# Patient Record
Sex: MALE | Race: WHITE | NOT HISPANIC OR LATINO | Employment: OTHER | URBAN - METROPOLITAN AREA
[De-identification: names, ages, dates, MRNs, and addresses within clinical notes are randomized per-mention and may not be internally consistent; named-entity substitution may affect disease eponyms.]

---

## 2017-02-15 ENCOUNTER — TELEPHONE (OUTPATIENT)
Dept: ENDOCRINOLOGY | Age: 77
End: 2017-02-15

## 2017-02-15 RX ORDER — LANCETS
EACH MISCELLANEOUS
Qty: 300 EACH | Refills: 1 | Status: SHIPPED | OUTPATIENT
Start: 2017-02-15 | End: 2017-08-07 | Stop reason: SDUPTHER

## 2017-02-15 NOTE — TELEPHONE ENCOUNTER
----- Message from Stella Oh sent at 2/15/2017 10:18 AM EST -----  Contact: patient  ACCU-CHEK SOFTCLIX LANCETS lancets 200 each         USE TO CHECK BLOOD SUGAR THREE TIMES DAILY    90 day supply         [facility]   [phone]

## 2017-02-22 DIAGNOSIS — R06.02 SOB (SHORTNESS OF BREATH): Primary | ICD-10-CM

## 2017-02-22 DIAGNOSIS — I25.10 CORONARY ARTERY DISEASE INVOLVING NATIVE CORONARY ARTERY OF NATIVE HEART WITHOUT ANGINA PECTORIS: ICD-10-CM

## 2017-02-22 DIAGNOSIS — I10 ESSENTIAL HYPERTENSION: ICD-10-CM

## 2017-03-05 ENCOUNTER — APPOINTMENT (OUTPATIENT)
Dept: CT IMAGING | Facility: HOSPITAL | Age: 77
End: 2017-03-05

## 2017-03-05 LAB — GLUCOSE BLDC GLUCOMTR-MCNC: 181 MG/DL (ref 70–130)

## 2017-03-05 PROCEDURE — 70450 CT HEAD/BRAIN W/O DYE: CPT

## 2017-03-05 PROCEDURE — 99283 EMERGENCY DEPT VISIT LOW MDM: CPT

## 2017-03-05 PROCEDURE — 82962 GLUCOSE BLOOD TEST: CPT

## 2017-03-05 PROCEDURE — 72125 CT NECK SPINE W/O DYE: CPT

## 2017-03-05 RX ORDER — ASPIRIN 81 MG/1
81 TABLET ORAL DAILY
COMMUNITY
End: 2018-01-31

## 2017-03-06 ENCOUNTER — HOSPITAL ENCOUNTER (EMERGENCY)
Facility: HOSPITAL | Age: 77
Discharge: HOME | End: 2017-03-06
Attending: EMERGENCY MEDICINE | Admitting: EMERGENCY MEDICINE

## 2017-03-06 VITALS
WEIGHT: 140 LBS | HEIGHT: 67 IN | RESPIRATION RATE: 12 BRPM | OXYGEN SATURATION: 98 % | SYSTOLIC BLOOD PRESSURE: 154 MMHG | DIASTOLIC BLOOD PRESSURE: 79 MMHG | BODY MASS INDEX: 21.97 KG/M2 | TEMPERATURE: 97.8 F | HEART RATE: 66 BPM
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
## 2017-03-06 DIAGNOSIS — S09.90XA CLOSED HEAD INJURY, INITIAL ENCOUNTER: Primary | ICD-10-CM

## 2017-03-06 DIAGNOSIS — E10.649 HYPOGLYCEMIC UNAWARENESS ASSOCIATED WITH TYPE 1 DIABETES MELLITUS: ICD-10-CM

## 2017-03-06 DIAGNOSIS — S16.1XXA CERVICAL STRAIN, INITIAL ENCOUNTER: ICD-10-CM

## 2017-03-06 LAB — GLUCOSE BLDC GLUCOMTR-MCNC: 228 MG/DL (ref 70–130)

## 2017-03-06 PROCEDURE — 82962 GLUCOSE BLOOD TEST: CPT

## 2017-03-06 NOTE — ED PROVIDER NOTES
76 y.o. male presents after a fall due to hypoglycemia. Pt was unable to remember the fall and was confused.     On exam:  Small hematoma to the right occipital scalp      I supervised care provided by the midlevel provider.  We have discussed this patient's history, physical exam, and treatment plan.  I have reviewed the note and personally saw and examined the patient and agree with the plan of care.    --  Documentation assistance provided by kellen Welsh.  Information recorded by the scribe was done at my direction and has been verified and validated by me.     Lynn Welsh  03/06/17 0127       Luis Denny MD  03/06/17 6345

## 2017-03-06 NOTE — DISCHARGE INSTRUCTIONS
Reduce the NPH from 10u to 8 u in the morning and evening.  Continue with your sliding scale with your fast acting insulin.  Follow up and see your doctor in next 48 hours for a recheck.  Take tylenol as needed for pain and headache.  Review the head injury instructions provided and return to the ER with any concerns.

## 2017-03-06 NOTE — ED NOTES
Pt states he does not remember fall, pt states he his neck is sore when he moves it. Denies headache     Elías Mcarthur RN  03/05/17 0537

## 2017-03-06 NOTE — ED NOTES
"\"sugar problem\" Pt wife reports he fell off the stairs in the garage and hit his head. Pt wife reports that he has been having problems with low sugar. Pt wife reports that the she gave him a fig and then checked his sugar and it was 68. Pt wife reports that pt keeps repeating same questions. Pt does not recall fall.      Ivonne Huang RN  03/05/17 2146    "

## 2017-03-06 NOTE — ED PROVIDER NOTES
EMERGENCY DEPARTMENT ENCOUNTER    CHIEF COMPLAINT  Chief Complaint: Fall  History given by: Patient, Family  History limited by:   Room Number: 06/06  PMD: Jeronimo Quevedo MD      HPI:  Pt is a 76 y.o. male who presents complaining of fall due to hypoglycemia today. Family reports that pt collapsed today and when his glucose was checked he was found to have a glucose of 65. Pt was unable to recall events of fall and was confused. Family gave pt several sugary foods and he became more alert and orientated. Pt struck his head during fall and reports that he is also having some neck pain. He recalls that he had been driving around earlier today and had eaten prior to fall. He has not had any weakness, numbness, CP, SOA, dizziness. Pt is on Plavix    Duration: PTA  Onset: sudden  Timing: constant  Location: head, neck  Radiation: none  Quality: sore  Intensity/Severity: moderate  Progression: unchanged  Associated Symptoms: confusion.  Aggravating Factors: none  Alleviating Factors: none  Previous Episodes: none  Treatment before arrival: given date by family which improved pt    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 11/14/2016   • Hypoglycemic unawareness associated with type 1 diabetes mellitus 11/14/2016     Resolved Ambulatory Problems     Diagnosis Date Noted   • No Resolved Ambulatory Problems     Past Medical History   Diagnosis Date   • BPH (benign prostatic hypertrophy)    • Hyperparathyroidism    • Hypothyroidism    • Type 2 diabetes mellitus        PAST SURGICAL  HISTORY  Past Surgical History   Procedure Laterality Date   • Cataract extraction Left    • Colonoscopy     • Hemorrhoidectomy     • Retinal laser procedure Right    • Turp / transurethral incision / drainage prostate     • Coronary angioplasty with stent placement     • Parathyroidectomy     • Penile prosthesis implant  2016     Dr. [facility] at [facility]       FAMILY HISTORY  Family History   Problem Relation Age of Onset   • Diabetes Mother    • Thyroid disease Mother    • Diabetes Father    • Kidney failure Father    • Heart disease Sister        SOCIAL HISTORY  Social History     Social History   • Marital status:      Spouse name: N/A   • Number of children: N/A   • Years of education: N/A     Occupational History   • Not on file.     Social History Main Topics   • Smoking status: Former Smoker     Types: Pipe     Quit date: 1986   • Smokeless tobacco: Not on file      Comment: SMOKED PIPE    • Alcohol use Yes      Comment: SOCIALLY    • Drug use: No   • Sexual activity: Not on file     Other Topics Concern   • Not on file     Social History Narrative       ALLERGIES  Review of patient's allergies indicates no known allergies.    REVIEW OF SYSTEMS  Review of Systems   Constitutional: Negative for activity change, appetite change and fever.   HENT: Negative for congestion and sore throat.    Eyes: Negative.    Respiratory: Negative for cough and shortness of breath.    Cardiovascular: Negative for chest pain and leg swelling.   Gastrointestinal: Negative for abdominal pain, diarrhea and vomiting.   Endocrine: Negative.    Genitourinary: Negative for decreased urine volume and dysuria.   Musculoskeletal: Positive for neck pain.   Skin: Negative for rash and wound.   Allergic/Immunologic: Negative.    Neurological: Negative for weakness, numbness and headaches.   Hematological: Negative.    Psychiatric/Behavioral: Positive for confusion.   All other systems reviewed and are negative.      PHYSICAL  EXAM  ED Triage Vitals   Temp Heart Rate Resp BP SpO2   03/05/17 2155 03/05/17 2155 03/05/17 2155 03/05/17 2155 03/05/17 2155   97.8 °F (36.6 °C) 74 18 132/59 99 %      Temp src Heart Rate Source Patient Position BP Location FiO2 (%)   03/05/17 2155 03/06/17 0024 -- -- --   Tympanic Monitor          Physical Exam   Constitutional: He is oriented to person, place, and time and well-developed, well-nourished, and in no distress.   HENT:   Head: Normocephalic and atraumatic.   Eyes: EOM are normal. Pupils are equal, round, and reactive to light.   Neck: Normal range of motion. Neck supple.   Cardiovascular: Normal rate, regular rhythm and normal heart sounds.    Pulmonary/Chest: Effort normal and breath sounds normal. No respiratory distress.   Abdominal: Soft. There is no tenderness. There is no rebound and no guarding.   Musculoskeletal: Normal range of motion. He exhibits no edema.   Neurological: He is alert and oriented to person, place, and time. He has normal sensation and normal strength.   Skin: Skin is warm and dry.   Psychiatric: Mood and affect normal.   Nursing note and vitals reviewed.      LAB RESULTS  Lab Results (last 24 hours)     Procedure Component Value Units Date/Time    POC Glucose Fingerstick [[id number]]  (Abnormal) Collected:  03/05/17 2154    Specimen:  Blood Updated:  03/05/17 2155     Glucose 181 (H) mg/dL     Narrative:       Meter: [id number] : [id number] Blue Mckinley    POC Glucose Fingerstick [[id number]]  (Abnormal) Collected:  03/06/17 0056    Specimen:  Blood Updated:  03/06/17 0058     Glucose 228 (H) mg/dL     Narrative:       Meter: [id number] : [id number] Erasmo Amaya I ordered the above labs and reviewed the results    RADIOLOGY  CT Head Without Contrast   Final Result   CRANIAL CT WITHOUT CONTRAST     HISTORY: Fell with trauma to back of head. Headache.     The CT scan was performed as an emergency procedure through the brain  without contrast. There is  prominent diffuse atrophy. The prominent  ventricular enlargement may simply relate to the diffuse atrophy, but  also raises the concern of a component of hydrocephalus and further  clinical correlation is recommended. This includes enlargement of the  4th ventricle. There is no evidence of acute intracranial hemorrhage or  mass effect.     There is a tiny amount of mucosal thickening in the sphenoid sinus and  scattered in the ethmoid air cells as well as a tiny amount of fluid in  the left maxillary sinus.      CT Cervical Spine Without Contrast   Final Result   CT SCAN OF THE CERVICAL SPINE     HISTORY: Fell. Neck pain.     The CT scan was performed as an emergency procedure and demonstrates the  followin. There is no evidence of acute fracture with particular reference to  the odontoid.  2. There are scattered degenerative changes throughout the cervical  spine, but there is no evidence of central or foraminal encroachment.     This report was finalized on 3/5/2017 11:44 PM by Dr. Ahmet Valderrama MD.           I ordered the above noted radiological studies. Interpreted by radiologist. Reviewed by me in PACS.       PROCEDURES  Procedures      PROGRESS AND CONSULTS  ED Course   12:52 AM  Informed pt and family of labs and imaging showing nothing remarkable. Discussed with pt about possibility of decreasing his long acting insulin and plan to discharge. Pt understands and agrees with plan. All concerns addressed.  12:54 AM  Dicussed pt with Dr. Denny. Dr. Denny has seen and evaluated pt and agrees with tx plan.         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    Reviewed implications of results, diagnosis, meds, responsibility to follow up, warning signs and symptoms of possible worsening, potential complications and reasons to return to ER.    Patient/Family voiced understanding of above instructions.    Discussed plan for discharge, as there is no emergent indication for admission.  Pt/family is agreeable and understands need for follow up and repeat testing.  Pt is aware that discharge does not mean that nothing is wrong but it indicates no emergency is present that requires admission and they must continue care with follow-up as given below or physician of their choice.     FOLLOW-UP  Jeronimo Quevedo MD  [address] [place] [id number] [phone]    Call in 2 days  If symptoms worsen, For Primary Physician follow-up         Medication List      Changed          insulin  UNIT/ML injection   Commonly known as:  humuLIN N,novoLIN N   10 units every morning and 10 units at bedtime   What changed:  additional instructions               Latest Documented Vital Signs:  As of 4:20 AM  BP- 154/79 HR- 66 Temp- 97.8 °F (36.6 °C) (Tympanic) O2 sat- 98%    --  Documentation assistance provided by kellen Gay for Chuy Álvarez PA-C.  Information recorded by the scribe was done at my direction and has been verified and validated by me.       Joey Gay  03/06/17 0056       TRINITY Saul III  03/06/17 7640

## 2017-03-13 RX ORDER — LEVOTHYROXINE SODIUM 0.12 MG/1
TABLET ORAL
Qty: 90 TABLET | Refills: 1 | Status: SHIPPED | OUTPATIENT
Start: 2017-03-13 | End: 2017-05-17 | Stop reason: SDUPTHER

## 2017-03-14 DIAGNOSIS — R80.9 TYPE 1 DIABETES MELLITUS WITH PERSISTENT MICROALBUMINURIA (HCC): ICD-10-CM

## 2017-03-14 DIAGNOSIS — E10.29 TYPE 1 DIABETES MELLITUS WITH PERSISTENT MICROALBUMINURIA (HCC): ICD-10-CM

## 2017-03-14 DIAGNOSIS — N52.9 ERECTILE DYSFUNCTION, UNSPECIFIED ERECTILE DYSFUNCTION TYPE: ICD-10-CM

## 2017-03-14 DIAGNOSIS — I25.10 CORONARY ARTERY DISEASE INVOLVING NATIVE CORONARY ARTERY OF NATIVE HEART WITHOUT ANGINA PECTORIS: ICD-10-CM

## 2017-03-14 DIAGNOSIS — E03.9 HYPOTHYROIDISM (ACQUIRED): ICD-10-CM

## 2017-03-14 RX ORDER — ATORVASTATIN CALCIUM 20 MG/1
20 TABLET, FILM COATED ORAL DAILY
Qty: 90 TABLET | Refills: 1 | Status: SHIPPED | OUTPATIENT
Start: 2017-03-14 | End: 2021-04-07 | Stop reason: SDUPTHER

## 2017-03-14 RX ORDER — ISOSORBIDE MONONITRATE 30 MG/1
30 TABLET, EXTENDED RELEASE ORAL DAILY
Qty: 90 TABLET | Refills: 1 | Status: SHIPPED | OUTPATIENT
Start: 2017-03-14 | End: 2017-03-17 | Stop reason: SDUPTHER

## 2017-03-17 RX ORDER — ISOSORBIDE MONONITRATE 30 MG/1
30 TABLET, EXTENDED RELEASE ORAL DAILY
Qty: 90 TABLET | Refills: 1 | Status: SHIPPED | OUTPATIENT
Start: 2017-03-17 | End: 2017-03-28 | Stop reason: SDUPTHER

## 2017-03-17 RX ORDER — CLOPIDOGREL BISULFATE 75 MG/1
75 TABLET ORAL DAILY
Qty: 90 TABLET | Refills: 1 | Status: SHIPPED | OUTPATIENT
Start: 2017-03-17 | End: 2017-12-07 | Stop reason: SDUPTHER

## 2017-03-28 RX ORDER — ISOSORBIDE MONONITRATE 30 MG/1
30 TABLET, EXTENDED RELEASE ORAL DAILY
Qty: 90 TABLET | Refills: 1 | Status: SHIPPED | OUTPATIENT
Start: 2017-03-28 | End: 2017-05-15

## 2017-03-28 RX ORDER — CLOPIDOGREL BISULFATE 75 MG/1
75 TABLET ORAL DAILY
Qty: 90 TABLET | Refills: 1 | Status: SHIPPED | OUTPATIENT
Start: 2017-03-28 | End: 2017-05-15

## 2017-04-11 DIAGNOSIS — I10 ESSENTIAL HYPERTENSION: ICD-10-CM

## 2017-04-11 DIAGNOSIS — I25.10 CORONARY ARTERY DISEASE INVOLVING NATIVE CORONARY ARTERY OF NATIVE HEART WITHOUT ANGINA PECTORIS: ICD-10-CM

## 2017-04-11 DIAGNOSIS — R06.02 SOB (SHORTNESS OF BREATH): Primary | ICD-10-CM

## 2017-05-15 ENCOUNTER — OFFICE VISIT (OUTPATIENT)
Dept: ENDOCRINOLOGY | Age: 77
End: 2017-05-15

## 2017-05-15 VITALS
BODY MASS INDEX: 21.44 KG/M2 | SYSTOLIC BLOOD PRESSURE: 114 MMHG | DIASTOLIC BLOOD PRESSURE: 58 MMHG | HEIGHT: 67 IN | WEIGHT: 136.6 LBS | HEART RATE: 74 BPM | OXYGEN SATURATION: 96 %

## 2017-05-15 DIAGNOSIS — I25.10 CORONARY ARTERY DISEASE INVOLVING NATIVE CORONARY ARTERY OF NATIVE HEART WITHOUT ANGINA PECTORIS: ICD-10-CM

## 2017-05-15 DIAGNOSIS — N52.9 ERECTILE DYSFUNCTION, UNSPECIFIED ERECTILE DYSFUNCTION TYPE: ICD-10-CM

## 2017-05-15 DIAGNOSIS — E78.5 HYPERLIPIDEMIA, UNSPECIFIED HYPERLIPIDEMIA TYPE: ICD-10-CM

## 2017-05-15 DIAGNOSIS — E10.649 HYPOGLYCEMIC UNAWARENESS ASSOCIATED WITH TYPE 1 DIABETES MELLITUS: ICD-10-CM

## 2017-05-15 DIAGNOSIS — E10.29 TYPE 1 DIABETES MELLITUS WITH PERSISTENT MICROALBUMINURIA (HCC): ICD-10-CM

## 2017-05-15 DIAGNOSIS — R80.9 TYPE 1 DIABETES MELLITUS WITH PERSISTENT MICROALBUMINURIA (HCC): ICD-10-CM

## 2017-05-15 DIAGNOSIS — E89.0 POSTOPERATIVE HYPOTHYROIDISM: ICD-10-CM

## 2017-05-15 DIAGNOSIS — E10.43 TYPE 1 DIABETES MELLITUS WITH AUTONOMIC NEUROPATHY (HCC): Primary | ICD-10-CM

## 2017-05-15 DIAGNOSIS — I10 ESSENTIAL HYPERTENSION: ICD-10-CM

## 2017-05-15 PROCEDURE — 99214 OFFICE O/P EST MOD 30 MIN: CPT | Performed by: INTERNAL MEDICINE

## 2017-05-15 RX ORDER — BLOOD SUGAR DIAGNOSTIC
STRIP MISCELLANEOUS
Qty: 500 EACH | Refills: 2 | Status: SHIPPED | OUTPATIENT
Start: 2017-05-15 | End: 2019-03-13 | Stop reason: SDUPTHER

## 2017-05-15 RX ORDER — TAMSULOSIN HYDROCHLORIDE 0.4 MG/1
2 CAPSULE ORAL DAILY
COMMUNITY
Start: 2017-03-16

## 2017-05-15 RX ORDER — NITROGLYCERIN 0.4 MG/1
0.4 TABLET SUBLINGUAL
COMMUNITY
Start: 2013-12-03 | End: 2018-09-06 | Stop reason: ALTCHOICE

## 2017-05-15 RX ORDER — BLOOD SUGAR DIAGNOSTIC
STRIP MISCELLANEOUS
Qty: 500 EACH | Refills: 2 | Status: SHIPPED | OUTPATIENT
Start: 2017-05-15 | End: 2017-05-15 | Stop reason: SDUPTHER

## 2017-05-16 LAB
ALBUMIN SERPL-MCNC: 3.9 G/DL (ref 3.5–5.2)
ALBUMIN/GLOB SERPL: 1.4 G/DL
ALP SERPL-CCNC: 183 U/L (ref 39–117)
ALT SERPL-CCNC: 22 U/L (ref 1–41)
AST SERPL-CCNC: 19 U/L (ref 1–40)
BILIRUB SERPL-MCNC: 0.4 MG/DL (ref 0.1–1.2)
BUN SERPL-MCNC: 25 MG/DL (ref 8–23)
BUN/CREAT SERPL: 23.4 (ref 7–25)
CALCIUM SERPL-MCNC: 10.4 MG/DL (ref 8.6–10.5)
CHLORIDE SERPL-SCNC: 102 MMOL/L (ref 98–107)
CHOLEST SERPL-MCNC: 122 MG/DL (ref 0–200)
CO2 SERPL-SCNC: 30.5 MMOL/L (ref 22–29)
CREAT SERPL-MCNC: 1.07 MG/DL (ref 0.76–1.27)
GLOBULIN SER CALC-MCNC: 2.7 GM/DL
GLUCOSE SERPL-MCNC: 162 MG/DL (ref 65–99)
HBA1C MFR BLD: 6.2 % (ref 4.8–5.6)
HDLC SERPL-MCNC: 59 MG/DL (ref 40–60)
LDLC SERPL CALC-MCNC: 43 MG/DL (ref 0–100)
POTASSIUM SERPL-SCNC: 4.7 MMOL/L (ref 3.5–5.2)
PROT SERPL-MCNC: 6.6 G/DL (ref 6–8.5)
SODIUM SERPL-SCNC: 142 MMOL/L (ref 136–145)
T4 FREE SERPL-MCNC: 1.99 NG/DL (ref 0.93–1.7)
TRIGL SERPL-MCNC: 101 MG/DL (ref 0–150)
TSH SERPL DL<=0.005 MIU/L-ACNC: 0.05 MIU/ML (ref 0.27–4.2)
VLDLC SERPL CALC-MCNC: 20.2 MG/DL (ref 5–40)

## 2017-05-17 ENCOUNTER — TELEPHONE (OUTPATIENT)
Dept: ENDOCRINOLOGY | Age: 77
End: 2017-05-17

## 2017-05-17 RX ORDER — LEVOTHYROXINE SODIUM 0.1 MG/1
TABLET ORAL
Qty: 90 TABLET | Refills: 1 | Status: SHIPPED | OUTPATIENT
Start: 2017-05-17 | End: 2017-07-10 | Stop reason: SDUPTHER

## 2017-06-23 DIAGNOSIS — E03.9 HYPOTHYROIDISM (ACQUIRED): ICD-10-CM

## 2017-06-23 DIAGNOSIS — E78.5 HYPERLIPIDEMIA: ICD-10-CM

## 2017-06-23 DIAGNOSIS — I25.10 CORONARY ARTERY DISEASE INVOLVING NATIVE CORONARY ARTERY OF NATIVE HEART WITHOUT ANGINA PECTORIS: ICD-10-CM

## 2017-06-23 DIAGNOSIS — N52.9 ERECTILE DYSFUNCTION, UNSPECIFIED ERECTILE DYSFUNCTION TYPE: ICD-10-CM

## 2017-06-23 DIAGNOSIS — E10.29 TYPE 1 DIABETES MELLITUS WITH PERSISTENT MICROALBUMINURIA (HCC): ICD-10-CM

## 2017-06-23 DIAGNOSIS — R80.9 TYPE 1 DIABETES MELLITUS WITH PERSISTENT MICROALBUMINURIA (HCC): ICD-10-CM

## 2017-06-23 RX ORDER — HUMAN INSULIN 100 [IU]/ML
INJECTION, SUSPENSION SUBCUTANEOUS
Qty: 10 ML | Refills: 2 | Status: SHIPPED | OUTPATIENT
Start: 2017-06-23 | End: 2017-10-30 | Stop reason: SDUPTHER

## 2017-06-23 RX ORDER — HUMAN INSULIN 100 [IU]/ML
INJECTION, SOLUTION SUBCUTANEOUS
Qty: 10 ML | Refills: 2 | Status: SHIPPED | OUTPATIENT
Start: 2017-06-23 | End: 2017-10-30 | Stop reason: SDUPTHER

## 2017-06-26 ENCOUNTER — TELEPHONE (OUTPATIENT)
Dept: ENDOCRINOLOGY | Age: 77
End: 2017-06-26

## 2017-06-26 NOTE — TELEPHONE ENCOUNTER
----- Message from Laureen Fierro sent at 6/21/2017 11:07 AM EDT -----  Contact: PATIENT'S WIFE  THE PATIENT IS GOING OUT OF TOWN ON 7/15/17 AND NEEDS A NOTE STATING THAT HE IS ON INSULIN IN ORDER TO BOARD THE PLANE. HE IS ALSO NEEDING TO KNOW WHAT HE COULD GET THAT WOULD KEEP HIS INSULIN COOL. HE DOESN'T KNOW IF WHERE THEY WILL BE STAYING WILL HAVE ENOUGH COLD STORAGE FOR THE INSULIN. PLEASE CALL THE PATIENT TO ADVISE.

## 2017-07-03 ENCOUNTER — TELEPHONE (OUTPATIENT)
Dept: CARDIOLOGY | Facility: CLINIC | Age: 77
End: 2017-07-03

## 2017-07-03 NOTE — TELEPHONE ENCOUNTER
----- Message from Stephany Taylor RN sent at 6/30/2017  4:16 PM EDT -----  Regarding: FW: surg clearance  Contact: [phone]      ----- Message -----     From: Fransisca Nieto     Sent: 6/29/2017  10:03 AM       To: [facility]  Subject: surg clearance                                   Ext [id number]    Dr bustamante- pt needs to come off his plavix for about 4 days to have a colonoscopy, needs clearance for this        This is Ok, as with stopping any medication, there is a slight cardiac risk per Dr. Kong.

## 2017-07-10 RX ORDER — LEVOTHYROXINE SODIUM 0.1 MG/1
TABLET ORAL
Qty: 90 TABLET | Refills: 1 | Status: SHIPPED | OUTPATIENT
Start: 2017-07-10 | End: 2017-09-22 | Stop reason: SDUPTHER

## 2017-08-02 ENCOUNTER — TELEPHONE (OUTPATIENT)
Dept: CARDIOLOGY | Facility: CLINIC | Age: 77
End: 2017-08-02

## 2017-08-02 NOTE — TELEPHONE ENCOUNTER
Patient is scheduled for a colonoscopy on 8/16/17 with Dr. Benoit.      They are wanting clearance for the patient to stop his Plavix and ASA five days prior to the procedure.    Dr. Benoit's office should be sending over the official request as well.

## 2017-08-07 RX ORDER — LANCETS
EACH MISCELLANEOUS
Qty: 300 EACH | Refills: 1 | Status: SHIPPED | OUTPATIENT
Start: 2017-08-07 | End: 2018-03-02 | Stop reason: SDUPTHER

## 2017-09-07 ENCOUNTER — HOSPITAL ENCOUNTER (OUTPATIENT)
Dept: CARDIOLOGY | Facility: HOSPITAL | Age: 77
Discharge: HOME | End: 2017-09-07
Attending: INTERNAL MEDICINE | Admitting: INTERNAL MEDICINE

## 2017-09-07 ENCOUNTER — OFFICE VISIT (OUTPATIENT)
Dept: CARDIOLOGY | Facility: CLINIC | Age: 77
End: 2017-09-07

## 2017-09-07 VITALS
DIASTOLIC BLOOD PRESSURE: 59 MMHG | HEIGHT: 67 IN | BODY MASS INDEX: 20.88 KG/M2 | WEIGHT: 133 LBS | SYSTOLIC BLOOD PRESSURE: 120 MMHG | HEART RATE: 60 BPM

## 2017-09-07 VITALS
DIASTOLIC BLOOD PRESSURE: 63 MMHG | HEIGHT: 67 IN | WEIGHT: 137 LBS | SYSTOLIC BLOOD PRESSURE: 128 MMHG | BODY MASS INDEX: 21.5 KG/M2 | HEART RATE: 65 BPM

## 2017-09-07 DIAGNOSIS — R06.02 SOB (SHORTNESS OF BREATH): ICD-10-CM

## 2017-09-07 DIAGNOSIS — E78.5 HYPERLIPIDEMIA, UNSPECIFIED HYPERLIPIDEMIA TYPE: ICD-10-CM

## 2017-09-07 DIAGNOSIS — I25.10 CORONARY ARTERY DISEASE INVOLVING NATIVE CORONARY ARTERY OF NATIVE HEART WITHOUT ANGINA PECTORIS: ICD-10-CM

## 2017-09-07 DIAGNOSIS — I10 ESSENTIAL HYPERTENSION: Primary | ICD-10-CM

## 2017-09-07 DIAGNOSIS — I10 ESSENTIAL HYPERTENSION: ICD-10-CM

## 2017-09-07 LAB
BH CV ECHO MEAS - ACS: 1.6 CM
BH CV ECHO MEAS - AO MAX PG (FULL): 7.4 MMHG
BH CV ECHO MEAS - AO MAX PG: 15.2 MMHG
BH CV ECHO MEAS - AO MEAN PG (FULL): 4 MMHG
BH CV ECHO MEAS - AO MEAN PG: 7 MMHG
BH CV ECHO MEAS - AO ROOT AREA (BSA CORRECTED): 1.6
BH CV ECHO MEAS - AO ROOT AREA: 5.7 CM^2
BH CV ECHO MEAS - AO ROOT DIAM: 2.7 CM
BH CV ECHO MEAS - AO V2 MAX: 195 CM/SEC
BH CV ECHO MEAS - AO V2 MEAN: 124 CM/SEC
BH CV ECHO MEAS - AO V2 VTI: 39.8 CM
BH CV ECHO MEAS - ASC AORTA: 2.9 CM
BH CV ECHO MEAS - AVA(I,A): 2 CM^2
BH CV ECHO MEAS - AVA(I,D): 2 CM^2
BH CV ECHO MEAS - AVA(V,A): 2 CM^2
BH CV ECHO MEAS - AVA(V,D): 2 CM^2
BH CV ECHO MEAS - BSA(HAYCOCK): 1.7 M^2
BH CV ECHO MEAS - BSA: 1.7 M^2
BH CV ECHO MEAS - BZI_BMI: 21.5 KILOGRAMS/M^2
BH CV ECHO MEAS - BZI_METRIC_HEIGHT: 170.2 CM
BH CV ECHO MEAS - BZI_METRIC_WEIGHT: 62.1 KG
BH CV ECHO MEAS - CONTRAST EF (2CH): 61.8 ML/M^2
BH CV ECHO MEAS - CONTRAST EF 4CH: 57.4 ML/M^2
BH CV ECHO MEAS - EDV(CUBED): 103.8 ML
BH CV ECHO MEAS - EDV(MOD-SP2): 55 ML
BH CV ECHO MEAS - EDV(MOD-SP4): 61 ML
BH CV ECHO MEAS - EDV(TEICH): 102.4 ML
BH CV ECHO MEAS - EF(CUBED): 68.4 %
BH CV ECHO MEAS - EF(MOD-SP2): 61.8 %
BH CV ECHO MEAS - EF(MOD-SP4): 57.4 %
BH CV ECHO MEAS - EF(TEICH): 60 %
BH CV ECHO MEAS - ESV(CUBED): 32.8 ML
BH CV ECHO MEAS - ESV(MOD-SP2): 21 ML
BH CV ECHO MEAS - ESV(MOD-SP4): 26 ML
BH CV ECHO MEAS - ESV(TEICH): 41 ML
BH CV ECHO MEAS - FS: 31.9 %
BH CV ECHO MEAS - IVS/LVPW: 0.75
BH CV ECHO MEAS - IVSD: 0.6 CM
BH CV ECHO MEAS - LV DIASTOLIC VOL/BSA (35-75): 35.4 ML/M^2
BH CV ECHO MEAS - LV MASS(C)D: 103.1 GRAMS
BH CV ECHO MEAS - LV MASS(C)DI: 59.9 GRAMS/M^2
BH CV ECHO MEAS - LV MAX PG: 7.8 MMHG
BH CV ECHO MEAS - LV MEAN PG: 3 MMHG
BH CV ECHO MEAS - LV SYSTOLIC VOL/BSA (12-30): 15.1 ML/M^2
BH CV ECHO MEAS - LV V1 MAX: 140 CM/SEC
BH CV ECHO MEAS - LV V1 MEAN: 72.1 CM/SEC
BH CV ECHO MEAS - LV V1 VTI: 27.4 CM
BH CV ECHO MEAS - LVIDD: 4.7 CM
BH CV ECHO MEAS - LVIDS: 3.2 CM
BH CV ECHO MEAS - LVLD AP2: 6.7 CM
BH CV ECHO MEAS - LVLD AP4: 6.6 CM
BH CV ECHO MEAS - LVLS AP2: 5.6 CM
BH CV ECHO MEAS - LVLS AP4: 6.5 CM
BH CV ECHO MEAS - LVOT AREA (M): 2.8 CM^2
BH CV ECHO MEAS - LVOT AREA: 2.8 CM^2
BH CV ECHO MEAS - LVOT DIAM: 1.9 CM
BH CV ECHO MEAS - LVPWD: 0.8 CM
BH CV ECHO MEAS - MR MAX PG: 64 MMHG
BH CV ECHO MEAS - MR MAX VEL: 400 CM/SEC
BH CV ECHO MEAS - MV A DUR: 0.11 SEC
BH CV ECHO MEAS - MV A MAX VEL: 102 CM/SEC
BH CV ECHO MEAS - MV DEC SLOPE: 444 CM/SEC^2
BH CV ECHO MEAS - MV DEC TIME: 0.19 SEC
BH CV ECHO MEAS - MV E MAX VEL: 73.3 CM/SEC
BH CV ECHO MEAS - MV E/A: 0.72
BH CV ECHO MEAS - MV MAX PG: 4.7 MMHG
BH CV ECHO MEAS - MV MEAN PG: 2 MMHG
BH CV ECHO MEAS - MV P1/2T MAX VEL: 99.1 CM/SEC
BH CV ECHO MEAS - MV P1/2T: 65.4 MSEC
BH CV ECHO MEAS - MV V2 MAX: 108 CM/SEC
BH CV ECHO MEAS - MV V2 MEAN: 56.1 CM/SEC
BH CV ECHO MEAS - MV V2 VTI: 36.2 CM
BH CV ECHO MEAS - MVA P1/2T LCG: 2.2 CM^2
BH CV ECHO MEAS - MVA(P1/2T): 3.4 CM^2
BH CV ECHO MEAS - MVA(VTI): 2.1 CM^2
BH CV ECHO MEAS - PA ACC TIME: 0.1 SEC
BH CV ECHO MEAS - PA MAX PG: 6.2 MMHG
BH CV ECHO MEAS - PA PR(ACCEL): 34 MMHG
BH CV ECHO MEAS - PA V2 MAX: 124 CM/SEC
BH CV ECHO MEAS - PI END-D VEL: 118 CM/SEC
BH CV ECHO MEAS - PULM A REVS DUR: 0.1 SEC
BH CV ECHO MEAS - PULM A REVS VEL: 42.1 CM/SEC
BH CV ECHO MEAS - PULM DIAS VEL: 61.8 CM/SEC
BH CV ECHO MEAS - PULM S/D: 1.7
BH CV ECHO MEAS - PULM SYS VEL: 108 CM/SEC
BH CV ECHO MEAS - SI(AO): 132.4 ML/M^2
BH CV ECHO MEAS - SI(CUBED): 41.3 ML/M^2
BH CV ECHO MEAS - SI(LVOT): 45.1 ML/M^2
BH CV ECHO MEAS - SI(MOD-SP2): 19.7 ML/M^2
BH CV ECHO MEAS - SI(MOD-SP4): 20.3 ML/M^2
BH CV ECHO MEAS - SI(TEICH): 35.7 ML/M^2
BH CV ECHO MEAS - SV(AO): 227.9 ML
BH CV ECHO MEAS - SV(CUBED): 71.1 ML
BH CV ECHO MEAS - SV(LVOT): 77.7 ML
BH CV ECHO MEAS - SV(MOD-SP2): 34 ML
BH CV ECHO MEAS - SV(MOD-SP4): 35 ML
BH CV ECHO MEAS - SV(TEICH): 61.4 ML
BH CV ECHO MEAS - TR MAX VEL: 258 CM/SEC
BH CV VAS BP LEFT ARM: NORMAL MMHG

## 2017-09-07 PROCEDURE — 0399T HC MYOCARDL STRAIN IMAG QUAN ASSMT PER SESS: CPT

## 2017-09-07 PROCEDURE — 93306 TTE W/DOPPLER COMPLETE: CPT

## 2017-09-07 PROCEDURE — 93306 TTE W/DOPPLER COMPLETE: CPT | Performed by: INTERNAL MEDICINE

## 2017-09-07 PROCEDURE — 99213 OFFICE O/P EST LOW 20 MIN: CPT | Performed by: INTERNAL MEDICINE

## 2017-09-07 PROCEDURE — 0399T ADULT TRANSTHORACIC ECHO COMPLETE: CPT | Performed by: INTERNAL MEDICINE

## 2017-09-07 PROCEDURE — 93000 ELECTROCARDIOGRAM COMPLETE: CPT | Performed by: INTERNAL MEDICINE

## 2017-09-07 RX ORDER — PREDNISONE 10 MG/1
20 TABLET ORAL DAILY
COMMUNITY
Start: 2017-08-24 | End: 2018-01-31

## 2017-09-07 NOTE — PROGRESS NOTES
" Subjective:       Griffin Angeles is a 76 y.o. male who here for follow up    CC  Follow-up for the coronary artery disease hypertension and hyperlipidemia  HPI  76-year-old white male with known history of coronary artery disease, hyperlipidemia as was a benign essential arterial hypertension is here for the follow-up, recently underwent a colonoscopy recently followed by heavy dose of steroids has been complaining of the shortness of breath as well as a pressure extremely high     Problem List Items Addressed This Visit        Cardiovascular and Mediastinum    CAD (coronary artery disease)    Relevant Orders    ECG 12 Lead    Hyperlipidemia    Essential hypertension - Primary        .    The following portions of the patient's history were reviewed and updated as appropriate: allergies, current medications, past family history, past medical history, past social history, past surgical history and problem list.    Past Medical History:   Diagnosis Date   • BPH (benign prostatic hypertrophy)    • CAD (coronary artery disease)    • Hyperlipidemia    • Hyperparathyroidism    • Hypothyroidism    • Postherpetic neuralgia    • Type 2 diabetes mellitus     reports that he quit smoking about 31 years ago. His smoking use included Pipe. He does not have any smokeless tobacco history on file. He reports that he drinks alcohol. He reports that he does not use illicit drugs.  Family History   Problem Relation Age of Onset   • Diabetes Mother    • Thyroid disease Mother    • Diabetes Father    • Kidney failure Father    • Heart disease Sister        Review of Systems  Constitutional: No wt loss, fever, fatigue  Gastrointestinal: No nausea, abdominal pain  Behavioral/Psych: No insomnia or anxiety   Cardiovascular Shortness of breath  Objective:       Physical Exam             Physical Exam  /59  Pulse 60  Ht 67\" (170.2 cm)  Wt 133 lb (60.3 kg)  BMI 20.83 kg/m2    General appearance: NAD, conversant   Eyes: anicteric " "sclerae, moist conjunctivae; no lid-lag; PERRLA   HENT: Atraumatic; oropharynx clear with moist mucous membranes and no mucosal ulcerations;  normal hard and soft palate   Neck: Trachea midline; FROM, supple, no thyromegaly or lymphadenopathy   Lungs: CTA, with normal respiratory effort and no intercostal retractions   CV: S1-S2 regular, no murmurs, no rub, no gallop   Abdomen: Soft, non-tender; no masses or HSM   Extremities: No peripheral edema or extremity lymphadenopathy  Skin: Normal temperature, turgor and texture; no rash, ulcers or subcutaneous nodules   Psych: Appropriate affect, alert and oriented to person, place and time           Cardiographics  @  ECG 12 Lead  Date/Time: 9/7/2017 10:48 AM  Performed by: ORLANDO MATOS  Authorized by: ORLANDO MATOS   Comparison: compared with previous ECG   Similar to previous ECG  Rhythm: sinus rhythm  Clinical impression: normal ECG            Echocardiogram:        Current Outpatient Prescriptions:   •  ACCU-CHEK SOFTCLIX LANCETS lancets, Testing bs 3 x day dx code E10.43, Disp: 300 each, Rfl: 1  •  aspirin 81 MG EC tablet, Take 81 mg by mouth Daily. 1 tablet every couple of day s, Disp: , Rfl:   •  atorvastatin (LIPITOR) 20 MG tablet, Take 1 tablet by mouth Daily., Disp: 90 tablet, Rfl: 1  •  clopidogrel (PLAVIX) 75 MG tablet, Take 1 tablet by mouth Daily., Disp: 90 tablet, Rfl: 1  •  gabapentin (NEURONTIN) 300 MG capsule, Take 300 mg by mouth 3 (Three) Times a Day., Disp: , Rfl:   •  glucagon (GLUCAGON EMERGENCY) 1 MG injection, Inject 1 mg under the skin 1 (one) time as needed for low blood sugar., Disp: , Rfl:   •  glucose blood (FREESTYLE LITE) test strip, Testing bs 3 x day  Dx Code E10.43, Disp: 300 each, Rfl: 1  •  Insulin Syringe-Needle U-100 31G X 5/16\" 0.5 ML misc, Use 5 times a day, Disp: 500 each, Rfl: 2  •  isosorbide mononitrate (IMDUR) 60 MG 24 hr tablet, Take 60 mg by mouth., Disp: , Rfl:   •  levothyroxine (SYNTHROID, LEVOTHROID) 100 " MCG tablet, Take 1 tablet in the morning on an empty stomach 1 hr before eating, Disp: 90 tablet, Rfl: 1  •  metoprolol succinate XL (TOPROL-XL) 50 MG 24 hr tablet, Take 50 mg by mouth daily.  , Disp: , Rfl:   •  Multiple Vitamin (MULTIVITAMIN) tablet, Take 1 tablet by mouth., Disp: , Rfl:   •  NOVOLIN R RELION 100 UNIT/ML injection, INJECT 10 UNITS BEFORE BREAKFAST AND 10 UNITS BEFORE SUPPER, Disp: 10 mL, Rfl: 2  •  predniSONE (DELTASONE) 10 MG tablet, , Disp: , Rfl:   •  tamsulosin (FLOMAX) 0.4 MG capsule 24 hr capsule, 1 capsule 2 (Two) Times a Day., Disp: , Rfl:   •  valsartan-hydrochlorothiazide (DIOVAN-HCT) 160-12.5 MG per tablet, Take 1 tablet by mouth Daily., Disp: , Rfl:   •  niacin 500 MG tablet, Take 500 mg by mouth As Needed. In winter time, Disp: , Rfl:   •  nitroglycerin (NITROSTAT) 0.4 MG SL tablet, Place 0.4 mg under the tongue., Disp: , Rfl:   •  NOVOLIN N RELION 100 UNIT/ML injection, INJECT 10 UNITS SUBCUTANEOUSLY IN THE MORNING AND 10 AT BEDTIME, Disp: 10 mL, Rfl: 2   Assessment:        Patient Active Problem List   Diagnosis   • CAD (coronary artery disease)   • Hyperlipidemia   • Postoperative hypothyroidism   • Abnormal cardiovascular stress test   • Erectile dysfunction   • Benign prostatic hyperplasia with urinary obstruction   • Essential hypertension   • Laryngitis   • Postherpetic neuralgia   • Type 1 diabetes mellitus with autonomic neuropathy   • 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 Essential hypertension I10 401.9   2. Coronary artery disease involving native coronary artery of native heart without angina pectoris I25.10 414.01   3. Hyperlipidemia, unspecified hyperlipidemia type E78.5 272.4     1. Essential hypertension  Blood pressures are well controlled    2. Coronary artery disease involving native coronary artery of native heart without  angina pectoris  Griffin Angeles with coronary artery disease has no angina pectoris    Risk reduction for the coronary artery disease, controlling the blood pressure, blood sugar management, cholesterol management, exercise, stress management, and proper compliance with medications and follow-up has been discussed    - ECG 12 Lead    3. Hyperlipidemia, unspecified hyperlipidemia type  Risk of the hyperlipidemia, importance of the treatment has been explained    Pros and cons of the statins has been explained    Regular blood workup as well as side effects including the liver failure, myelopathy death has been explained           CV STABLE    SEE US 1 YR  COUNSELING:    Griffin AngelesMeaghan was given to patient for the following topics: diagnostic results, risk factor reductions, impressions, risks and benefits of treatment options and importance of treatment compliance .       SMOKING COUNSELING:    Counseling given: Not Answered      EMR Dragon/Transcription disclaimer:   Much of this encounter note is an electronic transcription/translation of spoken language to printed text. The electronic translation of spoken language may permit erroneous, or at times, nonsensical words or phrases to be inadvertently transcribed; Although I have reviewed the note for such errors, some may still exist.

## 2017-09-21 ENCOUNTER — OFFICE VISIT (OUTPATIENT)
Dept: ENDOCRINOLOGY | Age: 77
End: 2017-09-21

## 2017-09-21 VITALS
DIASTOLIC BLOOD PRESSURE: 62 MMHG | OXYGEN SATURATION: 98 % | BODY MASS INDEX: 20.62 KG/M2 | SYSTOLIC BLOOD PRESSURE: 124 MMHG | HEART RATE: 59 BPM | WEIGHT: 131.4 LBS | HEIGHT: 67 IN

## 2017-09-21 DIAGNOSIS — I10 ESSENTIAL HYPERTENSION: ICD-10-CM

## 2017-09-21 DIAGNOSIS — R80.9 TYPE 1 DIABETES MELLITUS WITH PERSISTENT MICROALBUMINURIA (HCC): ICD-10-CM

## 2017-09-21 DIAGNOSIS — E78.5 HYPERLIPIDEMIA, UNSPECIFIED HYPERLIPIDEMIA TYPE: ICD-10-CM

## 2017-09-21 DIAGNOSIS — E10.43 TYPE 1 DIABETES MELLITUS WITH AUTONOMIC NEUROPATHY (HCC): Primary | ICD-10-CM

## 2017-09-21 DIAGNOSIS — E10.649 HYPOGLYCEMIC UNAWARENESS ASSOCIATED WITH TYPE 1 DIABETES MELLITUS: ICD-10-CM

## 2017-09-21 DIAGNOSIS — K52.9 CHRONIC NONSPECIFIC COLITIS: ICD-10-CM

## 2017-09-21 DIAGNOSIS — E10.29 TYPE 1 DIABETES MELLITUS WITH PERSISTENT MICROALBUMINURIA (HCC): ICD-10-CM

## 2017-09-21 DIAGNOSIS — I25.10 CORONARY ARTERY DISEASE INVOLVING NATIVE CORONARY ARTERY OF NATIVE HEART WITHOUT ANGINA PECTORIS: ICD-10-CM

## 2017-09-21 PROCEDURE — 99214 OFFICE O/P EST MOD 30 MIN: CPT | Performed by: INTERNAL MEDICINE

## 2017-09-21 RX ORDER — LEVOTHYROXINE SODIUM 0.12 MG/1
TABLET ORAL
COMMUNITY
Start: 2017-09-17 | End: 2017-09-21 | Stop reason: DRUGHIGH

## 2017-09-21 NOTE — PROGRESS NOTES
Subjective   Griffin Angeles is a 76 y.o. male.     HPI Comments: F/u for dm 1,hyperlipidemia, ed / testing bs 3 x day last dm eye exam 6/29/17 with dr Acevedo / last dm foot exam 5/15/17 with dr Silver     Diabetes   Hypoglycemia symptoms include confusion and nervousness/anxiousness (depressed ). Associated symptoms include weakness ( pt keeps falling ).   Hyperlipidemia     Erectile Dysfunction   Non-physiologic factors contributing to erectile dysfunction are anxiety (depressed ).   Coronary Artery Disease   Symptoms include leg swelling (cramping during the night ). Risk factors include hyperlipidemia.      Patient is a 75-year-old male who came in for diabetes control. He has known diabetes mellitus for 30 years. He has been on Novolin N 8 units every morning and 8 q hs and Novolin R 8 units every morning and 8 units before supper. He checks his blood sugar 2-3 a day. Blood sugars were higher after he was started on prednisone for chronic colon inflammation.  Fasting blood sugar runs between . Suppertime blood sugar runs between 106-374.   His last meal was 8 AM      His last eye examination was in 6/17 with Dr. Acevedo . He has no diabetic retinopathy but has macular degeneration on left eye.. He had left cataract surgery in August 2017.  He denies numbness, tingling or burning on his hands. He has microalbuminuria on urine sample taken in 11/16. He is on Diovan HCT.      He had previous thyroidectomy for unknown thyroid disease. He has been on levothyroxine 125 mcg per day. He denies any significant weight change.      He has known coronary disease and had previous angioplasty with stent and history of hypertension. He has occasional chest pain but has not used NTG. He had a normal stress test in done in 2015 by Dr. Kong. He is on Toprol and Imdur.  He denies chest pain or SOB.      He has hyperlipidemia and has been on Lipitor 20 mg once a day. He denies any myalgia.      He has postherpetic  "neuralgia and has been taking gabapentin 300 mg 1 times per day as needed.      He has erectile dysfunction and has been seeing Dr. Love. He had penile implant in 8/16.  He was started on Rapaflo prn for urinary retention.      He has intermittent diarrhea for years. He will not be done by Dr. Benoit in August 2017.  Pathology showed mild chronic inflammation of the colon.  The changes are nonspecific but might represent resolving colitis.   He was started on a short course of prednisone.  He denies melena or hematochezia.  He had negative celiac panel in June 2016.    He will be seeing a neurologist due to recent falls at home.  He was seen in an emergency room for transient weakness.  He had a carotid ultrasound but reports is not available    The following portions of the patient's history were reviewed and updated as appropriate: allergies, current medications, past family history, past medical history, past social history, past surgical history and problem list.    Review of Systems   HENT: Negative.    Eyes: Negative.    Respiratory: Negative.    Cardiovascular: Positive for leg swelling (cramping during the night ).   Gastrointestinal: Positive for diarrhea.   Endocrine: Positive for cold intolerance.   Genitourinary: Negative.    Musculoskeletal: Negative.    Skin: Negative.    Allergic/Immunologic: Negative.    Neurological: Positive for weakness ( pt keeps falling ) and numbness (fingers ).   Hematological: Negative.    Psychiatric/Behavioral: Positive for confusion and sleep disturbance. The patient is nervous/anxious (depressed ).        Objective      Vitals:    09/21/17 1120   BP: 124/62   BP Location: Left arm   Patient Position: Sitting   Cuff Size: Small Adult   Pulse: 59   SpO2: 98%   Weight: 131 lb 6.4 oz (59.6 kg)   Height: 67\" (170.2 cm)     Physical Exam   Constitutional: He is oriented to person, place, and time. He appears well-developed. No distress.   HENT:   Head: Normocephalic.   Nose: " Nose normal.   Mouth/Throat: No oropharyngeal exudate.   Eyes: Conjunctivae and EOM are normal. Right eye exhibits no discharge. Left eye exhibits no discharge. No scleral icterus.   Neck: Neck supple. No JVD present. No tracheal deviation present. No thyromegaly present.   Cardiovascular: Normal rate, regular rhythm, normal heart sounds and intact distal pulses.  Exam reveals no gallop and no friction rub.    No murmur heard.  Pulmonary/Chest: Effort normal and breath sounds normal. No respiratory distress. He has no wheezes. He has no rales.   Abdominal: Soft. Bowel sounds are normal. He exhibits no distension and no mass. There is no tenderness.   Musculoskeletal: He exhibits no edema, tenderness or deformity.   Lymphadenopathy:     He has no cervical adenopathy.   Neurological: He is alert and oriented to person, place, and time. He displays normal reflexes.   Intact light touch in both upper and lower extremities   Skin: Skin is warm and dry. No rash noted. No erythema.   Psychiatric: He has a normal mood and affect.     Hospital Outpatient Visit on 09/07/2017   Component Date Value Ref Range Status   • BSA 09/07/2017 1.7  m^2 Preliminary   • IVSd 09/07/2017 0.6  cm Preliminary   • LVIDd 09/07/2017 4.7  cm Preliminary   • LVIDs 09/07/2017 3.2  cm Preliminary   • LVPWd 09/07/2017 0.8  cm Preliminary   • IVS/LVPW 09/07/2017 0.75   Preliminary   • FS 09/07/2017 31.9  % Preliminary   • EDV(Teich) 09/07/2017 102.4  ml Preliminary   • ESV(Teich) 09/07/2017 41.0  ml Preliminary   • EF(Teich) 09/07/2017 60.0  % Preliminary   • EDV(cubed) 09/07/2017 103.8  ml Preliminary   • ESV(cubed) 09/07/2017 32.8  ml Preliminary   • EF(cubed) 09/07/2017 68.4  % Preliminary   • LV mass(C)d 09/07/2017 103.1  grams Preliminary   • LV mass(C)dI 09/07/2017 59.9  grams/m^2 Preliminary   • SV(Teich) 09/07/2017 61.4  ml Preliminary   • SI(Teich) 09/07/2017 35.7  ml/m^2 Preliminary   • SV(cubed) 09/07/2017 71.1  ml Preliminary   • SI(cubed)  09/07/2017 41.3  ml/m^2 Preliminary   • Ao root diam 09/07/2017 2.7  cm Preliminary   • Ao root area 09/07/2017 5.7  cm^2 Preliminary   • ACS 09/07/2017 1.6  cm Preliminary   • asc Aorta Diam 09/07/2017 2.9  cm Preliminary   • LVOT diam 09/07/2017 1.9  cm Preliminary   • LVOT area 09/07/2017 2.8  cm^2 Preliminary   • LVOT area(traced) 09/07/2017 2.8  cm^2 Preliminary   • LVLd ap4 09/07/2017 6.6  cm Preliminary   • EDV(MOD-sp4) 09/07/2017 61.0  ml Preliminary   • LVLs ap4 09/07/2017 6.5  cm Preliminary   • ESV(MOD-sp4) 09/07/2017 26.0  ml Preliminary   • EF(MOD-sp4) 09/07/2017 57.4  % Preliminary   • LVLd ap2 09/07/2017 6.7  cm Preliminary   • EDV(MOD-sp2) 09/07/2017 55.0  ml Preliminary   • LVLs ap2 09/07/2017 5.6  cm Preliminary   • ESV(MOD-sp2) 09/07/2017 21.0  ml Preliminary   • EF(MOD-sp2) 09/07/2017 61.8  % Preliminary   • SV(MOD-sp4) 09/07/2017 35.0  ml Preliminary   • SI(MOD-sp4) 09/07/2017 20.3  ml/m^2 Preliminary   • SV(MOD-sp2) 09/07/2017 34.0  ml Preliminary   • SI(MOD-sp2) 09/07/2017 19.7  ml/m^2 Preliminary   • Ao root area (BSA corrected) 09/07/2017 1.6   Preliminary   • Ao root area (BSA corrected) 09/07/2017 61.8  ml/m^2 Preliminary   • CONTRAST EF 4CH 09/07/2017 57.4  ml/m^2 Preliminary   • LV Diastolic corrected for BSA 09/07/2017 35.4  ml/m^2 Preliminary   • LV Systolic corrected for BSA 09/07/2017 15.1  ml/m^2 Preliminary   • MV A dur 09/07/2017 0.11  sec Preliminary   • MV E max mela 09/07/2017 73.3  cm/sec Preliminary   • MV A max mela 09/07/2017 102.0  cm/sec Preliminary   • MV E/A 09/07/2017 0.72   Preliminary   • MV V2 max 09/07/2017 108.0  cm/sec Preliminary   • MV max PG 09/07/2017 4.7  mmHg Preliminary   • MV V2 mean 09/07/2017 56.1  cm/sec Preliminary   • MV mean PG 09/07/2017 2.0  mmHg Preliminary   • MV V2 VTI 09/07/2017 36.2  cm Preliminary   • MVA(VTI) 09/07/2017 2.1  cm^2 Preliminary   • MV P1/2t max mela 09/07/2017 99.1  cm/sec Preliminary   • MV P1/2t 09/07/2017 65.4  msec  Preliminary   • MVA(P1/2t) 09/07/2017 3.4  cm^2 Preliminary   • MV dec slope 09/07/2017 444.0  cm/sec^2 Preliminary   • MV dec time 09/07/2017 0.19  sec Preliminary   • Ao pk hugo 09/07/2017 195.0  cm/sec Preliminary   • Ao max PG 09/07/2017 15.2  mmHg Preliminary   • Ao max PG (full) 09/07/2017 7.4  mmHg Preliminary   • Ao V2 mean 09/07/2017 124.0  cm/sec Preliminary   • Ao mean PG 09/07/2017 7.0  mmHg Preliminary   • Ao mean PG (full) 09/07/2017 4.0  mmHg Preliminary   • Ao V2 VTI 09/07/2017 39.8  cm Preliminary   • TIMOTEO(I,A) 09/07/2017 2.0  cm^2 Preliminary   • TIMOTEO(I,D) 09/07/2017 2.0  cm^2 Preliminary   • TIMOTEO(V,A) 09/07/2017 2.0  cm^2 Preliminary   • TIMOTEO(V,D) 09/07/2017 2.0  cm^2 Preliminary   • LV V1 max PG 09/07/2017 7.8  mmHg Preliminary   • LV V1 mean PG 09/07/2017 3.0  mmHg Preliminary   • LV V1 max 09/07/2017 140.0  cm/sec Preliminary   • LV V1 mean 09/07/2017 72.1  cm/sec Preliminary   • LV V1 VTI 09/07/2017 27.4  cm Preliminary   • MR max hugo 09/07/2017 400.0  cm/sec Preliminary   • MR max PG 09/07/2017 64.0  mmHg Preliminary   • SV(Ao) 09/07/2017 227.9  ml Preliminary   • SI(Ao) 09/07/2017 132.4  ml/m^2 Preliminary   • SV(LVOT) 09/07/2017 77.7  ml Preliminary   • SI(LVOT) 09/07/2017 45.1  ml/m^2 Preliminary   • PA V2 max 09/07/2017 124.0  cm/sec Preliminary   • PA max PG 09/07/2017 6.2  mmHg Preliminary   • PA acc time 09/07/2017 0.1  sec Preliminary   • PI end-d hugo 09/07/2017 118.0  cm/sec Preliminary   • TR max hugo 09/07/2017 258.0  cm/sec Preliminary   • PA pr(Accel) 09/07/2017 34.0  mmHg Preliminary   • Pulm Sys Hugo 09/07/2017 108.0  cm/sec Preliminary   • Pulm Parker Hugo 09/07/2017 61.8  cm/sec Preliminary   • Pulm S/D 09/07/2017 1.7   Preliminary   • Pulm A Revs Dur 09/07/2017 0.1  sec Preliminary   • Pulm A Revs Hugo 09/07/2017 42.1  cm/sec Preliminary   • MVA P1/2T LCG 09/07/2017 2.2  cm^2 Preliminary   • BH CV ECHO NIRANJAN - BZI_BMI 09/07/2017 21.5  kilograms/m^2 Preliminary   • [facility] 09/07/2017 1.7  m^2 Preliminary   •  CV ECHO NIRANJAN - BZI_METRIC_WEIGHT 09/07/2017 62.1  kg Preliminary   •  CV ECHO NIRANJAN - BZI_METRIC_HEIGHT 09/07/2017 170.2  cm Preliminary   •  CV VAS BP LEFT ARM 09/07/2017 128/63  mmHg Final     Assessment/Plan   Griffin was seen today for diabetes, hyperlipidemia, erectile dysfunction and coronary artery disease.    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  Continue Novolin N 8 units at bedtime and Novolin R 8 units before supper.  Check hemoglobin A1c.  Take gabapentin 300 mg twice a day regularly.  Continue Diovan HCT.  Continue Toprol and Imdur per Dr. Kong.  Continue Lipitor 20 mg once a day.  Flu vac next month.    Send copy of my note and labs to Dr. Quevedo and Dr. Kong.    RTC 4 mos

## 2017-09-22 LAB
ALBUMIN SERPL-MCNC: 3.9 G/DL (ref 3.5–5.2)
ALBUMIN/GLOB SERPL: 1.8 G/DL
ALDOLASE SERPL-CCNC: 7.9 U/L (ref 3.3–10.3)
ALP SERPL-CCNC: 124 U/L (ref 39–117)
ALT SERPL-CCNC: 26 U/L (ref 1–41)
AST SERPL-CCNC: 11 U/L (ref 1–40)
BASOPHILS # BLD AUTO: 0.02 10*3/MM3 (ref 0–0.2)
BASOPHILS NFR BLD AUTO: 0.1 % (ref 0–1.5)
BILIRUB SERPL-MCNC: 0.7 MG/DL (ref 0.1–1.2)
BUN SERPL-MCNC: 30 MG/DL (ref 8–23)
BUN/CREAT SERPL: 24.4 (ref 7–25)
CALCIUM SERPL-MCNC: 10.3 MG/DL (ref 8.6–10.5)
CHLORIDE SERPL-SCNC: 99 MMOL/L (ref 98–107)
CHOLEST SERPL-MCNC: 179 MG/DL (ref 0–200)
CK SERPL-CCNC: 66 U/L (ref 20–200)
CO2 SERPL-SCNC: 32 MMOL/L (ref 22–29)
CREAT SERPL-MCNC: 1.23 MG/DL (ref 0.76–1.27)
EOSINOPHIL # BLD AUTO: 0.12 10*3/MM3 (ref 0–0.7)
EOSINOPHIL NFR BLD AUTO: 0.8 % (ref 0.3–6.2)
ERYTHROCYTE [DISTWIDTH] IN BLOOD BY AUTOMATED COUNT: 13.9 % (ref 11.5–14.5)
GLOBULIN SER CALC-MCNC: 2.2 GM/DL
GLUCOSE SERPL-MCNC: 67 MG/DL (ref 65–99)
HBA1C MFR BLD: 8.4 % (ref 4.8–5.6)
HCT VFR BLD AUTO: 45.9 % (ref 40.4–52.2)
HDLC SERPL-MCNC: 93 MG/DL (ref 40–60)
HGB BLD-MCNC: 15 G/DL (ref 13.7–17.6)
IMM GRANULOCYTES # BLD: 0.08 10*3/MM3 (ref 0–0.03)
IMM GRANULOCYTES NFR BLD: 0.5 % (ref 0–0.5)
LDLC SERPL CALC-MCNC: 65 MG/DL (ref 0–100)
LYMPHOCYTES # BLD AUTO: 2.67 10*3/MM3 (ref 0.9–4.8)
LYMPHOCYTES NFR BLD AUTO: 17.5 % (ref 19.6–45.3)
MCH RBC QN AUTO: 31.3 PG (ref 27–32.7)
MCHC RBC AUTO-ENTMCNC: 32.7 G/DL (ref 32.6–36.4)
MCV RBC AUTO: 95.8 FL (ref 79.8–96.2)
MONOCYTES # BLD AUTO: 1.11 10*3/MM3 (ref 0.2–1.2)
MONOCYTES NFR BLD AUTO: 7.3 % (ref 5–12)
NEUTROPHILS # BLD AUTO: 11.28 10*3/MM3 (ref 1.9–8.1)
NEUTROPHILS NFR BLD AUTO: 73.8 % (ref 42.7–76)
PLATELET # BLD AUTO: 162 10*3/MM3 (ref 140–500)
POTASSIUM SERPL-SCNC: 4 MMOL/L (ref 3.5–5.2)
PROT SERPL-MCNC: 6.1 G/DL (ref 6–8.5)
RBC # BLD AUTO: 4.79 10*6/MM3 (ref 4.6–6)
SODIUM SERPL-SCNC: 141 MMOL/L (ref 136–145)
T4 FREE SERPL-MCNC: 1.87 NG/DL (ref 0.93–1.7)
TRIGL SERPL-MCNC: 106 MG/DL (ref 0–150)
TSH SERPL DL<=0.005 MIU/L-ACNC: 1.29 MIU/ML (ref 0.27–4.2)
VIT B12 SERPL-MCNC: 1033 PG/ML (ref 211–946)
VLDLC SERPL CALC-MCNC: 21.2 MG/DL (ref 5–40)
WBC # BLD AUTO: 15.28 10*3/MM3 (ref 4.5–10.7)

## 2017-09-22 RX ORDER — LEVOTHYROXINE SODIUM 0.1 MG/1
TABLET ORAL
Qty: 90 TABLET | Refills: 1 | Status: SHIPPED | OUTPATIENT
Start: 2017-09-22 | End: 2018-04-04 | Stop reason: SDUPTHER

## 2017-10-30 DIAGNOSIS — E10.29 TYPE 1 DIABETES MELLITUS WITH PERSISTENT MICROALBUMINURIA (HCC): ICD-10-CM

## 2017-10-30 DIAGNOSIS — E78.5 HYPERLIPIDEMIA: ICD-10-CM

## 2017-10-30 DIAGNOSIS — E03.9 HYPOTHYROIDISM (ACQUIRED): ICD-10-CM

## 2017-10-30 DIAGNOSIS — I25.10 CORONARY ARTERY DISEASE INVOLVING NATIVE CORONARY ARTERY OF NATIVE HEART WITHOUT ANGINA PECTORIS: ICD-10-CM

## 2017-10-30 DIAGNOSIS — N52.9 ERECTILE DYSFUNCTION, UNSPECIFIED ERECTILE DYSFUNCTION TYPE: ICD-10-CM

## 2017-10-30 DIAGNOSIS — R80.9 TYPE 1 DIABETES MELLITUS WITH PERSISTENT MICROALBUMINURIA (HCC): ICD-10-CM

## 2017-11-28 DIAGNOSIS — E78.5 HYPERLIPIDEMIA: ICD-10-CM

## 2017-11-28 DIAGNOSIS — E10.29 TYPE 1 DIABETES MELLITUS WITH PERSISTENT MICROALBUMINURIA (HCC): ICD-10-CM

## 2017-11-28 DIAGNOSIS — N52.9 ERECTILE DYSFUNCTION, UNSPECIFIED ERECTILE DYSFUNCTION TYPE: ICD-10-CM

## 2017-11-28 DIAGNOSIS — R80.9 TYPE 1 DIABETES MELLITUS WITH PERSISTENT MICROALBUMINURIA (HCC): ICD-10-CM

## 2017-11-28 DIAGNOSIS — I25.10 CORONARY ARTERY DISEASE INVOLVING NATIVE CORONARY ARTERY OF NATIVE HEART WITHOUT ANGINA PECTORIS: ICD-10-CM

## 2017-11-28 DIAGNOSIS — E03.9 HYPOTHYROIDISM (ACQUIRED): ICD-10-CM

## 2017-12-07 RX ORDER — CLOPIDOGREL BISULFATE 75 MG/1
TABLET ORAL
Qty: 90 TABLET | Refills: 3 | Status: SHIPPED | OUTPATIENT
Start: 2017-12-07 | End: 2018-12-19 | Stop reason: SDUPTHER

## 2017-12-07 RX ORDER — ISOSORBIDE MONONITRATE 30 MG/1
TABLET, EXTENDED RELEASE ORAL
Qty: 90 TABLET | Refills: 3 | Status: SHIPPED | OUTPATIENT
Start: 2017-12-07 | End: 2019-11-08 | Stop reason: SDUPTHER

## 2018-01-31 ENCOUNTER — OFFICE VISIT (OUTPATIENT)
Dept: ENDOCRINOLOGY | Age: 78
End: 2018-01-31

## 2018-01-31 VITALS
WEIGHT: 145.6 LBS | DIASTOLIC BLOOD PRESSURE: 80 MMHG | OXYGEN SATURATION: 97 % | HEART RATE: 67 BPM | BODY MASS INDEX: 22.85 KG/M2 | HEIGHT: 67 IN | SYSTOLIC BLOOD PRESSURE: 142 MMHG

## 2018-01-31 DIAGNOSIS — E10.8 TYPE 1 DIABETES MELLITUS WITH COMPLICATIONS (HCC): ICD-10-CM

## 2018-01-31 DIAGNOSIS — I25.10 CORONARY ARTERY DISEASE INVOLVING NATIVE CORONARY ARTERY OF NATIVE HEART WITHOUT ANGINA PECTORIS: Primary | ICD-10-CM

## 2018-01-31 DIAGNOSIS — E78.5 HYPERLIPIDEMIA, UNSPECIFIED HYPERLIPIDEMIA TYPE: ICD-10-CM

## 2018-01-31 DIAGNOSIS — I63.81 MULTIPLE LACUNAR INFARCTS (HCC): ICD-10-CM

## 2018-01-31 DIAGNOSIS — B02.29 POSTHERPETIC NEURALGIA: ICD-10-CM

## 2018-01-31 DIAGNOSIS — I10 ESSENTIAL HYPERTENSION: ICD-10-CM

## 2018-01-31 DIAGNOSIS — E89.0 POSTOPERATIVE HYPOTHYROIDISM: ICD-10-CM

## 2018-01-31 PROCEDURE — 99214 OFFICE O/P EST MOD 30 MIN: CPT | Performed by: INTERNAL MEDICINE

## 2018-01-31 NOTE — PROGRESS NOTES
Subjective   Griffin Angeles is a 77 y.o. male.     HPI Comments: F/u for dm1,hyperlipidemia, ED/ testing bs 3-4 x day / last dm eye exam 6/29/17 with dr Acevedo/ last dm foot exam today with dr Waldrop     Graves' Disease   Associated symptoms include abdominal pain.   Hyperlipidemia     Erectile Dysfunction   Non-physiologic factors contributing to erectile dysfunction are anxiety.      Patient is a 77-year-old male who came in for diabetes control. He has known diabetes mellitus for 30 years. He has been on Novolin N 4-8 units every morning and 4-8 q hs and Novolin R 6-8 units every morning and 4-8 units before supper. He checks his blood sugar 2-3 a day. Blood sugars were higher after he was started on prednisone for chronic colon inflammation.  Fasting blood sugar runs between . Suppertime blood sugar runs between .  He has few hypoglycemic episodes but did not require glucagon injection.  His last meal was 8:30 AM      His last eye examination was in 6/17 with Dr. Acevedo . He has no diabetic retinopathy but has macular degeneration on left eye. He had left cataract surgery in August 2017.  He denies numbness, tingling or burning on his hands. He has microalbuminuria on urine sample taken in 11/16. He is on Diovan HCT.      He had previous thyroidectomy for unknown thyroid disease. He has been on levothyroxine 100 mcg per day. He denies any significant weight change.      He has known coronary disease and had previous angioplasty with stent and history of hypertension. He has occasional chest pain but has not used NTG. He had a normal stress test in done in 2015 by Dr. Kong. He is on Toprol and Imdur.  He denies chest pain or SOB.      He has hyperlipidemia and has been on Lipitor 20 mg once a day. He denies any myalgia.      He has postherpetic neuralgia and has been taking gabapentin 300 mg BID.    He was seen for gait abnormality last September 2017 by Dr. Mcdonald.  MRI showed bilateral  "small lacunar infarction of the basal ganglia and chronic appearing hydrocephalus.  He has been seen by neurologist at the [facility] and patient is now part of the study.  He is not on medications.  There is no associated urinary incontinence or headaches.  He has difficulty with short-term memory.  He has not been taking aspirin.  They do not remember whether he had a carotid ultrasound.      He has erectile dysfunction and has been seeing Dr. Love. He had penile implant in 8/16.  He was started on Rapaflo prn for urinary retention.    The following portions of the patient's history were reviewed and updated as appropriate: allergies, current medications, past family history, past medical history, past social history, past surgical history and problem list.    Review of Systems   Constitutional: Negative.    HENT: Negative.    Eyes: Negative.    Respiratory: Negative.    Cardiovascular: Negative.    Gastrointestinal: Positive for abdominal pain.   Endocrine: Negative.    Genitourinary: Negative.    Musculoskeletal: Positive for back pain.   Skin: Negative.    Allergic/Immunologic: Negative.    Neurological: Negative.    Hematological: Bruises/bleeds easily.   Psychiatric/Behavioral: Positive for confusion. The patient is nervous/anxious.        Objective      Vitals:    01/31/18 1100   BP: 142/80   BP Location: Left arm   Patient Position: Sitting   Cuff Size: Small Adult   Pulse: 67   SpO2: 97%   Weight: 66 kg (145 lb 9.6 oz)   Height: 170.2 cm (67.01\")     Physical Exam   Constitutional: He appears well-developed and well-nourished. No distress.   HENT:   Head: Normocephalic.   Nose: Nose normal.   Mouth/Throat: No oropharyngeal exudate.   Eyes: Conjunctivae and EOM are normal. Right eye exhibits no discharge. Left eye exhibits no discharge. No scleral icterus.   Neck: Neck supple. No JVD present. No tracheal deviation present. No thyromegaly present.   Cardiovascular: Normal rate, regular rhythm, " normal heart sounds and intact distal pulses.  Exam reveals no gallop and no friction rub.    No murmur heard.  Pulmonary/Chest: Effort normal and breath sounds normal. No respiratory distress. He has no wheezes. He has no rales. He exhibits no tenderness.   Abdominal: Soft. Bowel sounds are normal. He exhibits no distension and no mass. There is no tenderness.   Musculoskeletal: Normal range of motion. He exhibits no edema, tenderness or deformity.   No plantar ulcers   Lymphadenopathy:     He has no cervical adenopathy.   Neurological: He displays normal reflexes.   Intact light touch and proprioception on both lower ext.   Skin: Skin is warm and dry. No erythema.   Psychiatric: He has a normal mood and affect. His behavior is normal.     Office Visit on 09/21/2017   Component Date Value Ref Range Status   • Glucose 09/21/2017 67  65 - 99 mg/dL Final   • BUN 09/21/2017 30* 8 - 23 mg/dL Final   • Creatinine 09/21/2017 1.23  0.76 - 1.27 mg/dL Final   • eGFR Non  Am 09/21/2017 57* >60 mL/min/1.73 Final    Comment: The MDRD GFR formula is only valid for adults with stable  renal function between ages 18 and 70.     • eGFR  Am 09/21/2017 70  >60 mL/min/1.73 Final   • BUN/Creatinine Ratio 09/21/2017 24.4  7.0 - 25.0 Final   • Sodium 09/21/2017 141  136 - 145 mmol/L Final   • Potassium 09/21/2017 4.0  3.5 - 5.2 mmol/L Final   • Chloride 09/21/2017 99  98 - 107 mmol/L Final   • Total CO2 09/21/2017 32.0* 22.0 - 29.0 mmol/L Final   • Calcium 09/21/2017 10.3  8.6 - 10.5 mg/dL Final   • Total Protein 09/21/2017 6.1  6.0 - 8.5 g/dL Final   • Albumin 09/21/2017 3.90  3.50 - 5.20 g/dL Final   • Globulin 09/21/2017 2.2  gm/dL Final   • A/G Ratio 09/21/2017 1.8  g/dL Final   • Total Bilirubin 09/21/2017 0.7  0.1 - 1.2 mg/dL Final   • Alkaline Phosphatase 09/21/2017 124* 39 - 117 U/L Final   • AST (SGOT) 09/21/2017 11  1 - 40 U/L Final   • ALT (SGPT) 09/21/2017 26  1 - 41 U/L Final   • WBC 09/21/2017 15.28* 4.50 -  10.70 10*3/mm3 Final   • RBC 09/21/2017 4.79  4.60 - 6.00 10*6/mm3 Final   • Hemoglobin 09/21/2017 15.0  13.7 - 17.6 g/dL Final   • Hematocrit 09/21/2017 45.9  40.4 - 52.2 % Final   • MCV 09/21/2017 95.8  79.8 - 96.2 fL Final   • MCH 09/21/2017 31.3  27.0 - 32.7 pg Final   • MCHC 09/21/2017 32.7  32.6 - 36.4 g/dL Final   • RDW 09/21/2017 13.9  11.5 - 14.5 % Final   • Platelets 09/21/2017 162  140 - 500 10*3/mm3 Final   • Neutrophil Rel % 09/21/2017 73.8  42.7 - 76.0 % Final   • Lymphocyte Rel % 09/21/2017 17.5* 19.6 - 45.3 % Final   • Monocyte Rel % 09/21/2017 7.3  5.0 - 12.0 % Final   • Eosinophil Rel % 09/21/2017 0.8  0.3 - 6.2 % Final   • Basophil Rel % 09/21/2017 0.1  0.0 - 1.5 % Final   • Neutrophils Absolute 09/21/2017 11.28* 1.90 - 8.10 10*3/mm3 Final   • Lymphocytes Absolute 09/21/2017 2.67  0.90 - 4.80 10*3/mm3 Final   • Monocytes Absolute 09/21/2017 1.11  0.20 - 1.20 10*3/mm3 Final   • Eosinophils Absolute 09/21/2017 0.12  0.00 - 0.70 10*3/mm3 Final   • Basophils Absolute 09/21/2017 0.02  0.00 - 0.20 10*3/mm3 Final   • Immature Granulocyte Rel % 09/21/2017 0.5  0.0 - 0.5 % Final   • Immature Grans Absolute 09/21/2017 0.08* 0.00 - 0.03 10*3/mm3 Final   • Hemoglobin A1C 09/21/2017 8.40* 4.80 - 5.60 % Final    Comment: Hemoglobin A1C Ranges:  Increased Risk for Diabetes  5.7% to 6.4%  Diabetes                     >= 6.5%  Diabetic Goal                < 7.0%     • Total Cholesterol 09/21/2017 179  0 - 200 mg/dL Final   • Triglycerides 09/21/2017 106  0 - 150 mg/dL Final   • HDL Cholesterol 09/21/2017 93* 40 - 60 mg/dL Final   • VLDL Cholesterol 09/21/2017 21.2  5 - 40 mg/dL Final   • LDL Cholesterol  09/21/2017 65  0 - 100 mg/dL Final   • TSH 09/21/2017 1.290  0.270 - 4.200 mIU/mL Final   • Free T4 09/21/2017 1.87* 0.93 - 1.70 ng/dL Final   • Creatine Kinase 09/21/2017 66  20 - 200 U/L Final   • Aldolase 09/21/2017 7.9  3.3 - 10.3 U/L Final   • Vitamin B-12 09/21/2017 1033* 211 - 946 pg/mL Final      Assessment/Plan   Griffin was seen today for graves' disease, hyperlipidemia and erectile dysfunction.    Diagnoses and all orders for this visit:    Coronary artery disease involving native coronary artery of native heart without angina pectoris  -     Lipid Panel    Essential hypertension  -     Comprehensive Metabolic Panel    Hyperlipidemia, unspecified hyperlipidemia type  -     Comprehensive Metabolic Panel  -     Lipid Panel  -     TSH  -     T4, Free    Multiple lacunar infarcts  -     Lipid Panel    Postherpetic neuralgia    Type 1 diabetes mellitus with complications  -     Comprehensive Metabolic Panel  -     Hemoglobin A1c  -     TSH  -     T4, Free  -     Microalbumin / Creatinine Urine Ratio - Urine, Clean Catch    Postoperative hypothyroidism      Continue Novolin N and Novolin R.  Check hemoglobin A1c and urine microalbumin.  Continue Toprol, Imdur, and Diovan HCT.  Continue Lipitor 20 mg once a day.  Continue gabapentin 300 mg twice a day.  Advised to check with Dr. Quevedo whether he had carotid ultrasound at [place].  Restart aspirin 81 mg once a day.    Continue levothyroxine    Send copy of my notes and labs to Dr. Quevedo.    RTC 4 mos.

## 2018-02-01 LAB
ALBUMIN SERPL-MCNC: 4.1 G/DL (ref 3.5–4.8)
ALBUMIN/CREAT UR: 639.6 MG/G CREAT (ref 0–30)
ALBUMIN/GLOB SERPL: 1.5 {RATIO} (ref 1.2–2.2)
ALP SERPL-CCNC: 151 IU/L (ref 39–117)
ALT SERPL-CCNC: 15 IU/L (ref 0–44)
AST SERPL-CCNC: 18 IU/L (ref 0–40)
BILIRUB SERPL-MCNC: 0.5 MG/DL (ref 0–1.2)
BUN SERPL-MCNC: 22 MG/DL (ref 8–27)
BUN/CREAT SERPL: 20 (ref 10–24)
CALCIUM SERPL-MCNC: 9.7 MG/DL (ref 8.6–10.2)
CHLORIDE SERPL-SCNC: 103 MMOL/L (ref 96–106)
CHOLEST SERPL-MCNC: 137 MG/DL (ref 100–199)
CO2 SERPL-SCNC: 27 MMOL/L (ref 18–29)
CREAT SERPL-MCNC: 1.1 MG/DL (ref 0.76–1.27)
CREAT UR-MCNC: 76.8 MG/DL
GFR SERPLBLD CREATININE-BSD FMLA CKD-EPI: 65 ML/MIN/1.73
GFR SERPLBLD CREATININE-BSD FMLA CKD-EPI: 75 ML/MIN/1.73
GLOBULIN SER CALC-MCNC: 2.7 G/DL (ref 1.5–4.5)
GLUCOSE SERPL-MCNC: 96 MG/DL (ref 65–99)
HBA1C MFR BLD: 7.2 % (ref 4.8–5.6)
HDLC SERPL-MCNC: 63 MG/DL
INTERPRETATION: NORMAL
LDLC SERPL CALC-MCNC: 49 MG/DL (ref 0–99)
Lab: NORMAL
MICROALBUMIN UR-MCNC: 491.2 UG/ML
POTASSIUM SERPL-SCNC: 4.4 MMOL/L (ref 3.5–5.2)
PROT SERPL-MCNC: 6.8 G/DL (ref 6–8.5)
SODIUM SERPL-SCNC: 145 MMOL/L (ref 134–144)
T4 FREE SERPL-MCNC: 1.6 NG/DL (ref 0.82–1.77)
TRIGL SERPL-MCNC: 127 MG/DL (ref 0–149)
TSH SERPL DL<=0.005 MIU/L-ACNC: 0.5 UIU/ML (ref 0.45–4.5)
VLDLC SERPL CALC-MCNC: 25 MG/DL (ref 5–40)

## 2018-03-02 RX ORDER — LANCETS
EACH MISCELLANEOUS
Qty: 300 EACH | Refills: 1 | Status: SHIPPED | OUTPATIENT
Start: 2018-03-02 | End: 2018-09-17 | Stop reason: SDUPTHER

## 2018-03-05 DIAGNOSIS — E10.43 TYPE 1 DIABETES MELLITUS WITH AUTONOMIC NEUROPATHY (HCC): ICD-10-CM

## 2018-04-04 RX ORDER — LEVOTHYROXINE SODIUM 0.1 MG/1
TABLET ORAL
Qty: 90 TABLET | Refills: 1 | Status: SHIPPED | OUTPATIENT
Start: 2018-04-04 | End: 2018-12-20 | Stop reason: SDUPTHER

## 2018-06-04 RX ORDER — SYRINGE AND NEEDLE,INSULIN,1ML 31GX15/64"
SYRINGE, EMPTY DISPOSABLE MISCELLANEOUS
Qty: 300 EACH | Refills: 0 | Status: SHIPPED | OUTPATIENT
Start: 2018-06-04 | End: 2018-11-28 | Stop reason: SDUPTHER

## 2018-06-15 ENCOUNTER — OFFICE VISIT (OUTPATIENT)
Dept: ENDOCRINOLOGY | Age: 78
End: 2018-06-15

## 2018-06-15 VITALS
HEART RATE: 67 BPM | BODY MASS INDEX: 21.85 KG/M2 | OXYGEN SATURATION: 98 % | HEIGHT: 67 IN | DIASTOLIC BLOOD PRESSURE: 60 MMHG | WEIGHT: 139.2 LBS | SYSTOLIC BLOOD PRESSURE: 106 MMHG

## 2018-06-15 DIAGNOSIS — E10.29 TYPE 1 DIABETES MELLITUS WITH PROTEINURIA (HCC): ICD-10-CM

## 2018-06-15 DIAGNOSIS — E10.43 TYPE 1 DIABETES MELLITUS WITH AUTONOMIC NEUROPATHY (HCC): ICD-10-CM

## 2018-06-15 DIAGNOSIS — R80.9 TYPE 1 DIABETES MELLITUS WITH PROTEINURIA (HCC): ICD-10-CM

## 2018-06-15 DIAGNOSIS — E10.649 HYPOGLYCEMIC UNAWARENESS ASSOCIATED WITH TYPE 1 DIABETES MELLITUS: ICD-10-CM

## 2018-06-15 DIAGNOSIS — E78.5 HYPERLIPIDEMIA, UNSPECIFIED HYPERLIPIDEMIA TYPE: ICD-10-CM

## 2018-06-15 DIAGNOSIS — E89.0 POSTOPERATIVE HYPOTHYROIDISM: ICD-10-CM

## 2018-06-15 DIAGNOSIS — I10 ESSENTIAL HYPERTENSION: ICD-10-CM

## 2018-06-15 DIAGNOSIS — E10.8 TYPE 1 DIABETES MELLITUS WITH COMPLICATIONS (HCC): Primary | ICD-10-CM

## 2018-06-15 DIAGNOSIS — I25.10 CORONARY ARTERY DISEASE INVOLVING NATIVE CORONARY ARTERY OF NATIVE HEART WITHOUT ANGINA PECTORIS: ICD-10-CM

## 2018-06-15 PROCEDURE — 99214 OFFICE O/P EST MOD 30 MIN: CPT | Performed by: INTERNAL MEDICINE

## 2018-06-15 NOTE — PROGRESS NOTES
Subjective   Griffin Angeles is a 77 y.o. male.     F/u for dm 1, hyperlipidemia, ED/ testing bs 3-4 x day / last dm eye exam 6/29/17 with dr Acevedo / last dm foot exam 1/31/18 with dr Waldrop       Diabetes   Associated symptoms include fatigue.   Hyperlipidemia     Erectile Dysfunction        Patient is a 77-year-old male who came in for diabetes control. He has known diabetes mellitus for 30 years. He has been on Novolin N 4 units every morning and 4-6 q hs and Novolin R 6-8 units every morning and 4-8 units before supper.  He eats a snack between lunch and supper. He checks his blood sugar 2-3 a day.  He has been taking extra Novolin N at supper time.  Fasting blood sugar runs between . Suppertime blood sugar runs between .  He has few hypoglycemic episodes but did not require glucagon injection.  His last meal was 12 PM.      His last eye examination was in 6/17 with Dr. Acevedo and has an appt next week. He has no diabetic retinopathy but has macular degeneration on left eye. He had left cataract surgery in August 2017.  He denies numbness, tingling or burning on his hands. He has albuminuria on urine sample taken in 1/18. He is on Diovan HCT.      He had previous thyroidectomy for unknown thyroid disease. He has been on levothyroxine 100 mcg per day. He denies any significant weight change.      He has known coronary disease and had previous angioplasty with stent and history of hypertension. He has occasional chest pain but has not used NTG. He had a normal stress test in done in 2015 by Dr. Kong. He is on Toprol and Imdur.  He denies chest pain or SOB.      He has hyperlipidemia and has been on Lipitor 20 mg once a day. He denies any myalgia.      He has postherpetic neuralgia and has been taking gabapentin 300 mg BID.     He was seen for gait abnormality last September 2017 by Dr. Mcdonald.  MRI showed bilateral small lacunar infarction of the basal ganglia and chronic appearing  "hydrocephalus.  He has been seen by neurologist at the [facility] and patient is now part of the study.  He is not on medications.  There is no associated urinary incontinence or headaches.  He has difficulty with short-term memory.  He has not been taking aspirin.  They do not remember whether he had a carotid ultrasound.  He is seeing neurologist Dr. Darren Huang and is part of a study at the [facility].  He was started on an unknown medication      He has erectile dysfunction and has been seeing Dr. Love. He had penile implant in 8/16.  He was started on Rapaflo prn for urinary retention.    The following portions of the patient's history were reviewed and updated as appropriate: allergies, current medications, past family history, past medical history, past social history, past surgical history and problem list.    Review of Systems   Constitutional: Positive for fatigue.   HENT: Negative.    Eyes: Negative.    Respiratory: Negative.    Cardiovascular: Negative.    Gastrointestinal: Positive for abdominal distention (bloating ) and diarrhea.   Endocrine: Negative.    Genitourinary: Negative.    Musculoskeletal: Negative.    Skin: Negative.    Allergic/Immunologic: Negative.    Neurological: Positive for numbness (fingers ).   Hematological: Bruises/bleeds easily.       Objective      Vitals:    06/15/18 1537   BP: 106/60   BP Location: Left arm   Patient Position: Sitting   Cuff Size: Small Adult   Pulse: 67   SpO2: 98%   Weight: 63.1 kg (139 lb 3.2 oz)   Height: 170.2 cm (67.01\")     Physical Exam   Constitutional: He is oriented to person, place, and time. He appears well-developed and well-nourished.   HENT:   Head: Normocephalic.   Nose: Nose normal.   Mouth/Throat: No oropharyngeal exudate.   Eyes: Conjunctivae and EOM are normal. Right eye exhibits no discharge. Left eye exhibits no discharge. No scleral icterus.   Neck: Neck supple. No JVD present. No tracheal deviation present. No " thyromegaly present.   Cardiovascular: Normal rate, regular rhythm, normal heart sounds and intact distal pulses. Exam reveals no gallop and no friction rub.   No murmur heard.  Pulmonary/Chest: Effort normal and breath sounds normal. No stridor. No respiratory distress. He has no wheezes. He has no rales. He exhibits no tenderness.   Abdominal: Soft. Bowel sounds are normal. He exhibits no distension. There is no tenderness. There is no guarding.   Musculoskeletal: He exhibits no edema, tenderness or deformity.   Lymphadenopathy:     He has no cervical adenopathy.   Neurological: He is oriented to person, place, and time. He displays normal reflexes.   Intact light touch on lower extremities   Skin: Skin is warm and dry. No rash noted. No erythema.   Psychiatric: He has a normal mood and affect. His behavior is normal.     Office Visit on 01/31/2018   Component Date Value Ref Range Status   • Glucose 01/31/2018 96  65 - 99 mg/dL Final   • BUN 01/31/2018 22  8 - 27 mg/dL Final   • Creatinine 01/31/2018 1.10  0.76 - 1.27 mg/dL Final   • eGFR Non  Am 01/31/2018 65  >59 mL/min/1.73 Final   • eGFR African Am 01/31/2018 75  >59 mL/min/1.73 Final   • BUN/Creatinine Ratio 01/31/2018 20  10 - 24 Final   • Sodium 01/31/2018 145* 134 - 144 mmol/L Final   • Potassium 01/31/2018 4.4  3.5 - 5.2 mmol/L Final   • Chloride 01/31/2018 103  96 - 106 mmol/L Final   • Total CO2 01/31/2018 27  18 - 29 mmol/L Final   • Calcium 01/31/2018 9.7  8.6 - 10.2 mg/dL Final   • Total Protein 01/31/2018 6.8  6.0 - 8.5 g/dL Final   • Albumin 01/31/2018 4.1  3.5 - 4.8 g/dL Final   • Globulin 01/31/2018 2.7  1.5 - 4.5 g/dL Final   • A/G Ratio 01/31/2018 1.5  1.2 - 2.2 Final   • Total Bilirubin 01/31/2018 0.5  0.0 - 1.2 mg/dL Final   • Alkaline Phosphatase 01/31/2018 151* 39 - 117 IU/L Final   • AST (SGOT) 01/31/2018 18  0 - 40 IU/L Final   • ALT (SGPT) 01/31/2018 15  0 - 44 IU/L Final   • Total Cholesterol 01/31/2018 137  100 - 199 mg/dL Final    • Triglycerides 01/31/2018 127  0 - 149 mg/dL Final   • HDL Cholesterol 01/31/2018 63  >39 mg/dL Final   • VLDL Cholesterol 01/31/2018 25  5 - 40 mg/dL Final   • LDL Cholesterol  01/31/2018 49  0 - 99 mg/dL Final   • Hemoglobin A1C 01/31/2018 7.2* 4.8 - 5.6 % Final    Comment:          Pre-diabetes: 5.7 - 6.4           Diabetes: >6.4           Glycemic control for adults with diabetes: <7.0     • TSH 01/31/2018 0.498  0.450 - 4.500 uIU/mL Final   • Free T4 01/31/2018 1.60  0.82 - 1.77 ng/dL Final   • Creatinine, Urine 01/31/2018 76.8  Not Estab. mg/dL Final   • Microalbumin, Urine 01/31/2018 491.2  Not Estab. ug/mL Final    Comment: Results confirmed on  dilution.     • Microalbumin/Creatinine Ratio 01/31/2018 639.6* 0.0 - 30.0 mg/g creat Final   • Interpretation 01/31/2018 Note   Final    Supplemental report is available.   • PDF Image 01/31/2018 Not applicable   Final     Assessment/Plan   Griffin was seen today for diabetes, hyperlipidemia and erectile dysfunction.    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  Have discussed with patient and wife how these insulin works  Discuss about Dexcom 6 and Medtronic 670 G.  Patient information given.  Continue levothyroxine 100 µg per day.  Brittney Lipitor 20 mg once a day.  Continue Toprol and Imdur per Dr. Kong.  Continue gabapentin.    Send copy of my notes and labs to Dr. Quevedo, and Dr. Kong    RTC 4 mos

## 2018-07-13 DIAGNOSIS — I10 ESSENTIAL HYPERTENSION: Primary | ICD-10-CM

## 2018-07-13 DIAGNOSIS — E78.5 HYPERLIPIDEMIA, UNSPECIFIED HYPERLIPIDEMIA TYPE: ICD-10-CM

## 2018-07-13 DIAGNOSIS — E10.43 TYPE 1 DIABETES MELLITUS WITH AUTONOMIC NEUROPATHY (HCC): ICD-10-CM

## 2018-07-13 DIAGNOSIS — E89.0 POSTOPERATIVE HYPOTHYROIDISM: ICD-10-CM

## 2018-07-14 LAB
ALBUMIN SERPL-MCNC: 4.1 G/DL (ref 3.5–5.2)
ALBUMIN/GLOB SERPL: 1.8 G/DL
ALP SERPL-CCNC: 238 U/L (ref 39–117)
ALT SERPL-CCNC: 15 U/L (ref 1–41)
AST SERPL-CCNC: 13 U/L (ref 1–40)
BILIRUB SERPL-MCNC: 0.6 MG/DL (ref 0.1–1.2)
BUN SERPL-MCNC: 37 MG/DL (ref 8–23)
BUN/CREAT SERPL: 27.2 (ref 7–25)
CALCIUM SERPL-MCNC: 9.3 MG/DL (ref 8.6–10.5)
CHLORIDE SERPL-SCNC: 103 MMOL/L (ref 98–107)
CHOLEST SERPL-MCNC: 121 MG/DL (ref 0–200)
CO2 SERPL-SCNC: 27.8 MMOL/L (ref 22–29)
CREAT SERPL-MCNC: 1.36 MG/DL (ref 0.76–1.27)
GLOBULIN SER CALC-MCNC: 2.3 GM/DL
GLUCOSE SERPL-MCNC: 238 MG/DL (ref 65–99)
HBA1C MFR BLD: 7.7 % (ref 4.8–5.6)
HDLC SERPL-MCNC: 57 MG/DL (ref 40–60)
INTERPRETATION: NORMAL
LDLC SERPL CALC-MCNC: 46 MG/DL (ref 0–100)
Lab: NORMAL
POTASSIUM SERPL-SCNC: 5.2 MMOL/L (ref 3.5–5.2)
PROT SERPL-MCNC: 6.4 G/DL (ref 6–8.5)
SODIUM SERPL-SCNC: 141 MMOL/L (ref 136–145)
T4 FREE SERPL-MCNC: 1.92 NG/DL (ref 0.93–1.7)
TRIGL SERPL-MCNC: 92 MG/DL (ref 0–150)
TSH SERPL DL<=0.005 MIU/L-ACNC: 0.3 MIU/ML (ref 0.27–4.2)
VLDLC SERPL CALC-MCNC: 18.4 MG/DL (ref 5–40)

## 2018-09-06 ENCOUNTER — OFFICE VISIT (OUTPATIENT)
Dept: CARDIOLOGY | Facility: CLINIC | Age: 78
End: 2018-09-06

## 2018-09-06 VITALS
BODY MASS INDEX: 20.88 KG/M2 | WEIGHT: 133 LBS | DIASTOLIC BLOOD PRESSURE: 68 MMHG | HEART RATE: 68 BPM | HEIGHT: 67 IN | SYSTOLIC BLOOD PRESSURE: 116 MMHG

## 2018-09-06 DIAGNOSIS — I10 ESSENTIAL HYPERTENSION: ICD-10-CM

## 2018-09-06 DIAGNOSIS — I25.10 CORONARY ARTERY DISEASE INVOLVING NATIVE CORONARY ARTERY OF NATIVE HEART WITHOUT ANGINA PECTORIS: ICD-10-CM

## 2018-09-06 DIAGNOSIS — R06.02 SOB (SHORTNESS OF BREATH): Primary | ICD-10-CM

## 2018-09-06 DIAGNOSIS — E78.5 HYPERLIPIDEMIA, UNSPECIFIED HYPERLIPIDEMIA TYPE: ICD-10-CM

## 2018-09-06 PROCEDURE — 99213 OFFICE O/P EST LOW 20 MIN: CPT | Performed by: INTERNAL MEDICINE

## 2018-09-06 PROCEDURE — 93000 ELECTROCARDIOGRAM COMPLETE: CPT | Performed by: INTERNAL MEDICINE

## 2018-09-06 RX ORDER — LOSARTAN POTASSIUM AND HYDROCHLOROTHIAZIDE 12.5; 5 MG/1; MG/1
TABLET ORAL
COMMUNITY
Start: 2018-07-30 | End: 2018-11-07 | Stop reason: SDUPTHER

## 2018-09-06 NOTE — PROGRESS NOTES
Subjective:       Griffin Angeles is a 77 y.o. male who here for follow up    CC  The follow-up for the coronary artery disease hypertension hyperlipidemia and shortness of breath  HPI  77 his old male with known history of the coronary artery disease previous angioplasty and a stent.  Hyperlipidemia benign essential arterial hypertension is here for the follow-up complains of shortness of breath on exertion    Griffin Angeles has been complaining of the shortness of breath mild-to-moderate in intensity with mild-to-moderate usually relieved with rest associated with anxiety and fatigue       Problem List Items Addressed This Visit        Cardiovascular and Mediastinum    CAD (coronary artery disease)    Relevant Orders    Stress Test With Myocardial Perfusion One Day    Adult Transthoracic Echo Complete W/ Cont if Necessary Per Protocol (Completed)    Hyperlipidemia    Essential hypertension    Relevant Medications    losartan-hydrochlorothiazide (HYZAAR) 50-12.5 MG per tablet      Other Visit Diagnoses     SOB (shortness of breath)    -  Primary    Relevant Orders    Stress Test With Myocardial Perfusion One Day    Adult Transthoracic Echo Complete W/ Cont if Necessary Per Protocol (Completed)        .    The following portions of the patient's history were reviewed and updated as appropriate: allergies, current medications, past family history, past medical history, past social history, past surgical history and problem list.    Past Medical History:   Diagnosis Date   • BPH (benign prostatic hypertrophy)    • CAD (coronary artery disease)    • Hyperlipidemia    • Hyperparathyroidism (CMS/HCC)    • Hypothyroidism    • Lacunar infarction (CMS/HCC)     Bilateral basal ganglia   • Postherpetic neuralgia    • Type 2 diabetes mellitus (CMS/HCC)     reports that he quit smoking about 32 years ago. His smoking use included Pipe. He does not have any smokeless tobacco history on file. He reports that he drinks alcohol. He  "reports that he does not use drugs.  Family History   Problem Relation Age of Onset   • Diabetes Mother    • Thyroid disease Mother    • Diabetes Father    • Kidney failure Father    • Heart disease Sister        Review of Systems  Constitutional: No wt loss, fever, fatigue  Gastrointestinal: No nausea, abdominal pain  Behavioral/Psych: No insomnia or anxiety   Cardiovascular shortness of breath  Objective:                 Physical Exam  /68 (BP Location: Left arm, Patient Position: Sitting)   Pulse 68   Ht 170.2 cm (67\")   Wt 60.3 kg (133 lb)   BMI 20.83 kg/m²     General appearance: NAD, conversant   Eyes: anicteric sclerae, moist conjunctivae; no lid-lag; PERRLA   HENT: Atraumatic; oropharynx clear with moist mucous membranes and no mucosal ulcerations;  normal hard and soft palate   Neck: Trachea midline; FROM, supple, no thyromegaly or lymphadenopathy   Lungs: CTA, with normal respiratory effort and no intercostal retractions   CV: S1-S2 regular, no murmurs, no rub, no gallop   Abdomen: Soft, non-tender; no masses or HSM   Extremities: No peripheral edema or extremity lymphadenopathy  Skin: Normal temperature, turgor and texture; no rash, ulcers or subcutaneous nodules   Psych: Appropriate affect, alert and oriented to person, place and time           Cardiographics  @  ECG 12 Lead  Date/Time: 9/6/2018 10:53 AM  Performed by: ORLANDO MATOS  Authorized by: ORLANDO MATOS   Comparison: compared with previous ECG   Similar to previous ECG  Rhythm: sinus rhythm  ST Flattening: all  Clinical impression: non-specific ECG            Echocardiogram:        Current Outpatient Prescriptions:   •  ACCU-CHEK SOFTCLIX LANCETS lancets, Testing bs 3 x day Dx code E10.43, Disp: 300 each, Rfl: 1  •  atorvastatin (LIPITOR) 20 MG tablet, Take 1 tablet by mouth Daily., Disp: 90 tablet, Rfl: 1  •  Cholecalciferol (VITAMIN D3) 1000 units capsule, Take 1 capsule by mouth Daily., Disp: , Rfl:   •  clopidogrel " "(PLAVIX) 75 MG tablet, TAKE 1 TABLET EVERY DAY, Disp: 90 tablet, Rfl: 3  •  glucagon (GLUCAGON EMERGENCY) 1 MG injection, Inject 1 mg under the skin 1 (one) time as needed for low blood sugar., Disp: , Rfl:   •  glucose blood (FREESTYLE LITE) test strip, Dx code E10.8 testing bs 4 x day, Disp: 400 each, Rfl: 1  •  insulin NPH (NOVOLIN N RELION) 100 UNIT/ML injection, Inject 10 units in the morning and 8 units at night, Disp: 20 mL, Rfl: 1  •  insulin regular (NOVOLIN R RELION) 100 UNIT/ML injection, Inject  6-8 units in the morning and 4-8 units in the evening, Disp: 20 mL, Rfl: 1  •  Insulin Syringe-Needle U-100 31G X 5/16\" 0.5 ML misc, Use 5 times a day, Disp: 500 each, Rfl: 2  •  isosorbide mononitrate (IMDUR) 30 MG 24 hr tablet, TAKE 1 TABLET EVERY DAY, Disp: 90 tablet, Rfl: 3  •  levothyroxine (SYNTHROID, LEVOTHROID) 100 MCG tablet, TAKE 1 TABLET EVERY MORNING ON AN EMPTY STOMACH 1 HOUR  BEFORE EATING (STOP LEVOTHYROXINE 125MCG), Disp: 90 tablet, Rfl: 1  •  losartan-hydrochlorothiazide (HYZAAR) 50-12.5 MG per tablet, , Disp: , Rfl:   •  metoprolol succinate XL (TOPROL-XL) 50 MG 24 hr tablet, Take 50 mg by mouth Daily. 1/2 tablet daily, Disp: , Rfl:   •  niacin 500 MG tablet, Take 500 mg by mouth As Needed. In winter time, Disp: , Rfl:   •  RELION INSULIN SYRINGE 31G X 15/64\" 0.5 ML misc, USE 1 SYRINGE THREE TIMES DAILY, Disp: 300 each, Rfl: 0  •  tamsulosin (FLOMAX) 0.4 MG capsule 24 hr capsule, 1 capsule Daily. 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)   • Type 1 diabetes mellitus with complications (CMS/[facility])               Plan:            ICD-10-CM ICD-9-CM   1. SOB (shortness of breath) R06.02 786.05   2. Coronary artery disease involving native coronary artery of native heart without angina pectoris I25.10 414.01   3. Hyperlipidemia, unspecified hyperlipidemia type E78.5 272.4   4. Essential hypertension I10 401.9     1. SOB (shortness of breath)  Considering the patient's symptoms as well as clinical situation and  EKG findings, along with cardiac risk factors, ischemic workup is necessary to rule out ischemic cardiomyopathy, stress induced arrhythmias, and functional capacity for diagnosis as well as prognostic consideration    Considering patient's medical condition as well as the risk factors, patient will require echocardiogram for further evaluation for the LV function, four-chamber evaluation, including the pressures, valvular function and  pericardial disease and pericardial effusion    - Stress Test With Myocardial Perfusion One Day  - Adult Transthoracic Echo Complete W/ Cont if Necessary Per Protocol    2. Coronary artery disease involving native coronary artery of native heart without angina pectoris  Griffin Angeles with coronary artery disease has no angina pectoris    Risk reduction for the coronary artery disease, controlling the blood pressure, blood sugar management, cholesterol management, exercise, stress management, and proper compliance with medications and follow-up has been discussed    - Stress Test With Myocardial Perfusion One Day  - Adult Transthoracic Echo Complete W/ Cont if Necessary Per Protocol    3. Hyperlipidemia, unspecified hyperlipidemia type  Risk of the hyperlipidemia, importance of the treatment has been explained    Pros and cons of the antilipemic drugs has been explained    Regular blood  workup as well as side effects including the liver failure, myelopathy death has been explained      4. Essential hypertension  Importance of controlling hypertension and blood pressure  checkup on the regular basis has been explained  Hypertension as a silent killer has been discussed  Risk reduction of the weight and regular exercises to control the hypertension has been explained         Hydrocephalus of brain      Lexiscan, echo    See us 2-4 wks  COUNSELING:    Griffin Eisenberg was given to patient for the following topics: diagnostic results, risk factor reductions, impressions, risks and benefits of treatment options and importance of treatment compliance .       SMOKING COUNSELING:    Counseling given: Yes      EMR Dragon/Transcription disclaimer:   Much of this encounter note is an electronic transcription/translation of spoken language to printed text. The electronic translation of spoken language may permit erroneous, or at times, nonsensical words or phrases to be inadvertently transcribed; Although I have reviewed the note for such errors, some may still exist.

## 2018-09-13 ENCOUNTER — HOSPITAL ENCOUNTER (OUTPATIENT)
Dept: CARDIOLOGY | Facility: HOSPITAL | Age: 78
Discharge: HOME | End: 2018-09-13
Attending: INTERNAL MEDICINE | Admitting: INTERNAL MEDICINE

## 2018-09-13 VITALS — DIASTOLIC BLOOD PRESSURE: 64 MMHG | SYSTOLIC BLOOD PRESSURE: 116 MMHG

## 2018-09-13 PROCEDURE — 93306 TTE W/DOPPLER COMPLETE: CPT

## 2018-09-13 PROCEDURE — 93306 TTE W/DOPPLER COMPLETE: CPT | Performed by: INTERNAL MEDICINE

## 2018-09-15 LAB
BH CV ECHO MEAS - ACS: 1.8 CM
BH CV ECHO MEAS - AO MAX PG (FULL): 7.6 MMHG
BH CV ECHO MEAS - AO MAX PG: 11.4 MMHG
BH CV ECHO MEAS - AO MEAN PG (FULL): 3 MMHG
BH CV ECHO MEAS - AO MEAN PG: 5 MMHG
BH CV ECHO MEAS - AO ROOT AREA (BSA CORRECTED): 1.6
BH CV ECHO MEAS - AO ROOT AREA: 5.7 CM^2
BH CV ECHO MEAS - AO ROOT DIAM: 2.7 CM
BH CV ECHO MEAS - AO V2 MAX: 169 CM/SEC
BH CV ECHO MEAS - AO V2 MEAN: 102 CM/SEC
BH CV ECHO MEAS - AO V2 VTI: 42.8 CM
BH CV ECHO MEAS - AVA(I,A): 1.8 CM^2
BH CV ECHO MEAS - AVA(I,D): 1.8 CM^2
BH CV ECHO MEAS - AVA(V,A): 1.8 CM^2
BH CV ECHO MEAS - AVA(V,D): 1.8 CM^2
BH CV ECHO MEAS - BSA(HAYCOCK): 1.7 M^2
BH CV ECHO MEAS - BSA: 1.7 M^2
BH CV ECHO MEAS - BZI_BMI: 20.8 KILOGRAMS/M^2
BH CV ECHO MEAS - BZI_METRIC_HEIGHT: 170.2 CM
BH CV ECHO MEAS - BZI_METRIC_WEIGHT: 60.3 KG
BH CV ECHO MEAS - EDV(CUBED): 50.7 ML
BH CV ECHO MEAS - EDV(MOD-SP2): 58 ML
BH CV ECHO MEAS - EDV(MOD-SP4): 72 ML
BH CV ECHO MEAS - EDV(TEICH): 58.1 ML
BH CV ECHO MEAS - EF(CUBED): 72.7 %
BH CV ECHO MEAS - EF(MOD-BP): 60 %
BH CV ECHO MEAS - EF(MOD-SP2): 48.3 %
BH CV ECHO MEAS - EF(MOD-SP4): 55.6 %
BH CV ECHO MEAS - EF(TEICH): 65.3 %
BH CV ECHO MEAS - ESV(CUBED): 13.8 ML
BH CV ECHO MEAS - ESV(MOD-SP2): 30 ML
BH CV ECHO MEAS - ESV(MOD-SP4): 32 ML
BH CV ECHO MEAS - ESV(TEICH): 20.2 ML
BH CV ECHO MEAS - FS: 35.1 %
BH CV ECHO MEAS - IVS/LVPW: 1.1
BH CV ECHO MEAS - IVSD: 1 CM
BH CV ECHO MEAS - LAT PEAK E' VEL: 9 CM/SEC
BH CV ECHO MEAS - LV DIASTOLIC VOL/BSA (35-75): 42.3 ML/M^2
BH CV ECHO MEAS - LV MASS(C)D: 108.5 GRAMS
BH CV ECHO MEAS - LV MASS(C)DI: 63.8 GRAMS/M^2
BH CV ECHO MEAS - LV MAX PG: 3.8 MMHG
BH CV ECHO MEAS - LV MEAN PG: 2 MMHG
BH CV ECHO MEAS - LV SYSTOLIC VOL/BSA (12-30): 18.8 ML/M^2
BH CV ECHO MEAS - LV V1 MAX: 97.9 CM/SEC
BH CV ECHO MEAS - LV V1 MEAN: 56.1 CM/SEC
BH CV ECHO MEAS - LV V1 VTI: 25.2 CM
BH CV ECHO MEAS - LVIDD: 3.7 CM
BH CV ECHO MEAS - LVIDS: 2.4 CM
BH CV ECHO MEAS - LVLD AP2: 7.3 CM
BH CV ECHO MEAS - LVLD AP4: 7.3 CM
BH CV ECHO MEAS - LVLS AP2: 6.5 CM
BH CV ECHO MEAS - LVLS AP4: 5.8 CM
BH CV ECHO MEAS - LVOT AREA (M): 3.1 CM^2
BH CV ECHO MEAS - LVOT AREA: 3.1 CM^2
BH CV ECHO MEAS - LVOT DIAM: 2 CM
BH CV ECHO MEAS - LVPWD: 0.95 CM
BH CV ECHO MEAS - MED PEAK E' VEL: 7 CM/SEC
BH CV ECHO MEAS - MV A DUR: 0.14 SEC
BH CV ECHO MEAS - MV A MAX VEL: 102 CM/SEC
BH CV ECHO MEAS - MV DEC SLOPE: 528 CM/SEC^2
BH CV ECHO MEAS - MV DEC TIME: 0.22 SEC
BH CV ECHO MEAS - MV E MAX VEL: 94.3 CM/SEC
BH CV ECHO MEAS - MV E/A: 0.92
BH CV ECHO MEAS - MV MAX PG: 5.6 MMHG
BH CV ECHO MEAS - MV MEAN PG: 1 MMHG
BH CV ECHO MEAS - MV P1/2T MAX VEL: 109 CM/SEC
BH CV ECHO MEAS - MV P1/2T: 60.5 MSEC
BH CV ECHO MEAS - MV V2 MAX: 118 CM/SEC
BH CV ECHO MEAS - MV V2 MEAN: 50.4 CM/SEC
BH CV ECHO MEAS - MV V2 VTI: 40 CM
BH CV ECHO MEAS - MVA P1/2T LCG: 2 CM^2
BH CV ECHO MEAS - MVA(P1/2T): 3.6 CM^2
BH CV ECHO MEAS - MVA(VTI): 2 CM^2
BH CV ECHO MEAS - PA ACC TIME: 0.12 SEC
BH CV ECHO MEAS - PA MAX PG (FULL): 0.42 MMHG
BH CV ECHO MEAS - PA MAX PG: 2 MMHG
BH CV ECHO MEAS - PA PR(ACCEL): 23.2 MMHG
BH CV ECHO MEAS - PA V2 MAX: 70 CM/SEC
BH CV ECHO MEAS - PULM A REVS DUR: 0.12 SEC
BH CV ECHO MEAS - PULM A REVS VEL: 24.1 CM/SEC
BH CV ECHO MEAS - PULM DIAS VEL: 42 CM/SEC
BH CV ECHO MEAS - PULM S/D: 1.8
BH CV ECHO MEAS - PULM SYS VEL: 74 CM/SEC
BH CV ECHO MEAS - PVA(V,A): 3.7 CM^2
BH CV ECHO MEAS - PVA(V,D): 3.7 CM^2
BH CV ECHO MEAS - QP/QS: 0.86
BH CV ECHO MEAS - RAP SYSTOLE: 8 MMHG
BH CV ECHO MEAS - RV MAX PG: 1.5 MMHG
BH CV ECHO MEAS - RV MEAN PG: 1 MMHG
BH CV ECHO MEAS - RV V1 MAX: 62.1 CM/SEC
BH CV ECHO MEAS - RV V1 MEAN: 41.7 CM/SEC
BH CV ECHO MEAS - RV V1 VTI: 16.3 CM
BH CV ECHO MEAS - RVOT AREA: 4.2 CM^2
BH CV ECHO MEAS - RVOT DIAM: 2.3 CM
BH CV ECHO MEAS - SI(AO): 144.1 ML/M^2
BH CV ECHO MEAS - SI(CUBED): 21.7 ML/M^2
BH CV ECHO MEAS - SI(LVOT): 46.6 ML/M^2
BH CV ECHO MEAS - SI(MOD-SP2): 16.5 ML/M^2
BH CV ECHO MEAS - SI(MOD-SP4): 23.5 ML/M^2
BH CV ECHO MEAS - SI(TEICH): 22.3 ML/M^2
BH CV ECHO MEAS - SV(AO): 245.1 ML
BH CV ECHO MEAS - SV(CUBED): 36.8 ML
BH CV ECHO MEAS - SV(LVOT): 79.2 ML
BH CV ECHO MEAS - SV(MOD-SP2): 28 ML
BH CV ECHO MEAS - SV(MOD-SP4): 40 ML
BH CV ECHO MEAS - SV(RVOT): 67.7 ML
BH CV ECHO MEAS - SV(TEICH): 38 ML
BH CV ECHO MEAS - TAPSE (>1.6): 2 CM2
BH CV ECHO MEASUREMENTS AVERAGE E/E' RATIO: 11.79
BH CV VAS BP RIGHT ARM: NORMAL MMHG
BH CV XLRA - RV BASE: 2.2 CM
BH CV XLRA - TDI S': 13 CM/SEC
LEFT ATRIUM VOLUME INDEX: 18 ML/M2
MAXIMAL PREDICTED HEART RATE: 144 BPM
STRESS TARGET HR: 122 BPM

## 2018-09-17 RX ORDER — LANCETS
EACH MISCELLANEOUS
Qty: 300 EACH | Refills: 1 | Status: SHIPPED | OUTPATIENT
Start: 2018-09-17 | End: 2018-09-18 | Stop reason: SDUPTHER

## 2018-09-18 ENCOUNTER — HOSPITAL ENCOUNTER (OUTPATIENT)
Dept: CARDIOLOGY | Facility: HOSPITAL | Age: 78
Discharge: HOME | End: 2018-09-18
Attending: INTERNAL MEDICINE

## 2018-09-18 VITALS
HEIGHT: 67 IN | HEART RATE: 66 BPM | BODY MASS INDEX: 20.88 KG/M2 | SYSTOLIC BLOOD PRESSURE: 100 MMHG | RESPIRATION RATE: 20 BRPM | OXYGEN SATURATION: 99 % | WEIGHT: 133 LBS | DIASTOLIC BLOOD PRESSURE: 64 MMHG

## 2018-09-18 PROCEDURE — 93018 CV STRESS TEST I&R ONLY: CPT | Performed by: INTERNAL MEDICINE

## 2018-09-18 PROCEDURE — 0 TECHNETIUM SESTAMIBI: Performed by: INTERNAL MEDICINE

## 2018-09-18 PROCEDURE — 93016 CV STRESS TEST SUPVJ ONLY: CPT | Performed by: INTERNAL MEDICINE

## 2018-09-18 PROCEDURE — 25010000002 REGADENOSON 0.4 MG/5ML SOLUTION: Performed by: INTERNAL MEDICINE

## 2018-09-18 PROCEDURE — 78452 HT MUSCLE IMAGE SPECT MULT: CPT | Performed by: INTERNAL MEDICINE

## 2018-09-18 PROCEDURE — 78452 HT MUSCLE IMAGE SPECT MULT: CPT

## 2018-09-18 PROCEDURE — A9500 TC99M SESTAMIBI: HCPCS | Performed by: INTERNAL MEDICINE

## 2018-09-18 PROCEDURE — 93017 CV STRESS TEST TRACING ONLY: CPT

## 2018-09-18 RX ORDER — LANCETS
EACH MISCELLANEOUS
Qty: 300 EACH | Refills: 1 | Status: SHIPPED | OUTPATIENT
Start: 2018-09-18 | End: 2018-09-20 | Stop reason: SDUPTHER

## 2018-09-18 RX ADMIN — TECHNETIUM TC 99M SESTAMIBI 1 DOSE: 1 INJECTION INTRAVENOUS at 11:02

## 2018-09-18 RX ADMIN — TECHNETIUM TC 99M SESTAMIBI 1 DOSE: 1 INJECTION INTRAVENOUS at 08:22

## 2018-09-18 RX ADMIN — REGADENOSON 0.4 MG: 0.08 INJECTION, SOLUTION INTRAVENOUS at 11:02

## 2018-09-19 LAB
BH CV STRESS BP STAGE 2: NORMAL
BH CV STRESS BP STAGE 3: NORMAL
BH CV STRESS BP STAGE 4: NORMAL
BH CV STRESS DURATION MIN STAGE 1: 1
BH CV STRESS DURATION MIN STAGE 2: 2
BH CV STRESS DURATION MIN STAGE 3: 2
BH CV STRESS DURATION MIN STAGE 4: 2
BH CV STRESS DURATION SEC STAGE 1: 0
BH CV STRESS DURATION SEC STAGE 2: 30
BH CV STRESS DURATION SEC STAGE 3: 30
BH CV STRESS DURATION SEC STAGE 4: 10
BH CV STRESS GRADE STAGE 1: 0
BH CV STRESS GRADE STAGE 2: 5
BH CV STRESS GRADE STAGE 3: 10
BH CV STRESS GRADE STAGE 4: 12
BH CV STRESS HR STAGE 1: 84
BH CV STRESS HR STAGE 2: 95
BH CV STRESS HR STAGE 3: 103
BH CV STRESS HR STAGE 4: 110
BH CV STRESS METS STAGE 1: 1.3
BH CV STRESS METS STAGE 2: 3.4
BH CV STRESS METS STAGE 3: 4.6
BH CV STRESS METS STAGE 4: 6.5
BH CV STRESS PROTOCOL 1: NORMAL
BH CV STRESS PROTOCOL 2 BP STAGE 1: NORMAL
BH CV STRESS PROTOCOL 2 COMMENTS STAGE 1: NORMAL
BH CV STRESS PROTOCOL 2 DOSE REGADENOSON STAGE 1: 0.4
BH CV STRESS PROTOCOL 2 DURATION MIN STAGE 1: 1
BH CV STRESS PROTOCOL 2 DURATION SEC STAGE 1: 23
BH CV STRESS PROTOCOL 2 HR STAGE 1: 117
BH CV STRESS PROTOCOL 2 STAGE 1: 1
BH CV STRESS PROTOCOL 2: NORMAL
BH CV STRESS RECOVERY BP: NORMAL MMHG
BH CV STRESS RECOVERY HR: 91 BPM
BH CV STRESS RECOVERY O2: 98 %
BH CV STRESS SPEED STAGE 1: 1.1
BH CV STRESS SPEED STAGE 2: 1.2
BH CV STRESS SPEED STAGE 3: 1.7
BH CV STRESS SPEED STAGE 4: 2.3
BH CV STRESS STAGE 1: 0
BH CV STRESS STAGE 2: NORMAL
BH CV STRESS STAGE 3: 1
BH CV STRESS STAGE 4: 4
LV EF NUC BP: 60 %
MAXIMAL PREDICTED HEART RATE: 144 BPM
PERCENT MAX PREDICTED HR: 81.25 %
STRESS BASELINE BP: NORMAL MMHG
STRESS BASELINE HR: 67 BPM
STRESS O2 SAT REST: 99 %
STRESS PERCENT HR: 96 %
STRESS POST ESTIMATED WORKLOAD: 6.5 METS
STRESS POST EXERCISE DUR MIN: 9 MIN
STRESS POST EXERCISE DUR SEC: 53 SEC
STRESS POST PEAK BP: NORMAL MMHG
STRESS POST PEAK HR: 117 BPM
STRESS TARGET HR: 122 BPM

## 2018-09-20 RX ORDER — LANCETS
EACH MISCELLANEOUS
Qty: 300 EACH | Refills: 1 | Status: SHIPPED | OUTPATIENT
Start: 2018-09-20 | End: 2018-09-21 | Stop reason: SDUPTHER

## 2018-09-21 RX ORDER — LANCETS
EACH MISCELLANEOUS
Qty: 300 EACH | Refills: 1 | Status: SHIPPED | OUTPATIENT
Start: 2018-09-21

## 2018-09-25 ENCOUNTER — OFFICE VISIT (OUTPATIENT)
Dept: CARDIOLOGY | Facility: CLINIC | Age: 78
End: 2018-09-25

## 2018-09-25 VITALS
SYSTOLIC BLOOD PRESSURE: 118 MMHG | WEIGHT: 135 LBS | HEART RATE: 75 BPM | HEIGHT: 67 IN | DIASTOLIC BLOOD PRESSURE: 68 MMHG | BODY MASS INDEX: 21.19 KG/M2

## 2018-09-25 DIAGNOSIS — I10 ESSENTIAL HYPERTENSION: ICD-10-CM

## 2018-09-25 DIAGNOSIS — I25.10 CORONARY ARTERY DISEASE INVOLVING NATIVE CORONARY ARTERY OF NATIVE HEART WITHOUT ANGINA PECTORIS: ICD-10-CM

## 2018-09-25 DIAGNOSIS — E78.5 HYPERLIPIDEMIA, UNSPECIFIED HYPERLIPIDEMIA TYPE: Primary | ICD-10-CM

## 2018-09-25 PROCEDURE — 99212 OFFICE O/P EST SF 10 MIN: CPT | Performed by: INTERNAL MEDICINE

## 2018-11-07 ENCOUNTER — OFFICE VISIT (OUTPATIENT)
Dept: ENDOCRINOLOGY | Age: 78
End: 2018-11-07

## 2018-11-07 VITALS
WEIGHT: 136.4 LBS | HEART RATE: 64 BPM | SYSTOLIC BLOOD PRESSURE: 124 MMHG | BODY MASS INDEX: 21.41 KG/M2 | DIASTOLIC BLOOD PRESSURE: 78 MMHG | HEIGHT: 67 IN | OXYGEN SATURATION: 98 %

## 2018-11-07 DIAGNOSIS — E78.5 HYPERLIPIDEMIA, UNSPECIFIED HYPERLIPIDEMIA TYPE: ICD-10-CM

## 2018-11-07 DIAGNOSIS — E10.29 TYPE 1 DIABETES MELLITUS WITH PROTEINURIA (HCC): ICD-10-CM

## 2018-11-07 DIAGNOSIS — E10.649 HYPOGLYCEMIC UNAWARENESS ASSOCIATED WITH TYPE 1 DIABETES MELLITUS: ICD-10-CM

## 2018-11-07 DIAGNOSIS — E10.43 TYPE 1 DIABETES MELLITUS WITH AUTONOMIC NEUROPATHY (HCC): ICD-10-CM

## 2018-11-07 DIAGNOSIS — I25.10 CORONARY ARTERY DISEASE INVOLVING NATIVE CORONARY ARTERY OF NATIVE HEART WITHOUT ANGINA PECTORIS: ICD-10-CM

## 2018-11-07 DIAGNOSIS — E89.0 POSTOPERATIVE HYPOTHYROIDISM: ICD-10-CM

## 2018-11-07 DIAGNOSIS — E10.8 TYPE 1 DIABETES MELLITUS WITH COMPLICATIONS (HCC): Primary | ICD-10-CM

## 2018-11-07 DIAGNOSIS — R80.9 TYPE 1 DIABETES MELLITUS WITH PROTEINURIA (HCC): ICD-10-CM

## 2018-11-07 PROCEDURE — 99214 OFFICE O/P EST MOD 30 MIN: CPT | Performed by: INTERNAL MEDICINE

## 2018-11-07 PROCEDURE — G0008 ADMIN INFLUENZA VIRUS VAC: HCPCS | Performed by: INTERNAL MEDICINE

## 2018-11-07 PROCEDURE — 90662 IIV NO PRSV INCREASED AG IM: CPT | Performed by: INTERNAL MEDICINE

## 2018-11-07 RX ORDER — MULTIPLE VITAMINS W/ MINERALS TAB 9MG-400MCG
1 TAB ORAL DAILY
COMMUNITY
End: 2020-07-02

## 2018-11-07 RX ORDER — LOSARTAN POTASSIUM AND HYDROCHLOROTHIAZIDE 12.5; 5 MG/1; MG/1
TABLET ORAL
COMMUNITY
Start: 2018-10-24 | End: 2019-12-10 | Stop reason: SDUPTHER

## 2018-11-07 NOTE — PROGRESS NOTES
Subjective   Griffin Angeles is a 77 y.o. male.     F/u for dm 1,hyperlipidemia , ED / testing bs 4 x day / last dm eye exam 6/29/17 with dr Acevedo / last dm foot exam 1/31/18 with dr Silver       Diabetes   He presents for his follow-up diabetic visit. He has type 1 diabetes mellitus. Hypoglycemia symptoms include confusion.   Hyperlipidemia     Erectile Dysfunction        Patient is a 77-year-old male who came in for diabetes control. He has known diabetes mellitus for 30 years. He has been on Novolin N 5 units every morning and 6 q hs and Novolin R 5 units every morning and 5 units before supper.  He eats a snack between lunch and supper. He checks his blood sugar 2-3 a day.  He has been taking extra Novolin N at supper time.  He checks his blood sugar 4 times per day.  Fasting blood sugar runs between . Suppertime blood sugar runs between .  He has few hypoglycemic episodes but did not require glucagon injection.  His last meal was 9 AM      His last eye examination was in 6/18 with Dr. Benoit. He has no diabetic retinopathy but has macular degeneration on left eye. He had left cataract surgery in August 2017.  He denies numbness, tingling or burning on his hands. He has albuminuria on urine sample taken in 1/18. He is on losartan HCT       He had previous thyroidectomy for unknown thyroid disease. He has been on levothyroxine 100 mcg per day. He has lost 3 pounds since June 2018      He has known coronary disease and had previous angioplasty with stent and history of hypertension. He has occasional chest pain but has not used NTG. He had a normal stress test in done in 9/18 by Dr. Kong. He is on Toprol and Imdur.  He denies chest pain or SOB.      He has hyperlipidemia and has been on Lipitor 20 mg once a day. He denies any myalgia.     He was seen for gait abnormality last September 2017 by Dr. Mcdonald.  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  He has been seen by neurologist at the [facility] and patient is part of the study.  He is not on medications.  There is no associated urinary incontinence or headaches.  He has difficulty with short-term memory.  He has not been taking aspirin.  They do not remember whether he had a carotid ultrasound.  He is seeing neurologist Dr. Darren Huang.  He is not on any medication.      He has erectile dysfunction and has been seeing Dr. Love. He had penile implant in 8/16.  He was started on Flomax prn for urinary retention.    He was recently treated for a sinus infection by Dr. Quevedo     The following portions of the patient's history were reviewed and updated as appropriate: allergies, current medications, past family history, past medical history, past social history, past surgical history and problem list.    Review of Systems   Constitutional: Negative.    HENT: Negative.    Eyes: Negative.    Respiratory: Negative.    Cardiovascular: Negative.    Gastrointestinal: Negative.    Endocrine: Negative.    Genitourinary: Negative.    Musculoskeletal: Positive for back pain.   Skin: Negative.    Allergic/Immunologic: Negative.    Neurological: Negative.    Hematological: Bruises/bleeds easily.   Psychiatric/Behavioral: Positive for agitation and confusion.       Objective      Vitals:    11/07/18 1301   BP: 124/78   BP Location: Right arm   Patient Position: Sitting   Cuff Size: Small Adult   Pulse: 64   SpO2: 98%   Weight: 61.9 kg (136 lb 6.4 oz)   Height: 170.2 cm (67.01\")     Physical Exam   Constitutional: He appears well-developed and well-nourished. No distress.   HENT:   Head: Normocephalic.   Nose: Nose normal.   Mouth/Throat: No oropharyngeal exudate.   Eyes: Conjunctivae and EOM are normal. Right eye exhibits no discharge. Left eye exhibits no discharge. No scleral icterus.   Neck: Normal range of motion. No JVD present. No tracheal deviation present. No thyromegaly " present.   Cardiovascular: Normal rate, regular rhythm, normal heart sounds and intact distal pulses.  Exam reveals no gallop and no friction rub.    No murmur heard.  Pulmonary/Chest: Effort normal and breath sounds normal. No respiratory distress. He has no wheezes. He has no rales. He exhibits no tenderness.   Abdominal: Soft. Bowel sounds are normal. He exhibits no distension and no mass. There is no tenderness. There is no guarding.   Musculoskeletal: Normal range of motion. He exhibits no edema, tenderness or deformity.   Intact light touch in distal lower ext   Lymphadenopathy:     He has no cervical adenopathy.   Neurological: He displays normal reflexes. No cranial nerve deficit. Coordination normal.   Skin: Skin is warm and dry. No rash noted. No erythema.   Psychiatric: He has a normal mood and affect. His behavior is normal.     Office Visit on 09/06/2018   Component Date Value Ref Range Status   • Target HR (85%) 09/18/2018 122  bpm Final   • Max. Pred. HR (100%) 09/18/2018 144  bpm Final   • BH CV STRESS PROTOCOL 1 09/18/2018 Modified Jerel   Final   • Stage 1 09/18/2018 0   Final   • HR Stage 1 09/18/2018 84   Final   • Duration Min Stage 1 09/18/2018 1   Final   • Duration Sec Stage 1 09/18/2018 0   Final   • Grade Stage 1 09/18/2018 0   Final   • Speed Stage 1 09/18/2018 1.1   Final   • BH CV STRESS METS STAGE 1 09/18/2018 1.3   Final   • Stage 2 09/18/2018 1/2   Final   • HR Stage 2 09/18/2018 95   Final   • BP Stage 2 09/18/2018 104/64   Final   • Duration Min Stage 2 09/18/2018 2   Final   • Duration Sec Stage 2 09/18/2018 30   Final   • Grade Stage 2 09/18/2018 5   Final   • Speed Stage 2 09/18/2018 1.2   Final   • BH CV STRESS METS STAGE 2 09/18/2018 3.4   Final   • Stage 3 09/18/2018 1   Final   • HR Stage 3 09/18/2018 103   Final   • BP Stage 3 09/18/2018 144/80   Final   • Duration Min Stage 3 09/18/2018 2   Final   • Duration Sec Stage 3 09/18/2018 30   Final   • Grade Stage 3 09/18/2018 10    Final   • Speed Stage 3 09/18/2018 1.7   Final   • BH CV STRESS METS STAGE 3 09/18/2018 4.6   Final   • Stage 4 09/18/2018 4   Final   • HR Stage 4 09/18/2018 110   Final   • BP Stage 4 09/18/2018 144/80   Final   • Duration Min Stage 4 09/18/2018 2   Final   • Duration Sec Stage 4 09/18/2018 10   Final   • Grade Stage 4 09/18/2018 12   Final   • Speed Stage 4 09/18/2018 2.3   Final   • BH CV STRESS METS STAGE 4 09/18/2018 6.5   Final   • Baseline HR 09/18/2018 67  bpm Final   • Baseline BP 09/18/2018 100/64  mmHg Final   • O2 sat rest 09/18/2018 99  % Final   • Peak HR 09/18/2018 117  bpm Final   • Percent Max Pred HR 09/18/2018 81.25  % Final   • Percent Target HR 09/18/2018 96  % Final   • Peak BP 09/18/2018 140/70  mmHg Final   • Recovery HR 09/18/2018 91  bpm Final   • Recovery BP 09/18/2018 144/66  mmHg Final   • Recovery O2 09/18/2018 98  % Final   • Exercise duration (min) 09/18/2018 9  min Final   • Exercise duration (sec) 09/18/2018 53  sec Final   • Estimated workload 09/18/2018 6.5  METS Final   • BH CV STRESS PROTOCOL 2 09/18/2018 Pharmacologic   Final   • BH CV STRESS PROTOCOL 2 STAGE 1 09/18/2018 1   Final   • BH CV STRESS PROTOCOL 2 HR STAGE 1 09/18/2018 117   Final   • BH CV STRESS PROTOCOL 2 BP STAGE 1 09/18/2018 140/70   Final   • BH CV STRESS PROTOCOL 2 DURATION M* 09/18/2018 1   Final   • BH CV STRESS PROTOCOL 2 DURATION S* 09/18/2018 23   Final   • BH CV STRESS PROTOCOL 2 DOSE REGAD* 09/18/2018 0.4   Final   • BH CV STRESS PROTOCOL 2 COMMENTS S* 09/18/2018 10 sec bolus injection   Final   • Nuc Stress EF 09/18/2018 60  % Final   • BSA 09/13/2018 1.7  m^2 Final   • IVSd 09/13/2018 1.0  cm Final   • LVIDd 09/13/2018 3.7  cm Final   • LVIDs 09/13/2018 2.4  cm Final   • LVPWd 09/13/2018 0.95  cm Final   • IVS/LVPW 09/13/2018 1.1   Final   • FS 09/13/2018 35.1  % Final   • EDV(Teich) 09/13/2018 58.1  ml Final   • ESV(Teich) 09/13/2018 20.2  ml Final   • EF(Teich) 09/13/2018 65.3  % Final   •  EDV(cubed) 09/13/2018 50.7  ml Final   • ESV(cubed) 09/13/2018 13.8  ml Final   • EF(cubed) 09/13/2018 72.7  % Final   • LV mass(C)d 09/13/2018 108.5  grams Final   • LV mass(C)dI 09/13/2018 63.8  grams/m^2 Final   • SV(Teich) 09/13/2018 38.0  ml Final   • SI(Teich) 09/13/2018 22.3  ml/m^2 Final   • SV(cubed) 09/13/2018 36.8  ml Final   • SI(cubed) 09/13/2018 21.7  ml/m^2 Final   • Ao root diam 09/13/2018 2.7  cm Final   • Ao root area 09/13/2018 5.7  cm^2 Final   • ACS 09/13/2018 1.8  cm Final   • LVOT diam 09/13/2018 2.0  cm Final   • LVOT area 09/13/2018 3.1  cm^2 Final   • LVOT area(traced) 09/13/2018 3.1  cm^2 Final   • RVOT diam 09/13/2018 2.3  cm Final   • RVOT area 09/13/2018 4.2  cm^2 Final   • LVLd ap4 09/13/2018 7.3  cm Final   • EDV(MOD-sp4) 09/13/2018 72.0  ml Final   • LVLs ap4 09/13/2018 5.8  cm Final   • ESV(MOD-sp4) 09/13/2018 32.0  ml Final   • EF(MOD-sp4) 09/13/2018 55.6  % Final   • LVLd ap2 09/13/2018 7.3  cm Final   • EDV(MOD-sp2) 09/13/2018 58.0  ml Final   • LVLs ap2 09/13/2018 6.5  cm Final   • ESV(MOD-sp2) 09/13/2018 30.0  ml Final   • EF(MOD-sp2) 09/13/2018 48.3  % Final   • SV(MOD-sp4) 09/13/2018 40.0  ml Final   • SI(MOD-sp4) 09/13/2018 23.5  ml/m^2 Final   • SV(MOD-sp2) 09/13/2018 28.0  ml Final   • SI(MOD-sp2) 09/13/2018 16.5  ml/m^2 Final   • Ao root area (BSA corrected) 09/13/2018 1.6   Final   • LV Parker Vol (BSA corrected) 09/13/2018 42.3  ml/m^2 Final   • LV Sys Vol (BSA corrected) 09/13/2018 18.8  ml/m^2 Final   • MV A dur 09/13/2018 0.14  sec Final   • MV E max mela 09/13/2018 94.3  cm/sec Final   • MV A max mela 09/13/2018 102.0  cm/sec Final   • MV E/A 09/13/2018 0.92   Final   • MV V2 max 09/13/2018 118.0  cm/sec Final   • MV max PG 09/13/2018 5.6  mmHg Final   • MV V2 mean 09/13/2018 50.4  cm/sec Final   • MV mean PG 09/13/2018 1.0  mmHg Final   • MV V2 VTI 09/13/2018 40.0  cm Final   • MVA(VTI) 09/13/2018 2.0  cm^2 Final   • MV P1/2t max mela 09/13/2018 109.0  cm/sec Final   •  MV P1/2t 09/13/2018 60.5  msec Final   • MVA(P1/2t) 09/13/2018 3.6  cm^2 Final   • MV dec slope 09/13/2018 528.0  cm/sec^2 Final   • MV dec time 09/13/2018 0.22  sec Final   • Ao pk hugo 09/13/2018 169.0  cm/sec Final   • Ao max PG 09/13/2018 11.4  mmHg Final   • Ao max PG (full) 09/13/2018 7.6  mmHg Final   • Ao V2 mean 09/13/2018 102.0  cm/sec Final   • Ao mean PG 09/13/2018 5.0  mmHg Final   • Ao mean PG (full) 09/13/2018 3.0  mmHg Final   • Ao V2 VTI 09/13/2018 42.8  cm Final   • TIMOTEO(I,A) 09/13/2018 1.8  cm^2 Final   • TIMOTEO(I,D) 09/13/2018 1.8  cm^2 Final   • TIMOTEO(V,A) 09/13/2018 1.8  cm^2 Final   • TIMOTEO(V,D) 09/13/2018 1.8  cm^2 Final   • LV V1 max PG 09/13/2018 3.8  mmHg Final   • LV V1 mean PG 09/13/2018 2.0  mmHg Final   • LV V1 max 09/13/2018 97.9  cm/sec Final   • LV V1 mean 09/13/2018 56.1  cm/sec Final   • LV V1 VTI 09/13/2018 25.2  cm Final   • SV(Ao) 09/13/2018 245.1  ml Final   • SI(Ao) 09/13/2018 144.1  ml/m^2 Final   • SV(LVOT) 09/13/2018 79.2  ml Final   • SV(RVOT) 09/13/2018 67.7  ml Final   • SI(LVOT) 09/13/2018 46.6  ml/m^2 Final   • PA V2 max 09/13/2018 70.0  cm/sec Final   • PA max PG 09/13/2018 2.0  mmHg Final   • PA max PG (full) 09/13/2018 0.42  mmHg Final   • BH CV ECHO NIRANJAN - PVA(V,A) 09/13/2018 3.7  cm^2 Final   • BH CV ECHO NIRANJAN - PVA(V,D) 09/13/2018 3.7  cm^2 Final   • PA acc time 09/13/2018 0.12  sec Final   • RV V1 max PG 09/13/2018 1.5  mmHg Final   • RV V1 mean PG 09/13/2018 1.0  mmHg Final   • RV V1 max 09/13/2018 62.1  cm/sec Final   • RV V1 mean 09/13/2018 41.7  cm/sec Final   • RV V1 VTI 09/13/2018 16.3  cm Final   • RAP systole 09/13/2018 8.0  mmHg Final   • PA pr(Accel) 09/13/2018 23.2  mmHg Final   • Pulm Sys Hugo 09/13/2018 74.0  cm/sec Final   • Pulm Parker Hugo 09/13/2018 42.0  cm/sec Final   • Pulm S/D 09/13/2018 1.8   Final   • Qp/Qs 09/13/2018 0.86   Final   • Pulm A Revs Dur 09/13/2018 0.12  sec Final   • Pulm A Revs Hugo 09/13/2018 24.1  cm/sec Final   • MVA P1/2T LCG  09/13/2018 2.0  cm^2 Final   • BH CV ECHO NIRANJAN - BZI_BMI 09/13/2018 20.8  kilograms/m^2 Final   • BH CV ECHO NIRANJAN - BSA(HAYCOCK) 09/13/2018 1.7  m^2 Final   • BH CV ECHO NIRANJAN - BZI_METRIC_WEIGHT 09/13/2018 60.3  kg Final   • BH CV ECHO NIRANJAN - BZI_METRIC_HEIGHT 09/13/2018 170.2  cm Final   • Target HR (85%) 09/13/2018 122  bpm Final   • Max. Pred. HR (100%) 09/13/2018 144  bpm Final   • BH CV VAS BP RIGHT ARM 09/13/2018 115/64  mmHg Final   • TDI S' 09/13/2018 13.00  cm/sec Final   • RV Base 09/13/2018 2.20  cm Final   • LA Volume Index 09/13/2018 18.0  mL/m2 Final   • Avg E/e' ratio 09/13/2018 11.79   Final   • Lat Peak E' Hugo 09/13/2018 9.0  cm/sec Final   • Med Peak E' Hugo 09/13/2018 7.00  cm/sec Final   • TAPSE (>1.6) 09/13/2018 2.00  cm2 Final   • EF(MOD-bp) 09/13/2018 60  % Final     Assessment/Plan   Griffin was seen today for diabetes, hyperlipidemia and erectile dysfunction.    Diagnoses and all orders for this visit:    Type 1 diabetes mellitus with complications (CMS/[facility])  -     glucagon (GLUCAGON EMERGENCY) 1 MG injection; Inject 1 mg under the skin into the appropriate area as directed 1 (One) Time As Needed for Low Blood Sugar.  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  Prescription for Freestyle nitza was given to the patient.  Continue Novolin N and Novolin R.  Discuss about analog insulins.  Continue losartan HCT, isosorbide mononitrate, and Toprol-XL  Continue Lipitor 20 mg per day.  Continue levothyroxine 100 µg per day.  Flu vaccine today.    Send copy of my notes and labs to Dr. Quevedo, Dr. Kong, Dr. Benoit, Dr. Acevedo, and Dr. Darren Huang.    RTC 4 mos

## 2018-11-08 LAB
ALBUMIN SERPL-MCNC: 3.6 G/DL (ref 3.5–5.2)
ALBUMIN/GLOB SERPL: 1.3 G/DL
ALP SERPL-CCNC: 222 U/L (ref 39–117)
ALT SERPL-CCNC: 12 U/L (ref 1–41)
AST SERPL-CCNC: 13 U/L (ref 1–40)
BILIRUB SERPL-MCNC: 0.5 MG/DL (ref 0.1–1.2)
BUN SERPL-MCNC: 20 MG/DL (ref 8–23)
BUN/CREAT SERPL: 19.4 (ref 7–25)
CALCIUM SERPL-MCNC: 10 MG/DL (ref 8.6–10.5)
CHLORIDE SERPL-SCNC: 104 MMOL/L (ref 98–107)
CHOLEST SERPL-MCNC: 124 MG/DL (ref 0–200)
CO2 SERPL-SCNC: 29.2 MMOL/L (ref 22–29)
CREAT SERPL-MCNC: 1.03 MG/DL (ref 0.76–1.27)
GLOBULIN SER CALC-MCNC: 2.8 GM/DL
GLUCOSE SERPL-MCNC: 144 MG/DL (ref 65–99)
HBA1C MFR BLD: 7.3 % (ref 4.8–5.6)
HDLC SERPL-MCNC: 55 MG/DL (ref 40–60)
INTERPRETATION: NORMAL
LDLC SERPL CALC-MCNC: 54 MG/DL (ref 0–100)
Lab: NORMAL
POTASSIUM SERPL-SCNC: 4.8 MMOL/L (ref 3.5–5.2)
PROT SERPL-MCNC: 6.4 G/DL (ref 6–8.5)
SODIUM SERPL-SCNC: 144 MMOL/L (ref 136–145)
T4 FREE SERPL-MCNC: 1.75 NG/DL (ref 0.93–1.7)
TRIGL SERPL-MCNC: 74 MG/DL (ref 0–150)
TSH SERPL DL<=0.005 MIU/L-ACNC: 0.38 MIU/ML (ref 0.27–4.2)
VLDLC SERPL CALC-MCNC: 14.8 MG/DL (ref 5–40)

## 2018-11-16 DIAGNOSIS — E10.8 TYPE 1 DIABETES MELLITUS WITH COMPLICATIONS (HCC): ICD-10-CM

## 2018-11-28 DIAGNOSIS — E10.29 TYPE 1 DIABETES MELLITUS WITH PERSISTENT MICROALBUMINURIA (HCC): ICD-10-CM

## 2018-11-28 DIAGNOSIS — R80.9 TYPE 1 DIABETES MELLITUS WITH PERSISTENT MICROALBUMINURIA (HCC): ICD-10-CM

## 2018-11-28 DIAGNOSIS — I25.10 CORONARY ARTERY DISEASE INVOLVING NATIVE CORONARY ARTERY OF NATIVE HEART WITHOUT ANGINA PECTORIS: ICD-10-CM

## 2018-11-28 DIAGNOSIS — E78.5 HYPERLIPIDEMIA: ICD-10-CM

## 2018-11-28 DIAGNOSIS — E03.9 HYPOTHYROIDISM (ACQUIRED): ICD-10-CM

## 2018-11-28 DIAGNOSIS — N52.9 ERECTILE DYSFUNCTION, UNSPECIFIED ERECTILE DYSFUNCTION TYPE: ICD-10-CM

## 2018-12-14 ENCOUNTER — TELEPHONE (OUTPATIENT)
Dept: ENDOCRINOLOGY | Age: 78
End: 2018-12-14

## 2018-12-14 NOTE — TELEPHONE ENCOUNTER
----- Message from Nilsa Butler sent at 12/13/2018  3:32 PM EST -----  Contact: Mino - meaghan Herzog in sales department with [facility] medical ph. [phone], fax [phone] said patients notes say patient is testing 3 to 4 times a day regards to the script that Dr. Silver wrote for the [place].   Medicare requires that the patient test 4 times  a day and to be stated as such in the note.  She is asking if Dr. Silver wants to do  an  addendum saying patient test 4 xday and signs and dates and send back the whole set of clinical notes.

## 2018-12-19 RX ORDER — CLOPIDOGREL BISULFATE 75 MG/1
75 TABLET ORAL DAILY
Qty: 90 TABLET | Refills: 2 | Status: SHIPPED | OUTPATIENT
Start: 2018-12-19 | End: 2018-12-27 | Stop reason: SDUPTHER

## 2018-12-20 RX ORDER — LEVOTHYROXINE SODIUM 0.1 MG/1
100 TABLET ORAL EVERY MORNING
Qty: 90 TABLET | Refills: 1 | Status: SHIPPED | OUTPATIENT
Start: 2018-12-20 | End: 2018-12-26 | Stop reason: SDUPTHER

## 2018-12-26 RX ORDER — LEVOTHYROXINE SODIUM 0.1 MG/1
100 TABLET ORAL EVERY MORNING
Qty: 90 TABLET | Refills: 1 | Status: SHIPPED | OUTPATIENT
Start: 2018-12-26 | End: 2019-08-09

## 2019-01-02 RX ORDER — CLOPIDOGREL BISULFATE 75 MG/1
75 TABLET ORAL DAILY
Qty: 90 TABLET | Refills: 2 | Status: SHIPPED | OUTPATIENT
Start: 2019-01-02 | End: 2020-02-12 | Stop reason: SDUPTHER

## 2019-01-16 ENCOUNTER — OFFICE VISIT (OUTPATIENT)
Dept: ENDOCRINOLOGY | Age: 79
End: 2019-01-16

## 2019-01-16 VITALS
HEART RATE: 68 BPM | HEIGHT: 67 IN | DIASTOLIC BLOOD PRESSURE: 58 MMHG | BODY MASS INDEX: 21.19 KG/M2 | OXYGEN SATURATION: 97 % | SYSTOLIC BLOOD PRESSURE: 112 MMHG | WEIGHT: 135 LBS

## 2019-01-16 DIAGNOSIS — E03.9 HYPOTHYROIDISM (ACQUIRED): ICD-10-CM

## 2019-01-16 DIAGNOSIS — I10 ESSENTIAL HYPERTENSION: ICD-10-CM

## 2019-01-16 DIAGNOSIS — R80.9 TYPE 1 DIABETES MELLITUS WITH PERSISTENT MICROALBUMINURIA (HCC): ICD-10-CM

## 2019-01-16 DIAGNOSIS — E10.43 TYPE 1 DIABETES MELLITUS WITH AUTONOMIC NEUROPATHY (HCC): Primary | ICD-10-CM

## 2019-01-16 DIAGNOSIS — E89.0 POSTOPERATIVE HYPOTHYROIDISM: ICD-10-CM

## 2019-01-16 DIAGNOSIS — E10.8 TYPE 1 DIABETES MELLITUS WITH COMPLICATIONS (HCC): ICD-10-CM

## 2019-01-16 DIAGNOSIS — E78.5 HYPERLIPIDEMIA, UNSPECIFIED HYPERLIPIDEMIA TYPE: ICD-10-CM

## 2019-01-16 DIAGNOSIS — E10.649 HYPOGLYCEMIC UNAWARENESS ASSOCIATED WITH TYPE 1 DIABETES MELLITUS: ICD-10-CM

## 2019-01-16 DIAGNOSIS — I25.10 CORONARY ARTERY DISEASE INVOLVING NATIVE CORONARY ARTERY OF NATIVE HEART WITHOUT ANGINA PECTORIS: ICD-10-CM

## 2019-01-16 DIAGNOSIS — E10.29 TYPE 1 DIABETES MELLITUS WITH PROTEINURIA (HCC): ICD-10-CM

## 2019-01-16 DIAGNOSIS — R80.9 TYPE 1 DIABETES MELLITUS WITH PROTEINURIA (HCC): ICD-10-CM

## 2019-01-16 DIAGNOSIS — E10.29 TYPE 1 DIABETES MELLITUS WITH PERSISTENT MICROALBUMINURIA (HCC): ICD-10-CM

## 2019-01-16 DIAGNOSIS — N52.9 ERECTILE DYSFUNCTION, UNSPECIFIED ERECTILE DYSFUNCTION TYPE: ICD-10-CM

## 2019-01-16 PROCEDURE — 99214 OFFICE O/P EST MOD 30 MIN: CPT | Performed by: INTERNAL MEDICINE

## 2019-01-16 NOTE — PROGRESS NOTES
Subjective   Griffin Angeles is a 78 y.o. male.     Pt work in today due to bs dropping low seen in the EMS came on Sat       Diabetes     Hyperlipidemia        Patient is a 77-year-old male who came in for diabetes control. He has known diabetes mellitus for 30 years. He has been on Novolin N 5 units every morning and 5 at supper and Novolin R 5 units every morning and 5 units before supper. He has intermittent hypoglycemia in early AM.  He has been taking extra Novolin N at supper time.  He checks his blood sugar 4 times per day.  He eats at 11 AM, 2:30 PM and 6 PM.  He has few hypoglycemic episodes but did not require glucagon injection.  His last meal was 12 PM      His last eye examination was in 12/18 with Dr. Benoit. He has no diabetic retinopathy but has macular degeneration on left eye. He had left cataract surgery in August 2017.  He denies numbness, tingling or burning on his hands or feet. He has albuminuria on urine sample taken in 1/18. He is on losartan HCT       He had previous thyroidectomy for unknown thyroid disease. He has been on levothyroxine 100 mcg per day. He has lost 3 pounds since June 2018      He has known coronary disease and had previous angioplasty with stent and history of hypertension. He has occasional chest pain but has not used NTG. He had a normal stress test in done in 9/18 by Dr. Kong. He is on Toprol and Imdur.  He denies SOB.      He has hyperlipidemia and has been on Lipitor 20 mg once a day. He denies any myalgia.     He was seen for gait abnormality last September 2017 by Dr. Mcdonald.  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  He has been seen by neurologist at the [facility] and patient is part of the study.  Namenda made him too sleepy and it was discontinued.  He is not on medications.  There is no associated urinary incontinence or headaches.  He has difficulty with short-term memory.   " He has not been taking aspirin.  They do not remember whether he had a carotid ultrasound.  He is seeing neurologist Dr. Darren Huang.        He has erectile dysfunction and has been seeing Dr. Love. He had penile implant in 8/16.  He was started on Flomax prn for urinary retention.       The following portions of the patient's history were reviewed and updated as appropriate: allergies, current medications, past family history, past medical history, past social history, past surgical history and problem list.    Review of Systems   Constitutional: Negative.    HENT: Negative.    Eyes: Negative.    Respiratory: Negative.    Cardiovascular: Negative.    Gastrointestinal: Negative.    Endocrine: Negative.    Genitourinary: Negative.    Musculoskeletal: Negative.    Skin: Negative.    Allergic/Immunologic: Negative.    Neurological: Negative.    Hematological: Negative.    Psychiatric/Behavioral: Negative.        Objective      Vitals:    01/16/19 1541   BP: 112/58   BP Location: Right arm   Patient Position: Sitting   Cuff Size: Small Adult   Pulse: 68   SpO2: 97%   Weight: 61.2 kg (135 lb)   Height: 170.2 cm (67.01\")     Physical Exam   Constitutional: He is oriented to person, place, and time. He appears well-developed and well-nourished. No distress.   HENT:   Head: Normocephalic.   Mouth/Throat: No oropharyngeal exudate.   Eyes: Conjunctivae and EOM are normal. Right eye exhibits no discharge. Left eye exhibits no discharge. No scleral icterus.   Neck: Neck supple. No JVD present. No tracheal deviation present. No thyromegaly present.   Cardiovascular: Normal rate, regular rhythm, normal heart sounds and intact distal pulses. Exam reveals no gallop and no friction rub.   No murmur heard.  Pulmonary/Chest: Effort normal and breath sounds normal. No stridor. No respiratory distress. He has no wheezes. He has no rales. He exhibits no tenderness.   Abdominal: Soft. Bowel sounds are normal. He exhibits no distension " and no mass. There is no tenderness. There is no guarding.   Musculoskeletal: Normal range of motion. He exhibits no edema, tenderness or deformity.   Lymphadenopathy:     He has no cervical adenopathy.   Neurological: He is alert and oriented to person, place, and time. He displays normal reflexes. Coordination normal.   Skin: Skin is warm and dry. No rash noted. No erythema.   Psychiatric: He has a normal mood and affect. His behavior is normal.     Office Visit on 11/07/2018   Component Date Value Ref Range Status   • Glucose 11/07/2018 144* 65 - 99 mg/dL Final   • BUN 11/07/2018 20  8 - 23 mg/dL Final   • Creatinine 11/07/2018 1.03  0.76 - 1.27 mg/dL Final   • eGFR Non  Am 11/07/2018 70  >60 mL/min/1.73 Final    Comment: The MDRD GFR formula is only valid for adults with stable  renal function between ages 18 and 70.     • eGFR  Am 11/07/2018 85  >60 mL/min/1.73 Final   • BUN/Creatinine Ratio 11/07/2018 19.4  7.0 - 25.0 Final   • Sodium 11/07/2018 144  136 - 145 mmol/L Final   • Potassium 11/07/2018 4.8  3.5 - 5.2 mmol/L Final   • Chloride 11/07/2018 104  98 - 107 mmol/L Final   • Total CO2 11/07/2018 29.2* 22.0 - 29.0 mmol/L Final   • Calcium 11/07/2018 10.0  8.6 - 10.5 mg/dL Final   • Total Protein 11/07/2018 6.4  6.0 - 8.5 g/dL Final   • Albumin 11/07/2018 3.60  3.50 - 5.20 g/dL Final   • Globulin 11/07/2018 2.8  gm/dL Final   • A/G Ratio 11/07/2018 1.3  g/dL Final   • Total Bilirubin 11/07/2018 0.5  0.1 - 1.2 mg/dL Final   • Alkaline Phosphatase 11/07/2018 222* 39 - 117 U/L Final   • AST (SGOT) 11/07/2018 13  1 - 40 U/L Final   • ALT (SGPT) 11/07/2018 12  1 - 41 U/L Final   • Total Cholesterol 11/07/2018 124  0 - 200 mg/dL Final   • Triglycerides 11/07/2018 74  0 - 150 mg/dL Final   • HDL Cholesterol 11/07/2018 55  40 - 60 mg/dL Final   • VLDL Cholesterol 11/07/2018 14.8  5 - 40 mg/dL Final   • LDL Cholesterol  11/07/2018 54  0 - 100 mg/dL Final   • Hemoglobin A1C 11/07/2018 7.30* 4.80 - 5.60 %  Final    Comment: Hemoglobin A1C Ranges:  Increased Risk for Diabetes  5.7% to 6.4%  Diabetes                     >= 6.5%  Diabetic Goal                < 7.0%     • TSH 11/07/2018 0.377  0.270 - 4.200 mIU/mL Final   • Free T4 11/07/2018 1.75* 0.93 - 1.70 ng/dL Final   • Interpretation 11/07/2018 Note   Final    Supplemental report is available.   • PDF Image 11/07/2018 Not applicable   Final     Assessment/Plan   Griffin was seen today for diabetes and hyperlipidemia.    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 (CMS/HCC)    Type 1 diabetes mellitus with proteinuria (CMS/HCC)  -     Microalbumin / Creatinine Urine Ratio - Urine, Clean Catch    Type 1 diabetes mellitus with complications (CMS/HCC)    Postoperative hypothyroidism    Coronary artery disease involving native coronary artery of native heart without angina pectoris  -     insulin NPH (NOVOLIN N RELION) 100 UNIT/ML injection; 8 units before breakfast and 5 units at bedtime    Hyperlipidemia, unspecified hyperlipidemia type    Essential hypertension    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 (CMS/HCC)  -     insulin NPH (NOVOLIN N RELION) 100 UNIT/ML injection; 8 units before breakfast and 5 units at bedtime    Hypothyroidism (acquired)  -     insulin NPH (NOVOLIN N RELION) 100 UNIT/ML injection; 8 units before breakfast and 5 units at bedtime    Erectile dysfunction, unspecified erectile dysfunction type  -     insulin NPH (NOVOLIN N RELION) 100 UNIT/ML injection; 8 units before breakfast and 5 units at bedtime    Other orders  -     insulin regular (NOVOLIN R RELION) 100 UNIT/ML injection; 6 units before breakfast and 5 units before supper      Novolin N 8 units at breakfast and 5 units at bedtime.  Novolin R 6 units before breakfast and 5 units before supper  Do not take any Novolin N at supper time.  Send a  printout of FreeStyle nitza in one week.  Continue levothyroxine 100 µg per day.  Continue losartan HCT, Toprol, and indoor.    Send copy of my note to Dr. Quevedo, Dr. Benoit, Dr. Blue Acevedo and Dr. Darren Huang    RTC 4 mos

## 2019-01-17 LAB
ALBUMIN/CREAT UR: 62.7 MG/G CREAT (ref 0–30)
CREAT UR-MCNC: 101.3 MG/DL
MICROALBUMIN UR-MCNC: 63.5 UG/ML

## 2019-02-06 DIAGNOSIS — E10.29 TYPE 1 DIABETES MELLITUS WITH PERSISTENT MICROALBUMINURIA (HCC): ICD-10-CM

## 2019-02-06 DIAGNOSIS — E03.9 HYPOTHYROIDISM (ACQUIRED): ICD-10-CM

## 2019-02-06 DIAGNOSIS — N52.9 ERECTILE DYSFUNCTION, UNSPECIFIED ERECTILE DYSFUNCTION TYPE: ICD-10-CM

## 2019-02-06 DIAGNOSIS — I25.10 CORONARY ARTERY DISEASE INVOLVING NATIVE CORONARY ARTERY OF NATIVE HEART WITHOUT ANGINA PECTORIS: ICD-10-CM

## 2019-02-06 DIAGNOSIS — E78.5 HYPERLIPIDEMIA: ICD-10-CM

## 2019-02-06 DIAGNOSIS — R80.9 TYPE 1 DIABETES MELLITUS WITH PERSISTENT MICROALBUMINURIA (HCC): ICD-10-CM

## 2019-02-18 DIAGNOSIS — E10.43 TYPE 1 DIABETES MELLITUS WITH AUTONOMIC NEUROPATHY (HCC): ICD-10-CM

## 2019-02-18 DIAGNOSIS — I10 ESSENTIAL HYPERTENSION: Primary | ICD-10-CM

## 2019-02-18 DIAGNOSIS — E89.0 POSTOPERATIVE HYPOTHYROIDISM: ICD-10-CM

## 2019-02-18 DIAGNOSIS — E78.49 OTHER HYPERLIPIDEMIA: ICD-10-CM

## 2019-03-01 ENCOUNTER — RESULTS ENCOUNTER (OUTPATIENT)
Dept: ENDOCRINOLOGY | Age: 79
End: 2019-03-01

## 2019-03-01 DIAGNOSIS — E78.49 OTHER HYPERLIPIDEMIA: ICD-10-CM

## 2019-03-01 DIAGNOSIS — E10.43 TYPE 1 DIABETES MELLITUS WITH AUTONOMIC NEUROPATHY (HCC): ICD-10-CM

## 2019-03-01 DIAGNOSIS — I10 ESSENTIAL HYPERTENSION: ICD-10-CM

## 2019-03-01 DIAGNOSIS — E89.0 POSTOPERATIVE HYPOTHYROIDISM: ICD-10-CM

## 2019-03-01 LAB
ALBUMIN SERPL-MCNC: 4 G/DL (ref 3.5–5.2)
ALBUMIN/GLOB SERPL: 1.6 G/DL
ALP SERPL-CCNC: 222 U/L (ref 39–117)
ALT SERPL-CCNC: 15 U/L (ref 1–41)
AST SERPL-CCNC: 10 U/L (ref 1–40)
BILIRUB SERPL-MCNC: 0.6 MG/DL (ref 0.1–1.2)
BUN SERPL-MCNC: 22 MG/DL (ref 8–23)
BUN/CREAT SERPL: 19 (ref 7–25)
CALCIUM SERPL-MCNC: 10.1 MG/DL (ref 8.6–10.5)
CHLORIDE SERPL-SCNC: 103 MMOL/L (ref 98–107)
CHOLEST SERPL-MCNC: 126 MG/DL (ref 0–200)
CO2 SERPL-SCNC: 28.8 MMOL/L (ref 22–29)
CREAT SERPL-MCNC: 1.16 MG/DL (ref 0.76–1.27)
GLOBULIN SER CALC-MCNC: 2.5 GM/DL
GLUCOSE SERPL-MCNC: 180 MG/DL (ref 65–99)
HBA1C MFR BLD: 7.3 % (ref 4.8–5.6)
HDLC SERPL-MCNC: 66 MG/DL (ref 40–60)
INTERPRETATION: NORMAL
LDLC SERPL CALC-MCNC: 45 MG/DL (ref 0–100)
Lab: NORMAL
POTASSIUM SERPL-SCNC: 4.4 MMOL/L (ref 3.5–5.2)
PROT SERPL-MCNC: 6.5 G/DL (ref 6–8.5)
SODIUM SERPL-SCNC: 144 MMOL/L (ref 136–145)
T4 FREE SERPL-MCNC: 1.91 NG/DL (ref 0.93–1.7)
TRIGL SERPL-MCNC: 75 MG/DL (ref 0–150)
TSH SERPL DL<=0.005 MIU/L-ACNC: 0.47 MIU/ML (ref 0.27–4.2)
VLDLC SERPL CALC-MCNC: 15 MG/DL (ref 5–40)

## 2019-03-13 NOTE — PROGRESS NOTES
Subjective   Griffin Angeles is a 78 y.o. male.     F/u for dm 1, hyperlipidemia, ED/testing/ using freestyle nitza   last dm eye exam 12/18/ last dm foot exam today with dr Silver        Patient is a 77-year-old male who came in for diabetes control. He has known diabetes mellitus for 30 years. He has been on Novolin N 8 units every morning and 5 at bedtime and Novolin R 5 units every morning and 5 units before supper.  Breakfast is between 8-10 AM.  Lunchtime is between 1-4 PM.  Suppertime is around 6 PM.  He has intermittent hypoglycemia in early AM.    He checks his blood sugar 4 times per day.  He has started on Freestyle nitza records reviewed.  He has few hypoglycemic episodes but did not require glucagon injection.  Hemoglobin A1c 7.30%. His last meal was 9 AM      His last eye examination was in 12/18 with Dr. Benoit. He has no diabetic retinopathy but has macular degeneration on left eye. He had left cataract surgery in August 2017.  He denies numbness, tingling or burning on his hands or feet. He has albuminuria on urine sample taken in 1/19. He is on losartan HCT       He had previous thyroidectomy for unknown thyroid disease. He has been on levothyroxine 100 mcg per day. He has lost 3 pounds since June 2018.  TSH done in 3/19 was normal in March 2019 at 0.469.      He has known coronary disease and had previous angioplasty with stent and history of hypertension. He has occasional chest pain but has not used NTG. He had a normal stress test in done in 9/18 by Dr. Kong. He is on Toprol and Imdur.  He denies SOB.      He has hyperlipidemia and has been on Lipitor 20 mg once a day. He denies any myalgia.  Lipid profile done in March 2019 as follows: Cholesterol 126. HDL 66.  LDL 45.  Triglycerides 75.     He was seen for gait abnormality last September 2017 by Dr. Mcdonald.  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  He has been seen by  "neurologist at the [facility] and patient is part of the study.  Namenda made him too sleepy and it was discontinued.  He is not on medications.  There is no associated urinary incontinence or headaches.  He has difficulty with short-term memory.   He has not been taking aspirin.  They do not remember whether he had a carotid ultrasound.  He is seeing neurologist Dr. Darren Huang.        He has erectile dysfunction and has been seeing Dr. Love. He had penile implant in 8/16.  He was started on Flomax prn for urinary retention.     The following portions of the patient's history were reviewed and updated as appropriate: allergies, current medications, past family history, past medical history, past social history, past surgical history and problem list.    Review of Systems   Constitutional: Positive for chills.   HENT: Negative.    Eyes: Negative.    Respiratory: Negative.    Cardiovascular: Negative.    Gastrointestinal: Negative.    Endocrine: Negative.    Genitourinary: Negative.    Musculoskeletal: Negative.    Skin: Negative.    Allergic/Immunologic: Negative.    Hematological: Bruises/bleeds easily.   Psychiatric/Behavioral: Negative.        Objective      Vitals:    03/14/19 1141   BP: 128/64   BP Location: Right arm   Patient Position: Sitting   Cuff Size: Small Adult   Pulse: 57   SpO2: 97%   Weight: 60.5 kg (133 lb 6.4 oz)   Height: 170.2 cm (67.01\")     Physical Exam   Constitutional: He is oriented to person, place, and time. He appears well-developed and well-nourished.   HENT:   Head: Normocephalic.   Nose: Nose normal.   Mouth/Throat: No oropharyngeal exudate.   Eyes: Conjunctivae and EOM are normal. Right eye exhibits no discharge. Left eye exhibits no discharge. No scleral icterus.   Neck: No JVD present. No tracheal deviation present. No thyromegaly present.   Cardiovascular: Normal rate, regular rhythm, normal heart sounds and intact distal pulses. Exam reveals no friction rub.   No " murmur heard.  Pulmonary/Chest: Effort normal and breath sounds normal. No stridor. No respiratory distress. He has no wheezes. He has no rales.   Abdominal: Soft. Bowel sounds are normal. He exhibits no distension and no mass. There is no tenderness. There is no guarding.   Musculoskeletal: Normal range of motion. He exhibits no edema, tenderness or deformity.   Lymphadenopathy:     He has no cervical adenopathy.   Neurological: He is alert and oriented to person, place, and time.   Skin: Skin is warm and dry.   Psychiatric: He has a normal mood and affect. His behavior is normal.     Results Encounter on 03/01/2019   Component Date Value Ref Range Status   • Glucose 03/01/2019 180* 65 - 99 mg/dL Final   • BUN 03/01/2019 22  8 - 23 mg/dL Final   • Creatinine 03/01/2019 1.16  0.76 - 1.27 mg/dL Final   • eGFR Non African Am 03/01/2019 61  >60 mL/min/1.73 Final    Comment: The MDRD GFR formula is only valid for adults with stable  renal function between ages 18 and 70.     • eGFR  Am 03/01/2019 74  >60 mL/min/1.73 Final   • BUN/Creatinine Ratio 03/01/2019 19.0  7.0 - 25.0 Final   • Sodium 03/01/2019 144  136 - 145 mmol/L Final   • Potassium 03/01/2019 4.4  3.5 - 5.2 mmol/L Final   • Chloride 03/01/2019 103  98 - 107 mmol/L Final   • Total CO2 03/01/2019 28.8  22.0 - 29.0 mmol/L Final   • Calcium 03/01/2019 10.1  8.6 - 10.5 mg/dL Final   • Total Protein 03/01/2019 6.5  6.0 - 8.5 g/dL Final   • Albumin 03/01/2019 4.00  3.50 - 5.20 g/dL Final   • Globulin 03/01/2019 2.5  gm/dL Final   • A/G Ratio 03/01/2019 1.6  g/dL Final   • Total Bilirubin 03/01/2019 0.6  0.1 - 1.2 mg/dL Final   • Alkaline Phosphatase 03/01/2019 222* 39 - 117 U/L Final   • AST (SGOT) 03/01/2019 10  1 - 40 U/L Final   • ALT (SGPT) 03/01/2019 15  1 - 41 U/L Final   • Total Cholesterol 03/01/2019 126  0 - 200 mg/dL Final   • Triglycerides 03/01/2019 75  0 - 150 mg/dL Final   • HDL Cholesterol 03/01/2019 66* 40 - 60 mg/dL Final   • VLDL Cholesterol  03/01/2019 15  5 - 40 mg/dL Final   • LDL Cholesterol  03/01/2019 45  0 - 100 mg/dL Final   • Hemoglobin A1C 03/01/2019 7.30* 4.80 - 5.60 % Final    Comment: Hemoglobin A1C Ranges:  Increased Risk for Diabetes  5.7% to 6.4%  Diabetes                     >= 6.5%  Diabetic Goal                < 7.0%     • Free T4 03/01/2019 1.91* 0.93 - 1.70 ng/dL Final   • TSH 03/01/2019 0.469  0.270 - 4.200 mIU/mL Final   • Interpretation 03/01/2019 Note   Final    Supplemental report is available.   • PDF Image 03/01/2019 Not applicable   Final     Assessment/Plan   Griffin was seen today for diabetes, hyperlipidemia and erectile dysfunction.    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 (CMS/HCC)    Type 1 diabetes mellitus with autonomic neuropathy (CMS/[facility])    Postoperative hypothyroidism    Coronary artery disease involving native coronary artery of native heart without angina pectoris    Other hyperlipidemia    Essential hypertension    Multiple lacunar infarcts    Other orders  -     insulin aspart (NOVOLOG FLEXPEN) 100 UNIT/ML solution pen-injector sc pen; 3-5 units with each meal  -     insulin degludec (TRESIBA FLEXTOUCH) 100 UNIT/ML solution pen-injector injection; Inject 8 Units under the skin into the appropriate area as directed Every Morning.  -     Insulin Pen Needle (PEN NEEDLES) 32G X 4 MM misc; 1 each 4 (Four) Times a Day.      Patient has variable mealtimes which to make using Novolin N and Novolin R difficult.  Continue levothyroxine 100 mcg/day.  Continue Lipitor 20 mg/day.  Will defer treatment of hypertension and coronary artery disease to Dr. Dean.    Send copy of my note to Dr. Quevedo and Dr. Dean    RTC 4 mos.

## 2019-03-14 ENCOUNTER — OFFICE VISIT (OUTPATIENT)
Dept: ENDOCRINOLOGY | Age: 79
End: 2019-03-14

## 2019-03-14 VITALS
HEART RATE: 57 BPM | DIASTOLIC BLOOD PRESSURE: 64 MMHG | WEIGHT: 133.4 LBS | BODY MASS INDEX: 20.94 KG/M2 | SYSTOLIC BLOOD PRESSURE: 128 MMHG | HEIGHT: 67 IN | OXYGEN SATURATION: 97 %

## 2019-03-14 DIAGNOSIS — I25.10 CORONARY ARTERY DISEASE INVOLVING NATIVE CORONARY ARTERY OF NATIVE HEART WITHOUT ANGINA PECTORIS: ICD-10-CM

## 2019-03-14 DIAGNOSIS — E78.49 OTHER HYPERLIPIDEMIA: ICD-10-CM

## 2019-03-14 DIAGNOSIS — E10.649 HYPOGLYCEMIC UNAWARENESS ASSOCIATED WITH TYPE 1 DIABETES MELLITUS: ICD-10-CM

## 2019-03-14 DIAGNOSIS — I10 ESSENTIAL HYPERTENSION: ICD-10-CM

## 2019-03-14 DIAGNOSIS — E89.0 POSTOPERATIVE HYPOTHYROIDISM: ICD-10-CM

## 2019-03-14 DIAGNOSIS — R80.9 TYPE 1 DIABETES MELLITUS WITH PROTEINURIA (HCC): Primary | ICD-10-CM

## 2019-03-14 DIAGNOSIS — I63.81 MULTIPLE LACUNAR INFARCTS (HCC): ICD-10-CM

## 2019-03-14 DIAGNOSIS — E10.29 TYPE 1 DIABETES MELLITUS WITH PROTEINURIA (HCC): Primary | ICD-10-CM

## 2019-03-14 DIAGNOSIS — E10.43 TYPE 1 DIABETES MELLITUS WITH AUTONOMIC NEUROPATHY (HCC): ICD-10-CM

## 2019-03-14 DIAGNOSIS — E10.8 TYPE 1 DIABETES MELLITUS WITH COMPLICATIONS (HCC): ICD-10-CM

## 2019-03-14 PROCEDURE — 99214 OFFICE O/P EST MOD 30 MIN: CPT | Performed by: INTERNAL MEDICINE

## 2019-03-14 RX ORDER — ESCITALOPRAM OXALATE 5 MG/1
10 TABLET ORAL DAILY
COMMUNITY
Start: 2019-02-14 | End: 2022-04-15 | Stop reason: HOSPADM

## 2019-03-14 RX ORDER — PEN NEEDLE, DIABETIC 30 GX3/16"
1 NEEDLE, DISPOSABLE MISCELLANEOUS 4 TIMES DAILY
Qty: 400 EACH | Refills: 5 | Status: SHIPPED | OUTPATIENT
Start: 2019-03-14 | End: 2020-04-17

## 2019-03-14 RX ORDER — DONEPEZIL HYDROCHLORIDE 5 MG/1
5 TABLET, FILM COATED ORAL DAILY
COMMUNITY
Start: 2019-02-14 | End: 2020-02-14

## 2019-03-25 ENCOUNTER — TELEPHONE (OUTPATIENT)
Dept: CARDIOLOGY | Facility: CLINIC | Age: 79
End: 2019-03-25

## 2019-03-25 NOTE — TELEPHONE ENCOUNTER
Verito with [facility] [phone] ex. [phone].    Pt needs to hold plavix for 5 days prior to colonoscopy may 2.

## 2019-03-27 NOTE — TELEPHONE ENCOUNTER
Per Dr Kong ok to hold Plavix 5 days prior to Colonoscopy     Pt informed   Verito with [facility] informed

## 2019-04-16 DIAGNOSIS — H35.09 RETINAL ARTERY PLAQUE: Primary | ICD-10-CM

## 2019-04-16 DIAGNOSIS — I70.8 RETINAL ARTERY PLAQUE: Primary | ICD-10-CM

## 2019-04-17 DIAGNOSIS — I63.81 MULTIPLE LACUNAR INFARCTS (HCC): ICD-10-CM

## 2019-04-17 DIAGNOSIS — I25.10 CORONARY ARTERY DISEASE INVOLVING NATIVE CORONARY ARTERY OF NATIVE HEART WITHOUT ANGINA PECTORIS: ICD-10-CM

## 2019-04-17 DIAGNOSIS — H34.212 CHOLESTEROL RETINAL EMBOLUS OF LEFT EYE: Primary | ICD-10-CM

## 2019-05-06 ENCOUNTER — HOSPITAL ENCOUNTER (OUTPATIENT)
Dept: CARDIOLOGY | Facility: HOSPITAL | Age: 79
Discharge: HOME | End: 2019-05-06
Admitting: INTERNAL MEDICINE

## 2019-05-06 ENCOUNTER — HOSPITAL ENCOUNTER (OUTPATIENT)
Dept: CARDIOLOGY | Facility: HOSPITAL | Age: 79
Discharge: HOME | End: 2019-05-06

## 2019-05-06 VITALS — BODY MASS INDEX: 20.88 KG/M2 | WEIGHT: 133 LBS | HEIGHT: 67 IN

## 2019-05-06 DIAGNOSIS — H34.212 CHOLESTEROL RETINAL EMBOLUS OF LEFT EYE: ICD-10-CM

## 2019-05-06 DIAGNOSIS — I63.81 MULTIPLE LACUNAR INFARCTS (HCC): ICD-10-CM

## 2019-05-06 DIAGNOSIS — I25.10 CORONARY ARTERY DISEASE INVOLVING NATIVE CORONARY ARTERY OF NATIVE HEART WITHOUT ANGINA PECTORIS: ICD-10-CM

## 2019-05-06 LAB
BH CV ECHO MEAS - ACS: 1.5 CM
BH CV ECHO MEAS - AO MAX PG (FULL): 8.5 MMHG
BH CV ECHO MEAS - AO MAX PG: 13.1 MMHG
BH CV ECHO MEAS - AO MEAN PG (FULL): 4 MMHG
BH CV ECHO MEAS - AO MEAN PG: 6 MMHG
BH CV ECHO MEAS - AO ROOT AREA (BSA CORRECTED): 1.5
BH CV ECHO MEAS - AO ROOT AREA: 4.9 CM^2
BH CV ECHO MEAS - AO ROOT DIAM: 2.5 CM
BH CV ECHO MEAS - AO V2 MAX: 181 CM/SEC
BH CV ECHO MEAS - AO V2 MEAN: 117 CM/SEC
BH CV ECHO MEAS - AO V2 VTI: 45.7 CM
BH CV ECHO MEAS - AVA(I,A): 1.5 CM^2
BH CV ECHO MEAS - AVA(I,D): 1.5 CM^2
BH CV ECHO MEAS - AVA(V,A): 1.7 CM^2
BH CV ECHO MEAS - AVA(V,D): 1.7 CM^2
BH CV ECHO MEAS - BSA(HAYCOCK): 1.7 M^2
BH CV ECHO MEAS - BSA: 1.7 M^2
BH CV ECHO MEAS - BZI_BMI: 20.8 KILOGRAMS/M^2
BH CV ECHO MEAS - BZI_METRIC_HEIGHT: 170.2 CM
BH CV ECHO MEAS - BZI_METRIC_WEIGHT: 60.3 KG
BH CV ECHO MEAS - EDV(CUBED): 68.9 ML
BH CV ECHO MEAS - EDV(MOD-SP2): 38 ML
BH CV ECHO MEAS - EDV(MOD-SP4): 58 ML
BH CV ECHO MEAS - EDV(TEICH): 74.2 ML
BH CV ECHO MEAS - EF(CUBED): 64.6 %
BH CV ECHO MEAS - EF(MOD-BP): 57 %
BH CV ECHO MEAS - EF(MOD-SP2): 60.5 %
BH CV ECHO MEAS - EF(MOD-SP4): 51.7 %
BH CV ECHO MEAS - EF(TEICH): 56.6 %
BH CV ECHO MEAS - ESV(CUBED): 24.4 ML
BH CV ECHO MEAS - ESV(MOD-SP2): 15 ML
BH CV ECHO MEAS - ESV(MOD-SP4): 28 ML
BH CV ECHO MEAS - ESV(TEICH): 32.2 ML
BH CV ECHO MEAS - FS: 29.3 %
BH CV ECHO MEAS - IVS/LVPW: 0.8
BH CV ECHO MEAS - IVSD: 0.8 CM
BH CV ECHO MEAS - LAT PEAK E' VEL: 8 CM/SEC
BH CV ECHO MEAS - LV DIASTOLIC VOL/BSA (35-75): 34.1 ML/M^2
BH CV ECHO MEAS - LV MASS(C)D: 114.1 GRAMS
BH CV ECHO MEAS - LV MASS(C)DI: 67.1 GRAMS/M^2
BH CV ECHO MEAS - LV MAX PG: 4.6 MMHG
BH CV ECHO MEAS - LV MEAN PG: 2 MMHG
BH CV ECHO MEAS - LV SYSTOLIC VOL/BSA (12-30): 16.5 ML/M^2
BH CV ECHO MEAS - LV V1 MAX: 107 CM/SEC
BH CV ECHO MEAS - LV V1 MEAN: 60.6 CM/SEC
BH CV ECHO MEAS - LV V1 VTI: 23.6 CM
BH CV ECHO MEAS - LVIDD: 4.1 CM
BH CV ECHO MEAS - LVIDS: 2.9 CM
BH CV ECHO MEAS - LVLD AP2: 6.1 CM
BH CV ECHO MEAS - LVLD AP4: 6.7 CM
BH CV ECHO MEAS - LVLS AP2: 5.4 CM
BH CV ECHO MEAS - LVLS AP4: 5.8 CM
BH CV ECHO MEAS - LVOT AREA (M): 2.8 CM^2
BH CV ECHO MEAS - LVOT AREA: 2.8 CM^2
BH CV ECHO MEAS - LVOT DIAM: 1.9 CM
BH CV ECHO MEAS - LVPWD: 1 CM
BH CV ECHO MEAS - MED PEAK E' VEL: 7 CM/SEC
BH CV ECHO MEAS - MV A DUR: 0.11 SEC
BH CV ECHO MEAS - MV A MAX VEL: 96 CM/SEC
BH CV ECHO MEAS - MV DEC SLOPE: 529 CM/SEC^2
BH CV ECHO MEAS - MV DEC TIME: 0.2 SEC
BH CV ECHO MEAS - MV E MAX VEL: 80.5 CM/SEC
BH CV ECHO MEAS - MV E/A: 0.84
BH CV ECHO MEAS - MV MAX PG: 6.3 MMHG
BH CV ECHO MEAS - MV MEAN PG: 2 MMHG
BH CV ECHO MEAS - MV P1/2T MAX VEL: 126 CM/SEC
BH CV ECHO MEAS - MV P1/2T: 69.8 MSEC
BH CV ECHO MEAS - MV V2 MAX: 125 CM/SEC
BH CV ECHO MEAS - MV V2 MEAN: 64.8 CM/SEC
BH CV ECHO MEAS - MV V2 VTI: 42.4 CM
BH CV ECHO MEAS - MVA P1/2T LCG: 1.7 CM^2
BH CV ECHO MEAS - MVA(P1/2T): 3.2 CM^2
BH CV ECHO MEAS - MVA(VTI): 1.6 CM^2
BH CV ECHO MEAS - PA ACC TIME: 0.07 SEC
BH CV ECHO MEAS - PA MAX PG (FULL): 2.8 MMHG
BH CV ECHO MEAS - PA MAX PG: 3.8 MMHG
BH CV ECHO MEAS - PA PR(ACCEL): 45.7 MMHG
BH CV ECHO MEAS - PA V2 MAX: 97.7 CM/SEC
BH CV ECHO MEAS - PI END-D VEL: 67.9 CM/SEC
BH CV ECHO MEAS - PULM A REVS DUR: 0.12 SEC
BH CV ECHO MEAS - PULM A REVS VEL: 28.1 CM/SEC
BH CV ECHO MEAS - PULM DIAS VEL: 46.6 CM/SEC
BH CV ECHO MEAS - PULM S/D: 1.4
BH CV ECHO MEAS - PULM SYS VEL: 66.9 CM/SEC
BH CV ECHO MEAS - PVA(V,A): 1.6 CM^2
BH CV ECHO MEAS - PVA(V,D): 1.6 CM^2
BH CV ECHO MEAS - QP/QS: 0.58
BH CV ECHO MEAS - RV MAX PG: 0.98 MMHG
BH CV ECHO MEAS - RV MEAN PG: 0 MMHG
BH CV ECHO MEAS - RV V1 MAX: 49.5 CM/SEC
BH CV ECHO MEAS - RV V1 MEAN: 31 CM/SEC
BH CV ECHO MEAS - RV V1 VTI: 12.4 CM
BH CV ECHO MEAS - RVOT AREA: 3.1 CM^2
BH CV ECHO MEAS - RVOT DIAM: 2 CM
BH CV ECHO MEAS - SI(AO): 131.9 ML/M^2
BH CV ECHO MEAS - SI(CUBED): 26.2 ML/M^2
BH CV ECHO MEAS - SI(LVOT): 39.4 ML/M^2
BH CV ECHO MEAS - SI(MOD-SP2): 13.5 ML/M^2
BH CV ECHO MEAS - SI(MOD-SP4): 17.6 ML/M^2
BH CV ECHO MEAS - SI(TEICH): 24.7 ML/M^2
BH CV ECHO MEAS - SV(AO): 224.3 ML
BH CV ECHO MEAS - SV(CUBED): 44.5 ML
BH CV ECHO MEAS - SV(LVOT): 66.9 ML
BH CV ECHO MEAS - SV(MOD-SP2): 23 ML
BH CV ECHO MEAS - SV(MOD-SP4): 30 ML
BH CV ECHO MEAS - SV(RVOT): 39 ML
BH CV ECHO MEAS - SV(TEICH): 42 ML
BH CV ECHO MEAS - TAPSE (>1.6): 2.4 CM2
BH CV ECHO MEASUREMENTS AVERAGE E/E' RATIO: 10.73
BH CV VAS BP RIGHT ARM: NORMAL MMHG
BH CV XLRA - RV BASE: 2.9 CM
BH CV XLRA - TDI S': 11 CM/SEC
LEFT ATRIUM VOLUME INDEX: 24 ML/M2
LV EF 2D ECHO EST: 57 %
MAXIMAL PREDICTED HEART RATE: 143 BPM
STRESS TARGET HR: 122 BPM

## 2019-05-06 PROCEDURE — 93306 TTE W/DOPPLER COMPLETE: CPT

## 2019-05-06 PROCEDURE — 93306 TTE W/DOPPLER COMPLETE: CPT | Performed by: INTERNAL MEDICINE

## 2019-05-09 ENCOUNTER — HOSPITAL ENCOUNTER (OUTPATIENT)
Dept: CARDIOLOGY | Facility: HOSPITAL | Age: 79
Discharge: HOME | End: 2019-05-09
Admitting: INTERNAL MEDICINE

## 2019-05-09 LAB
BH CV XLRA MEAS LEFT CCA RATIO VEL: 67.4 CM/SEC
BH CV XLRA MEAS LEFT DIST CCA EDV: 13.5 CM/SEC
BH CV XLRA MEAS LEFT DIST CCA PSV: 67.4 CM/SEC
BH CV XLRA MEAS LEFT DIST ICA EDV: -19.1 CM/SEC
BH CV XLRA MEAS LEFT DIST ICA PSV: -54.2 CM/SEC
BH CV XLRA MEAS LEFT ICA RATIO VEL: -70.9 CM/SEC
BH CV XLRA MEAS LEFT ICA/CCA RATIO: -1.1
BH CV XLRA MEAS LEFT MID ICA EDV: -15 CM/SEC
BH CV XLRA MEAS LEFT MID ICA PSV: -68.3 CM/SEC
BH CV XLRA MEAS LEFT PROX CCA EDV: 9.4 CM/SEC
BH CV XLRA MEAS LEFT PROX CCA PSV: 72.7 CM/SEC
BH CV XLRA MEAS LEFT PROX ECA EDV: -7.1 CM/SEC
BH CV XLRA MEAS LEFT PROX ECA PSV: -98.2 CM/SEC
BH CV XLRA MEAS LEFT PROX ICA EDV: -15.8 CM/SEC
BH CV XLRA MEAS LEFT PROX ICA PSV: -70.9 CM/SEC
BH CV XLRA MEAS LEFT PROX SCLA PSV: 104 CM/SEC
BH CV XLRA MEAS LEFT VERTEBRAL A EDV: 9.1 CM/SEC
BH CV XLRA MEAS LEFT VERTEBRAL A PSV: 36.5 CM/SEC
BH CV XLRA MEAS RIGHT CCA RATIO VEL: 59.7 CM/SEC
BH CV XLRA MEAS RIGHT DIST CCA EDV: 10.2 CM/SEC
BH CV XLRA MEAS RIGHT DIST CCA PSV: 59.7 CM/SEC
BH CV XLRA MEAS RIGHT DIST ICA EDV: -21.1 CM/SEC
BH CV XLRA MEAS RIGHT DIST ICA PSV: -80.9 CM/SEC
BH CV XLRA MEAS RIGHT ICA RATIO VEL: -89.1 CM/SEC
BH CV XLRA MEAS RIGHT ICA/CCA RATIO: -1.5
BH CV XLRA MEAS RIGHT MID ICA EDV: -24.6 CM/SEC
BH CV XLRA MEAS RIGHT MID ICA PSV: -89.1 CM/SEC
BH CV XLRA MEAS RIGHT PROX CCA EDV: 12.6 CM/SEC
BH CV XLRA MEAS RIGHT PROX CCA PSV: 66.4 CM/SEC
BH CV XLRA MEAS RIGHT PROX ECA EDV: -10.5 CM/SEC
BH CV XLRA MEAS RIGHT PROX ECA PSV: -164 CM/SEC
BH CV XLRA MEAS RIGHT PROX ICA EDV: -22.9 CM/SEC
BH CV XLRA MEAS RIGHT PROX ICA PSV: -82.7 CM/SEC
BH CV XLRA MEAS RIGHT PROX SCLA PSV: 155 CM/SEC
BH CV XLRA MEAS RIGHT VERTEBRAL A EDV: 7.4 CM/SEC
BH CV XLRA MEAS RIGHT VERTEBRAL A PSV: 41.2 CM/SEC
LEFT ARM BP: NORMAL MMHG
RIGHT ARM BP: NORMAL MMHG

## 2019-05-09 PROCEDURE — 93880 EXTRACRANIAL BILAT STUDY: CPT

## 2019-07-29 DIAGNOSIS — J04.0 LARYNGITIS: ICD-10-CM

## 2019-07-29 DIAGNOSIS — E10.43 TYPE 1 DIABETES MELLITUS WITH AUTONOMIC NEUROPATHY (HCC): ICD-10-CM

## 2019-07-29 DIAGNOSIS — E78.49 OTHER HYPERLIPIDEMIA: ICD-10-CM

## 2019-07-29 DIAGNOSIS — I10 ESSENTIAL HYPERTENSION: Primary | ICD-10-CM

## 2019-07-29 DIAGNOSIS — E89.0 POSTOPERATIVE HYPOTHYROIDISM: ICD-10-CM

## 2019-07-29 RX ORDER — BLOOD-GLUCOSE METER
KIT MISCELLANEOUS
Qty: 400 EACH | Refills: 1 | Status: SHIPPED | OUTPATIENT
Start: 2019-07-29 | End: 2022-03-21 | Stop reason: ALTCHOICE

## 2019-08-02 ENCOUNTER — RESULTS ENCOUNTER (OUTPATIENT)
Dept: ENDOCRINOLOGY | Age: 79
End: 2019-08-02

## 2019-08-02 DIAGNOSIS — E89.0 POSTOPERATIVE HYPOTHYROIDISM: ICD-10-CM

## 2019-08-02 DIAGNOSIS — E10.43 TYPE 1 DIABETES MELLITUS WITH AUTONOMIC NEUROPATHY (HCC): ICD-10-CM

## 2019-08-02 DIAGNOSIS — E78.49 OTHER HYPERLIPIDEMIA: ICD-10-CM

## 2019-08-02 DIAGNOSIS — I10 ESSENTIAL HYPERTENSION: ICD-10-CM

## 2019-08-03 LAB
ALBUMIN SERPL-MCNC: 4.1 G/DL (ref 3.5–5.2)
ALBUMIN/GLOB SERPL: 1.6 G/DL
ALP SERPL-CCNC: 213 U/L (ref 39–117)
ALT SERPL-CCNC: 10 U/L (ref 1–41)
AST SERPL-CCNC: 13 U/L (ref 1–40)
BILIRUB SERPL-MCNC: 0.7 MG/DL (ref 0.2–1.2)
BUN SERPL-MCNC: 26 MG/DL (ref 8–23)
BUN/CREAT SERPL: 22.8 (ref 7–25)
CALCIUM SERPL-MCNC: 9.6 MG/DL (ref 8.6–10.5)
CHLORIDE SERPL-SCNC: 102 MMOL/L (ref 98–107)
CHOLEST SERPL-MCNC: 163 MG/DL (ref 0–200)
CO2 SERPL-SCNC: 30.8 MMOL/L (ref 22–29)
CREAT SERPL-MCNC: 1.14 MG/DL (ref 0.76–1.27)
GLOBULIN SER CALC-MCNC: 2.6 GM/DL
GLUCOSE SERPL-MCNC: 152 MG/DL (ref 65–99)
HBA1C MFR BLD: 5.9 % (ref 4.8–5.6)
HDLC SERPL-MCNC: 60 MG/DL (ref 40–60)
INTERPRETATION: NORMAL
LDLC SERPL CALC-MCNC: 81 MG/DL (ref 0–100)
Lab: NORMAL
POTASSIUM SERPL-SCNC: 4.5 MMOL/L (ref 3.5–5.2)
PROT SERPL-MCNC: 6.7 G/DL (ref 6–8.5)
SODIUM SERPL-SCNC: 142 MMOL/L (ref 136–145)
T4 FREE SERPL-MCNC: 1.75 NG/DL (ref 0.93–1.7)
TRIGL SERPL-MCNC: 111 MG/DL (ref 0–150)
TSH SERPL DL<=0.005 MIU/L-ACNC: 0.11 MIU/ML (ref 0.27–4.2)
VLDLC SERPL CALC-MCNC: 22.2 MG/DL

## 2019-08-08 NOTE — PROGRESS NOTES
Subjective   Griffin Angeles is a 78 y.o. male.     F/u for dm 1, hyperlipidemia, ED/ testing bs 3-4 x day freestyle nitza / last dm eye exam 4/10/19 with dr Acevedo / last dm foot exam today with dr Silver        Patient is a 78-year-old male who came in for diabetes control. He has known diabetes mellitus for 30 years. He has been on Tresiba 8 q AM and Novolog 5 to 6 units with each meal.   Breakfast is between 8-10 AM.  Lunchtime is between 1-4 PM.  Suppertime is around 6 PM.  He denies hypoglycemia in early AM.    He checks his blood sugar 4 times per day.    Glucose sensor records reviewed.  He has few hypoglycemic episodes but did not require glucagon injection.  Hemoglobin A1c done in August 2019 was 5.9%.       His last eye examination was in April 2019. He has nonproliferative diabetic retinopathy and had an embolic plaque on left retinal artery.  He denies numbness, tingling or burning on his hands or feet. He has albuminuria on urine sample taken in 1/19. He is on losartan HCT       He had previous thyroidectomy for unknown thyroid disease. He has been on levothyroxine 100 mcg per day.   TSH done in August 2019 showed a mildly elevated free T4 at 179 mg per DL and low TSH of 0.10.      He has known coronary disease and had previous angioplasty with stent and history of hypertension. He has occasional chest pain but has not used NTG. He had a normal stress test in done in 9/18 by Dr. Kong. He is on Plavix, Toprol, losartan HCT, and Imdur.  He denies SOB.      He has hyperlipidemia and has been on Lipitor 20 mg once a day. He denies any myalgia.    He has lost 7 pounds since March 2019.  Lipid profile done in August 2019 are as follows: Cholesterol 163.  HDL 60.  LDL 81.  Triglycerides 111.     He was seen for gait abnormality last September 2017 by Dr. Mcdonald.  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  He has been seen by neurologist at  "the [facility] and patient is part of the study.  Namenda made him too sleepy and it was discontinued.  He is on donezepil 5 mg daily.  He has difficulty with short-term memory.  Carotid ultrasound done in April 2019 showed bilateral atherosclerotic plaque. He is seeing neurologist Dr. Darren Huang.        He has erectile dysfunction and has been seeing Dr. Love. He had penile implant in 8/16.  He was is on Flomax prn for urinary retention.    He was seen by Dr. Fan Benoit in April 2019 for diarrhea.  He had an EGD which showed preserved villous structure and Brunner's gland hyperplasia.  He was told to have celiac disease and is now on a gluten-free diet.  He had a colonoscopy in May 2019 and had transverse colon polyp with focal adenomatous change without high-grade dysplasia was removed.  Diarrhea has improved.      The following portions of the patient's history were reviewed and updated as appropriate: allergies, current medications, past family history, past medical history, past social history, past surgical history and problem list.    Review of Systems   Constitutional: Negative.    HENT: Negative.    Eyes: Negative.    Respiratory: Negative.    Cardiovascular: Negative.    Gastrointestinal: Negative.    Endocrine: Negative.    Genitourinary: Negative.    Musculoskeletal: Negative.    Skin: Negative.    Allergic/Immunologic: Negative.    Neurological: Negative.    Hematological: Bruises/bleeds easily.   Psychiatric/Behavioral: Positive for sleep disturbance.       Objective      Vitals:    08/09/19 1203   BP: 112/60   BP Location: Right arm   Patient Position: Sitting   Cuff Size: Small Adult   Pulse: 58   SpO2: 94%   Weight: 57.2 kg (126 lb)   Height: 170.2 cm (67.01\")     Physical Exam   Constitutional: He is oriented to person, place, and time. He appears well-developed and well-nourished. No distress.   HENT:   Head: Normocephalic.   Right Ear: External ear normal.   Left Ear: External " ear normal.   Nose: Nose normal.   Mouth/Throat: No oropharyngeal exudate.   Eyes: Conjunctivae and EOM are normal. Right eye exhibits no discharge. Left eye exhibits no discharge.   Neck: Neck supple. No JVD present. No tracheal deviation present. No thyromegaly present.   Cardiovascular: Normal rate, regular rhythm, normal heart sounds and intact distal pulses. Exam reveals no friction rub.   No murmur heard.  Pulmonary/Chest: Effort normal and breath sounds normal. No stridor. No respiratory distress. He has no wheezes. He has no rales.   Abdominal: Soft. Bowel sounds are normal. He exhibits no distension and no mass. There is no tenderness. There is no guarding.   Musculoskeletal: Normal range of motion. He exhibits no edema, tenderness or deformity.   Lymphadenopathy:     He has no cervical adenopathy.   Neurological: He is alert and oriented to person, place, and time. He displays normal reflexes. He exhibits normal muscle tone. Coordination normal.   Skin: Skin is warm and dry. No rash noted. No erythema.   Psychiatric: He has a normal mood and affect. His behavior is normal.     Results Encounter on 08/02/2019   Component Date Value Ref Range Status   • Hemoglobin A1C 08/02/2019 5.90* 4.80 - 5.60 % Final    Comment: Hemoglobin A1C Ranges:  Increased Risk for Diabetes  5.7% to 6.4%  Diabetes                     >= 6.5%  Diabetic Goal                < 7.0%     • Glucose 08/02/2019 152* 65 - 99 mg/dL Final   • BUN 08/02/2019 26* 8 - 23 mg/dL Final   • Creatinine 08/02/2019 1.14  0.76 - 1.27 mg/dL Final   • eGFR Non  Am 08/02/2019 62  >60 mL/min/1.73 Final    Comment: The MDRD GFR formula is only valid for adults with stable  renal function between ages 18 and 70.     • eGFR  Am 08/02/2019 75  >60 mL/min/1.73 Final   • BUN/Creatinine Ratio 08/02/2019 22.8  7.0 - 25.0 Final   • Sodium 08/02/2019 142  136 - 145 mmol/L Final   • Potassium 08/02/2019 4.5  3.5 - 5.2 mmol/L Final   • Chloride 08/02/2019  102  98 - 107 mmol/L Final   • Total CO2 08/02/2019 30.8* 22.0 - 29.0 mmol/L Final   • Calcium 08/02/2019 9.6  8.6 - 10.5 mg/dL Final   • Total Protein 08/02/2019 6.7  6.0 - 8.5 g/dL Final   • Albumin 08/02/2019 4.10  3.50 - 5.20 g/dL Final   • Globulin 08/02/2019 2.6  gm/dL Final   • A/G Ratio 08/02/2019 1.6  g/dL Final   • Total Bilirubin 08/02/2019 0.7  0.2 - 1.2 mg/dL Final   • Alkaline Phosphatase 08/02/2019 213* 39 - 117 U/L Final   • AST (SGOT) 08/02/2019 13  1 - 40 U/L Final   • ALT (SGPT) 08/02/2019 10  1 - 41 U/L Final   • Total Cholesterol 08/02/2019 163  0 - 200 mg/dL Final   • Triglycerides 08/02/2019 111  0 - 150 mg/dL Final   • HDL Cholesterol 08/02/2019 60  40 - 60 mg/dL Final   • VLDL Cholesterol 08/02/2019 22.2  mg/dL Final   • LDL Cholesterol  08/02/2019 81  0 - 100 mg/dL Final   • Free T4 08/02/2019 1.75* 0.93 - 1.70 ng/dL Final   • TSH 08/02/2019 0.110* 0.270 - 4.200 mIU/mL Final   • Interpretation 08/02/2019 Note   Final    Supplemental report is available.   • PDF Image 08/02/2019 Not applicable   Final     Assessment/Plan   Griffin was seen today for diabetes, hyperlipidemia and erectile dysfunction.    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  Decrease levothyroxine to 88 mcg/day.  Continue Plavix, Toprol, Imdur, and losartan HCT per Dr. Kong  Continue Lipitor 20 mg/day.  Continue gluten-free diet  Continue Aricept per Dr. Banks  Smith  Flu vaccine this fall.    Copy of my note sent to Dr. Quevedo, Dr. Darren Huang, Dr. Fan Benoit, Dr. Kong, and Dr. Blue Acevedo    RTC 4 mos

## 2019-08-09 ENCOUNTER — OFFICE VISIT (OUTPATIENT)
Dept: ENDOCRINOLOGY | Age: 79
End: 2019-08-09

## 2019-08-09 VITALS
BODY MASS INDEX: 19.78 KG/M2 | HEART RATE: 58 BPM | WEIGHT: 126 LBS | OXYGEN SATURATION: 94 % | DIASTOLIC BLOOD PRESSURE: 60 MMHG | HEIGHT: 67 IN | SYSTOLIC BLOOD PRESSURE: 112 MMHG

## 2019-08-09 DIAGNOSIS — I10 ESSENTIAL HYPERTENSION: ICD-10-CM

## 2019-08-09 DIAGNOSIS — E10.43 TYPE 1 DIABETES MELLITUS WITH AUTONOMIC NEUROPATHY (HCC): Primary | ICD-10-CM

## 2019-08-09 DIAGNOSIS — E78.49 OTHER HYPERLIPIDEMIA: ICD-10-CM

## 2019-08-09 DIAGNOSIS — I25.10 CORONARY ARTERY DISEASE INVOLVING NATIVE CORONARY ARTERY OF NATIVE HEART WITHOUT ANGINA PECTORIS: ICD-10-CM

## 2019-08-09 DIAGNOSIS — E10.29 TYPE 1 DIABETES MELLITUS WITH PROTEINURIA (HCC): ICD-10-CM

## 2019-08-09 DIAGNOSIS — H34.212 CHOLESTEROL RETINAL EMBOLUS OF LEFT EYE: ICD-10-CM

## 2019-08-09 DIAGNOSIS — K90.0 CELIAC DISEASE: ICD-10-CM

## 2019-08-09 DIAGNOSIS — R80.9 TYPE 1 DIABETES MELLITUS WITH PROTEINURIA (HCC): ICD-10-CM

## 2019-08-09 DIAGNOSIS — E10.649 HYPOGLYCEMIC UNAWARENESS ASSOCIATED WITH TYPE 1 DIABETES MELLITUS: ICD-10-CM

## 2019-08-09 DIAGNOSIS — E89.0 POSTOPERATIVE HYPOTHYROIDISM: ICD-10-CM

## 2019-08-09 PROCEDURE — 99214 OFFICE O/P EST MOD 30 MIN: CPT | Performed by: INTERNAL MEDICINE

## 2019-08-09 RX ORDER — LEVOTHYROXINE SODIUM 88 UG/1
TABLET ORAL
Qty: 90 TABLET | Refills: 2 | Status: SHIPPED | OUTPATIENT
Start: 2019-08-09 | End: 2020-04-30

## 2019-08-09 RX ORDER — MELOXICAM 7.5 MG/1
7.5 TABLET ORAL
COMMUNITY
Start: 2019-06-11 | End: 2019-08-10 | Stop reason: ALTCHOICE

## 2019-10-10 ENCOUNTER — OFFICE VISIT (OUTPATIENT)
Dept: CARDIOLOGY | Facility: CLINIC | Age: 79
End: 2019-10-10

## 2019-10-10 VITALS
HEART RATE: 66 BPM | WEIGHT: 131 LBS | HEIGHT: 67 IN | SYSTOLIC BLOOD PRESSURE: 116 MMHG | DIASTOLIC BLOOD PRESSURE: 71 MMHG | BODY MASS INDEX: 20.56 KG/M2

## 2019-10-10 DIAGNOSIS — I10 ESSENTIAL HYPERTENSION: ICD-10-CM

## 2019-10-10 DIAGNOSIS — I25.10 CORONARY ARTERY DISEASE INVOLVING NATIVE CORONARY ARTERY OF NATIVE HEART WITHOUT ANGINA PECTORIS: Primary | ICD-10-CM

## 2019-10-10 DIAGNOSIS — E78.49 OTHER HYPERLIPIDEMIA: ICD-10-CM

## 2019-10-10 PROCEDURE — 99213 OFFICE O/P EST LOW 20 MIN: CPT | Performed by: INTERNAL MEDICINE

## 2019-10-10 PROCEDURE — 93000 ELECTROCARDIOGRAM COMPLETE: CPT | Performed by: INTERNAL MEDICINE

## 2019-10-10 RX ORDER — LOSARTAN POTASSIUM 50 MG/1
50 TABLET ORAL DAILY
COMMUNITY
Start: 2019-08-29 | End: 2020-08-28

## 2019-10-10 NOTE — PROGRESS NOTES
Subjective:        Griffin Angeles is a 78 y.o. male who here for follow up    CC  The follow-up for the coronary artery disease hypertension hyperlipidemia  HPI    78-year-old male with known history of the coronary artery disease hyperlipidemia and benign essential arterial hypertension here for the follow-up with no complaints of chest pains or tightness no chest no heaviness of the pressure sensation  Problem List Items Addressed This Visit        Cardiovascular and Mediastinum    CAD (coronary artery disease) - Primary    Hyperlipidemia    Essential hypertension    Relevant Medications    losartan (COZAAR) 50 MG tablet        .    The following portions of the patient's history were reviewed and updated as appropriate: allergies, current medications, past family history, past medical history, past social history, past surgical history and problem list.    Past Medical History:   Diagnosis Date   • BPH (benign prostatic hypertrophy)    • CAD (coronary artery disease)    • Hyperlipidemia    • Hyperparathyroidism (CMS/HCC)    • Hypothyroidism    • Lacunar infarction (CMS/HCC)     Bilateral basal ganglia   • Postherpetic neuralgia    • Shingles 06/2018   • Stroke (CMS/[facility])    • Type 2 diabetes mellitus (CMS/[facility])      reports that he quit smoking about 33 years ago. His smoking use included pipe. He has never used smokeless tobacco. He reports that he drinks alcohol. He reports that he does not use drugs.   Family History   Problem Relation Age of Onset   • Diabetes Mother    • Thyroid disease Mother    • Stroke Mother    • Diabetes Father    • Kidney failure Father    • Heart disease Sister        Review of Systems  Constitutional: No wt loss, fever, fatigue  Gastrointestinal: No nausea, abdominal pain  Behavioral/Psych: No insomnia or anxiety   Cardiovascular no chest pains or tightness no chest  Objective:       Physical Exam    /71 (BP Location: Left arm, Patient Position: Sitting)   Pulse 66   Ht 170.2  "cm (67.01\")   Wt 59.4 kg (131 lb)   BMI 20.51 kg/m²   General appearance: No acute changes   Neck: Trachea midline; NECK, supple, no thyromegaly or lymphadenopathy   Lungs: Normal size and shape, normal breath sounds, equal distribution of air, no rales and rhonchi   CV: S1-S2 regular, no murmurs, no rub, no gallop   Abdomen: Soft, non-tender; no masses , no abnormal abdominal sounds   Extremities: No deformity , normal color , no peripheral edema   Skin: Normal temperature, turgor and texture; no rash, ulcers            ECG 12 Lead  Date/Time: 10/10/2019 10:10 AM  Performed by: Moisés Kong MD  Authorized by: Moisés Kong MD   Comparison: compared with previous ECG   Similar to previous ECG  Rhythm: sinus rhythm  ST Flattening: all    Clinical impression: non-specific ECG              Echocardiogram:        Current Outpatient Medications:   •  ACCU-CHEK SOFTCLIX LANCETS lancets, Dx code E10.8 testing bs 3 x day, Disp: 300 each, Rfl: 1  •  atorvastatin (LIPITOR) 20 MG tablet, Take 1 tablet by mouth Daily., Disp: 90 tablet, Rfl: 1  •  Cholecalciferol (VITAMIN D3) 1000 units capsule, Take 1 capsule by mouth Daily., Disp: , Rfl:   •  clopidogrel (PLAVIX) 75 MG tablet, Take 1 tablet by mouth Daily., Disp: 90 tablet, Rfl: 2  •  donepezil (ARICEPT) 5 MG tablet, Take 5 mg by mouth Daily., Disp: , Rfl:   •  escitalopram (LEXAPRO) 5 MG tablet, Take 5 mg by mouth Daily., Disp: , Rfl:   •  FREESTYLE LITE test strip, Dx code E10.8 testing bs 4 x day, Disp: 400 each, Rfl: 1  •  glucagon (GLUCAGON EMERGENCY) 1 MG injection, Inject 1 mg under the skin into the appropriate area as directed 1 (One) Time As Needed for Low Blood Sugar., Disp: 1 kit, Rfl: 5  •  insulin aspart (NOVOLOG FLEXPEN) 100 UNIT/ML solution pen-injector sc pen, Inject 5-7 units at breakfast 5-7 units at lunch and 5-7 units at dinner, Disp: 25 mL, Rfl: 1  •  insulin degludec (TRESIBA FLEXTOUCH) 100 UNIT/ML solution pen-injector injection, " "Inject 8 Units under the skin into the appropriate area as directed Every Morning., Disp: 5 pen, Rfl: 5  •  Insulin Pen Needle (PEN NEEDLES) 32G X 4 MM misc, 1 each 4 (Four) Times a Day., Disp: 400 each, Rfl: 5  •  Insulin Syringe-Needle U-100 (RELION INSULIN SYRINGE) 31G X 15/64\" 0.5 ML misc, Using 3 syringes daily, Disp: 300 each, Rfl: 1  •  isosorbide mononitrate (IMDUR) 30 MG 24 hr tablet, TAKE 1 TABLET EVERY DAY, Disp: 90 tablet, Rfl: 3  •  levothyroxine (SYNTHROID, LEVOTHROID) 88 MCG tablet, 1 tablet daily, Disp: 90 tablet, Rfl: 2  •  losartan (COZAAR) 50 MG tablet, Take 50 mg by mouth Daily., Disp: , Rfl:   •  losartan-hydrochlorothiazide (HYZAAR) 50-12.5 MG per tablet, TAKE 1 TABLET EVERY DAY, Disp: , Rfl:   •  metoprolol succinate XL (TOPROL-XL) 50 MG 24 hr tablet, Take 12.5 mg by mouth Daily., Disp: , Rfl:   •  Multiple Vitamins-Minerals (MULTIVITAMIN WITH MINERALS) tablet tablet, Take 1 tablet by mouth Daily., Disp: , Rfl:   •  niacin 500 MG tablet, Take 500 mg by mouth As Needed. In winter time, Disp: , Rfl:   •  tamsulosin (FLOMAX) 0.4 MG capsule 24 hr capsule, 1 capsule Daily. 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 Coronary artery disease " involving native coronary artery of native heart without angina pectoris I25.10 414.01   2. Essential hypertension I10 401.9   3. Other hyperlipidemia E78.49 272.4     1. Coronary artery disease involving native coronary artery of native heart without angina pectoris  Griffin Angeles with coronary artery disease has no angina pectoris    Risk reduction for the coronary artery disease, controlling the blood pressure, blood sugar management, cholesterol management, exercise, stress management, and proper compliance with medications and follow-up has been discussed    - Adult Transthoracic Echo Complete W/ Cont if Necessary Per Protocol; Future    2. Essential hypertension  Blood pressure under control  - Adult Transthoracic Echo Complete W/ Cont if Necessary Per Protocol; Future    3. Other hyperlipidemia  Continue current medications  - Adult Transthoracic Echo Complete W/ Cont if Necessary Per Protocol; Future  cv stable    See in 1 yr  COUNSELING:    Griffin Eisenberg was given to patient for the following topics: diagnostic results, risk factor reductions, impressions, risks and benefits of treatment options and importance of treatment compliance .       SMOKING COUNSELING:    [unfilled]    Dictated using Dragon dictation

## 2019-11-05 DIAGNOSIS — E10.43 TYPE 1 DIABETES MELLITUS WITH AUTONOMIC NEUROPATHY (HCC): Primary | ICD-10-CM

## 2019-11-05 DIAGNOSIS — E89.0 POSTOPERATIVE HYPOTHYROIDISM: ICD-10-CM

## 2019-11-05 DIAGNOSIS — E78.49 OTHER HYPERLIPIDEMIA: ICD-10-CM

## 2019-11-05 DIAGNOSIS — I10 ESSENTIAL HYPERTENSION: ICD-10-CM

## 2019-11-11 RX ORDER — ISOSORBIDE MONONITRATE 30 MG/1
TABLET, EXTENDED RELEASE ORAL
Qty: 90 TABLET | Refills: 0 | Status: SHIPPED | OUTPATIENT
Start: 2019-11-11 | End: 2020-02-12 | Stop reason: SDUPTHER

## 2019-11-18 ENCOUNTER — RESULTS ENCOUNTER (OUTPATIENT)
Dept: ENDOCRINOLOGY | Age: 79
End: 2019-11-18

## 2019-11-18 DIAGNOSIS — I10 ESSENTIAL HYPERTENSION: ICD-10-CM

## 2019-11-18 DIAGNOSIS — E78.49 OTHER HYPERLIPIDEMIA: ICD-10-CM

## 2019-11-18 DIAGNOSIS — E89.0 POSTOPERATIVE HYPOTHYROIDISM: ICD-10-CM

## 2019-11-18 DIAGNOSIS — E10.43 TYPE 1 DIABETES MELLITUS WITH AUTONOMIC NEUROPATHY (HCC): ICD-10-CM

## 2019-11-19 LAB
ALBUMIN SERPL-MCNC: 4.1 G/DL (ref 3.5–5.2)
ALBUMIN/GLOB SERPL: 1.7 G/DL
ALP SERPL-CCNC: 239 U/L (ref 39–117)
ALT SERPL-CCNC: 14 U/L (ref 1–41)
AST SERPL-CCNC: 14 U/L (ref 1–40)
BILIRUB SERPL-MCNC: 0.8 MG/DL (ref 0.2–1.2)
BUN SERPL-MCNC: 19 MG/DL (ref 8–23)
BUN/CREAT SERPL: 17.3 (ref 7–25)
CALCIUM SERPL-MCNC: 9.4 MG/DL (ref 8.6–10.5)
CHLORIDE SERPL-SCNC: 103 MMOL/L (ref 98–107)
CHOLEST SERPL-MCNC: 129 MG/DL (ref 0–200)
CO2 SERPL-SCNC: 29.8 MMOL/L (ref 22–29)
CREAT SERPL-MCNC: 1.1 MG/DL (ref 0.76–1.27)
GLOBULIN SER CALC-MCNC: 2.4 GM/DL
GLUCOSE SERPL-MCNC: 120 MG/DL (ref 65–99)
HBA1C MFR BLD: 6.4 % (ref 4.8–5.6)
HDLC SERPL-MCNC: 68 MG/DL (ref 40–60)
INTERPRETATION: NORMAL
LDLC SERPL CALC-MCNC: 45 MG/DL (ref 0–100)
Lab: NORMAL
MICROALBUMIN UR-MCNC: 129.4 UG/ML
POTASSIUM SERPL-SCNC: 4.6 MMOL/L (ref 3.5–5.2)
PROT SERPL-MCNC: 6.5 G/DL (ref 6–8.5)
SODIUM SERPL-SCNC: 143 MMOL/L (ref 136–145)
T4 FREE SERPL-MCNC: 1.65 NG/DL (ref 0.93–1.7)
TRIGL SERPL-MCNC: 82 MG/DL (ref 0–150)
TSH SERPL DL<=0.005 MIU/L-ACNC: 1.21 UIU/ML (ref 0.27–4.2)
VLDLC SERPL CALC-MCNC: 16.4 MG/DL

## 2019-12-09 NOTE — PROGRESS NOTES
Subjective   Griffin Angeles is a 79 y.o. male.     F/u for dm 1, hyperlipidemia, ED/ testing bs 3-4 x day / last dm eye exam 4/10/19 with dr hernández / last dm foot exam 8/9/19 with dr Silver / flu vaccine      Patient is a 79-year-old male who came in for diabetes control. He has known diabetes mellitus for 30 years. He has been on Tresiba 8 q AM and Novolog 5 to 6 units with each meal.   Breakfast is between 8-10 AM.  Lunchtime is between 1-4 PM.  Suppertime is around 6 PM.  He denies hypoglycemia.    He checks his blood sugar 4 times per day.    He is using Freestyle nitza sensors but did not bring his readings.  Hemoglobin A1c done in 11/19 was 6.4%.      His last eye examination was in April 2019. He has nonproliferative diabetic retinopathy and had an embolic plaque on left retinal artery.  He denies numbness, tingling or burning on his hands or feet. He has albuminuria on urine sample taken in 1/19. He is on losartan 25 mg 1/2 tab/day      He had previous thyroidectomy for unknown thyroid disease. He has been on levothyroxine 88 mcg per day.   TSH done in 2019 is normal at 1.21 with a normal free T4 at 1.65 ng per DL.     He has known coronary disease and had previous angioplasty with stent and history of hypertension. He has occasional chest pain but has not used NTG. He had a normal stress test in done in 9/18 by Dr. Kong. He is on Plavix, Toprol, losartan, and Imdur.  He denies SOB.      He has hyperlipidemia and has been on Lipitor 20 mg once a day. He denies any myalgia.    He has gained 5 lbs since 8/19     He was seen for gait abnormality last September 2017 by Dr. Mcdonald.  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  He is seeing neurologist Dr. Darren Huang at the [facility] and patient is part of the study.  Namenda made him too sleepy and it was discontinued.  He is on donezepil 5 mg daily.  He has difficulty with  "short-term memory.  Carotid ultrasound done in April 2019 showed bilateral atherosclerotic plaque.       He has erectile dysfunction and has been seeing Dr. Love. He had penile implant in 8/16.  He was is on Flomax prn for urinary retention.     He was seen by Dr. Fan Benoit in April 2019 for diarrhea.  He had an EGD which showed preserved villous structure and Brunner's gland hyperplasia.  He is off the gluten-free diet.  He has diarrhea once a week.  He had a colonoscopy in May 2019 and had transverse colon polyp with focal adenomatous change without high-grade dysplasia was removed.     He has not had flu vac yet.  He has completed his pneumococcal vaccination     The following portions of the patient's history were reviewed and updated as appropriate: allergies, current medications, past family history, past medical history, past social history, past surgical history and problem list.    Review of Systems   Constitutional: Negative.    HENT: Negative.    Eyes: Negative.    Respiratory: Negative.    Cardiovascular: Negative.    Gastrointestinal: Negative.    Endocrine: Negative.    Genitourinary: Negative.    Musculoskeletal: Negative.    Neurological: Negative.    Hematological: Negative.    Psychiatric/Behavioral: Negative.      Objective      Vitals:    12/10/19 1317   BP: 152/70   BP Location: Right arm   Patient Position: Sitting   Cuff Size: Small Adult   Pulse: 65   SpO2: 99%   Weight: 59.4 kg (131 lb)   Height: 170.2 cm (67.01\")     Physical Exam   Constitutional: He is oriented to person, place, and time. He appears well-developed and well-nourished. No distress.   HENT:   Head: Normocephalic.   Right Ear: External ear normal.   Left Ear: External ear normal.   Nose: Nose normal.   Mouth/Throat: Oropharynx is clear and moist. No oropharyngeal exudate.   Eyes: Conjunctivae and EOM are normal. Right eye exhibits no discharge. Left eye exhibits no discharge. No scleral icterus.   Neck: Neck supple. No JVD " present. No tracheal deviation present. No thyromegaly present.   Cardiovascular: Normal rate, regular rhythm, normal heart sounds and intact distal pulses. Exam reveals no gallop and no friction rub.   No murmur heard.  Pulmonary/Chest: Effort normal and breath sounds normal. No stridor. No respiratory distress. He has no wheezes. He has no rales. He exhibits no tenderness.   Abdominal: Soft. Bowel sounds are normal. He exhibits no distension and no mass. There is no tenderness. There is no guarding.   Musculoskeletal: Normal range of motion. He exhibits no edema, tenderness or deformity.   Lymphadenopathy:     He has no cervical adenopathy.   Neurological: He is alert and oriented to person, place, and time. He displays normal reflexes.   Skin: Skin is warm and dry. No rash noted. No erythema. No pallor.   Psychiatric: He has a normal mood and affect. His behavior is normal.     Results Encounter on 11/18/2019   Component Date Value Ref Range Status   • Glucose 11/18/2019 120* 65 - 99 mg/dL Final   • BUN 11/18/2019 19  8 - 23 mg/dL Final   • Creatinine 11/18/2019 1.10  0.76 - 1.27 mg/dL Final   • eGFR Non  Am 11/18/2019 65  >60 mL/min/1.73 Final    Comment: The MDRD GFR formula is only valid for adults with stable  renal function between ages 18 and 70.     • eGFR  Am 11/18/2019 79  >60 mL/min/1.73 Final   • BUN/Creatinine Ratio 11/18/2019 17.3  7.0 - 25.0 Final   • Sodium 11/18/2019 143  136 - 145 mmol/L Final   • Potassium 11/18/2019 4.6  3.5 - 5.2 mmol/L Final   • Chloride 11/18/2019 103  98 - 107 mmol/L Final   • Total CO2 11/18/2019 29.8* 22.0 - 29.0 mmol/L Final   • Calcium 11/18/2019 9.4  8.6 - 10.5 mg/dL Final   • Total Protein 11/18/2019 6.5  6.0 - 8.5 g/dL Final   • Albumin 11/18/2019 4.10  3.50 - 5.20 g/dL Final   • Globulin 11/18/2019 2.4  gm/dL Final   • A/G Ratio 11/18/2019 1.7  g/dL Final   • Total Bilirubin 11/18/2019 0.8  0.2 - 1.2 mg/dL Final   • Alkaline Phosphatase 11/18/2019 239*  39 - 117 U/L Final   • AST (SGOT) 11/18/2019 14  1 - 40 U/L Final   • ALT (SGPT) 11/18/2019 14  1 - 41 U/L Final   • Total Cholesterol 11/18/2019 129  0 - 200 mg/dL Final   • Triglycerides 11/18/2019 82  0 - 150 mg/dL Final   • HDL Cholesterol 11/18/2019 68* 40 - 60 mg/dL Final   • VLDL Cholesterol 11/18/2019 16.4  mg/dL Final   • LDL Cholesterol  11/18/2019 45  0 - 100 mg/dL Final   • Hemoglobin A1C 11/18/2019 6.40* 4.80 - 5.60 % Final    Comment: Hemoglobin A1C Ranges:  Increased Risk for Diabetes  5.7% to 6.4%  Diabetes                     >= 6.5%  Diabetic Goal                < 7.0%     • Free T4 11/18/2019 1.65  0.93 - 1.70 ng/dL Final   • TSH 11/18/2019 1.210  0.270 - 4.200 uIU/mL Final   • Interpretation 11/18/2019 Note   Final    Supplemental report is available.   • PDF Image 11/18/2019 Not applicable   Final   • Microalbumin, Urine 11/18/2019 129.4  Not Estab. ug/mL Final     Assessment/Plan   Griffin was seen today for diabetes, hyperlipidemia and erectile dysfunction.    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  Continue levothyroxine 88 mcg/day.  Continue Toprol, losartan, and Plavix per Dr. Kong.    Continue Lipitor.  Continue Aricept.     Copy of my note sent to Dr. Quevedo.    RTC 4 mos.

## 2019-12-10 ENCOUNTER — OFFICE VISIT (OUTPATIENT)
Dept: ENDOCRINOLOGY | Age: 79
End: 2019-12-10

## 2019-12-10 VITALS
BODY MASS INDEX: 20.56 KG/M2 | SYSTOLIC BLOOD PRESSURE: 152 MMHG | HEART RATE: 65 BPM | OXYGEN SATURATION: 99 % | WEIGHT: 131 LBS | HEIGHT: 67 IN | DIASTOLIC BLOOD PRESSURE: 70 MMHG

## 2019-12-10 DIAGNOSIS — R80.9 TYPE 1 DIABETES MELLITUS WITH PROTEINURIA (HCC): ICD-10-CM

## 2019-12-10 DIAGNOSIS — E10.43 TYPE 1 DIABETES MELLITUS WITH AUTONOMIC NEUROPATHY (HCC): ICD-10-CM

## 2019-12-10 DIAGNOSIS — E10.649 HYPOGLYCEMIC UNAWARENESS ASSOCIATED WITH TYPE 1 DIABETES MELLITUS: ICD-10-CM

## 2019-12-10 DIAGNOSIS — E10.8 TYPE 1 DIABETES MELLITUS WITH COMPLICATIONS (HCC): Primary | ICD-10-CM

## 2019-12-10 DIAGNOSIS — E89.0 POSTOPERATIVE HYPOTHYROIDISM: ICD-10-CM

## 2019-12-10 DIAGNOSIS — I10 ESSENTIAL HYPERTENSION: ICD-10-CM

## 2019-12-10 DIAGNOSIS — E10.29 TYPE 1 DIABETES MELLITUS WITH PROTEINURIA (HCC): ICD-10-CM

## 2019-12-10 DIAGNOSIS — E78.5 HYPERLIPIDEMIA, UNSPECIFIED HYPERLIPIDEMIA TYPE: ICD-10-CM

## 2019-12-10 DIAGNOSIS — I25.10 CORONARY ARTERY DISEASE INVOLVING NATIVE CORONARY ARTERY OF NATIVE HEART WITHOUT ANGINA PECTORIS: ICD-10-CM

## 2019-12-10 PROCEDURE — 99214 OFFICE O/P EST MOD 30 MIN: CPT | Performed by: INTERNAL MEDICINE

## 2019-12-10 RX ORDER — DONEPEZIL HYDROCHLORIDE 10 MG/1
TABLET, FILM COATED ORAL
Refills: 2 | COMMUNITY
Start: 2019-11-24 | End: 2019-12-10 | Stop reason: SDUPTHER

## 2019-12-10 RX ORDER — METOPROLOL SUCCINATE 25 MG/1
25 TABLET, EXTENDED RELEASE ORAL DAILY
Refills: 3 | COMMUNITY
Start: 2019-11-12

## 2020-02-12 RX ORDER — ISOSORBIDE MONONITRATE 30 MG/1
30 TABLET, EXTENDED RELEASE ORAL DAILY
Qty: 90 TABLET | Refills: 2 | Status: SHIPPED | OUTPATIENT
Start: 2020-02-12 | End: 2020-10-19

## 2020-02-12 RX ORDER — CLOPIDOGREL BISULFATE 75 MG/1
75 TABLET ORAL DAILY
Qty: 90 TABLET | Refills: 2 | Status: SHIPPED | OUTPATIENT
Start: 2020-02-12 | End: 2020-10-19

## 2020-02-14 ENCOUNTER — HOSPITAL ENCOUNTER (EMERGENCY)
Facility: HOSPITAL | Age: 80
Discharge: HOME | End: 2020-02-14
Attending: EMERGENCY MEDICINE | Admitting: EMERGENCY MEDICINE

## 2020-02-14 ENCOUNTER — APPOINTMENT (OUTPATIENT)
Dept: CT IMAGING | Facility: HOSPITAL | Age: 80
End: 2020-02-14

## 2020-02-14 ENCOUNTER — APPOINTMENT (OUTPATIENT)
Dept: GENERAL RADIOLOGY | Facility: HOSPITAL | Age: 80
End: 2020-02-14

## 2020-02-14 VITALS
RESPIRATION RATE: 16 BRPM | SYSTOLIC BLOOD PRESSURE: 171 MMHG | WEIGHT: 120 LBS | OXYGEN SATURATION: 100 % | HEIGHT: 67 IN | BODY MASS INDEX: 18.83 KG/M2 | DIASTOLIC BLOOD PRESSURE: 66 MMHG | TEMPERATURE: 96.9 F | HEART RATE: 66 BPM

## 2020-02-14 DIAGNOSIS — S06.0X9A CONCUSSION WITH LOSS OF CONSCIOUSNESS, INITIAL ENCOUNTER: ICD-10-CM
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
## 2020-02-14 DIAGNOSIS — G91.2 NORMAL PRESSURE HYDROCEPHALUS (HCC): ICD-10-CM

## 2020-02-14 LAB
ALBUMIN SERPL-MCNC: 3.8 G/DL (ref 3.5–5.2)
ALBUMIN/GLOB SERPL: 1.5 G/DL
ALP SERPL-CCNC: 225 U/L (ref 39–117)
ALT SERPL W P-5'-P-CCNC: 12 U/L (ref 1–41)
ANION GAP SERPL CALCULATED.3IONS-SCNC: 8.4 MMOL/L (ref 5–15)
AST SERPL-CCNC: 13 U/L (ref 1–40)
BACTERIA UR QL AUTO: NORMAL /HPF
BASOPHILS # BLD AUTO: 0.03 10*3/MM3 (ref 0–0.2)
BASOPHILS NFR BLD AUTO: 0.3 % (ref 0–1.5)
BILIRUB SERPL-MCNC: 0.4 MG/DL (ref 0.2–1.2)
BILIRUB UR QL STRIP: NEGATIVE
BUN BLD-MCNC: 21 MG/DL (ref 8–23)
BUN/CREAT SERPL: 19.6 (ref 7–25)
CALCIUM SPEC-SCNC: 9.3 MG/DL (ref 8.6–10.5)
CHLORIDE SERPL-SCNC: 102 MMOL/L (ref 98–107)
CLARITY UR: CLEAR
CO2 SERPL-SCNC: 29.6 MMOL/L (ref 22–29)
COLOR UR: YELLOW
CREAT BLD-MCNC: 1.07 MG/DL (ref 0.76–1.27)
DEPRECATED RDW RBC AUTO: 44 FL (ref 37–54)
EOSINOPHIL # BLD AUTO: 0.1 10*3/MM3 (ref 0–0.4)
EOSINOPHIL NFR BLD AUTO: 1.1 % (ref 0.3–6.2)
ERYTHROCYTE [DISTWIDTH] IN BLOOD BY AUTOMATED COUNT: 12.8 % (ref 12.3–15.4)
GFR SERPL CREATININE-BSD FRML MDRD: 67 ML/MIN/1.73
GLOBULIN UR ELPH-MCNC: 2.5 GM/DL
GLUCOSE BLD-MCNC: 175 MG/DL (ref 65–99)
GLUCOSE BLDC GLUCOMTR-MCNC: 105 MG/DL (ref 70–130)
GLUCOSE BLDC GLUCOMTR-MCNC: 209 MG/DL (ref 70–130)
GLUCOSE UR STRIP-MCNC: NEGATIVE MG/DL
HCT VFR BLD AUTO: 44.4 % (ref 37.5–51)
HGB BLD-MCNC: 14.6 G/DL (ref 13–17.7)
HGB UR QL STRIP.AUTO: NEGATIVE
HYALINE CASTS UR QL AUTO: NORMAL /LPF
IMM GRANULOCYTES # BLD AUTO: 0.02 10*3/MM3 (ref 0–0.05)
IMM GRANULOCYTES NFR BLD AUTO: 0.2 % (ref 0–0.5)
KETONES UR QL STRIP: NEGATIVE
LEUKOCYTE ESTERASE UR QL STRIP.AUTO: NEGATIVE
LYMPHOCYTES # BLD AUTO: 1.52 10*3/MM3 (ref 0.7–3.1)
LYMPHOCYTES NFR BLD AUTO: 16.4 % (ref 19.6–45.3)
MCH RBC QN AUTO: 30.9 PG (ref 26.6–33)
MCHC RBC AUTO-ENTMCNC: 32.9 G/DL (ref 31.5–35.7)
MCV RBC AUTO: 93.9 FL (ref 79–97)
MONOCYTES # BLD AUTO: 0.83 10*3/MM3 (ref 0.1–0.9)
MONOCYTES NFR BLD AUTO: 9 % (ref 5–12)
NEUTROPHILS # BLD AUTO: 6.76 10*3/MM3 (ref 1.7–7)
NEUTROPHILS NFR BLD AUTO: 73 % (ref 42.7–76)
NITRITE UR QL STRIP: NEGATIVE
NRBC BLD AUTO-RTO: 0.1 /100 WBC (ref 0–0.2)
PH UR STRIP.AUTO: 5.5 [PH] (ref 5–8)
PLATELET # BLD AUTO: 160 10*3/MM3 (ref 140–450)
PMV BLD AUTO: 12.9 FL (ref 6–12)
POTASSIUM BLD-SCNC: 4.7 MMOL/L (ref 3.5–5.2)
PROT SERPL-MCNC: 6.3 G/DL (ref 6–8.5)
PROT UR QL STRIP: ABNORMAL
RBC # BLD AUTO: 4.73 10*6/MM3 (ref 4.14–5.8)
RBC # UR: NORMAL /HPF
REF LAB TEST METHOD: NORMAL
SODIUM BLD-SCNC: 140 MMOL/L (ref 136–145)
SP GR UR STRIP: 1.02 (ref 1–1.03)
SQUAMOUS #/AREA URNS HPF: NORMAL /HPF
UROBILINOGEN UR QL STRIP: ABNORMAL
WBC NRBC COR # BLD: 9.26 10*3/MM3 (ref 3.4–10.8)
WBC UR QL AUTO: NORMAL /HPF

## 2020-02-14 PROCEDURE — 82962 GLUCOSE BLOOD TEST: CPT

## 2020-02-14 PROCEDURE — 93005 ELECTROCARDIOGRAM TRACING: CPT | Performed by: PHYSICIAN ASSISTANT

## 2020-02-14 PROCEDURE — 80053 COMPREHEN METABOLIC PANEL: CPT | Performed by: PHYSICIAN ASSISTANT

## 2020-02-14 PROCEDURE — 72072 X-RAY EXAM THORAC SPINE 3VWS: CPT

## 2020-02-14 PROCEDURE — 85025 COMPLETE CBC W/AUTO DIFF WBC: CPT | Performed by: PHYSICIAN ASSISTANT

## 2020-02-14 PROCEDURE — 70450 CT HEAD/BRAIN W/O DYE: CPT

## 2020-02-14 PROCEDURE — 81001 URINALYSIS AUTO W/SCOPE: CPT | Performed by: PHYSICIAN ASSISTANT

## 2020-02-14 PROCEDURE — 72125 CT NECK SPINE W/O DYE: CPT

## 2020-02-14 PROCEDURE — 73030 X-RAY EXAM OF SHOULDER: CPT

## 2020-02-14 PROCEDURE — 99284 EMERGENCY DEPT VISIT MOD MDM: CPT

## 2020-02-14 PROCEDURE — 93010 ELECTROCARDIOGRAM REPORT: CPT | Performed by: INTERNAL MEDICINE

## 2020-02-14 PROCEDURE — 36415 COLL VENOUS BLD VENIPUNCTURE: CPT | Performed by: PHYSICIAN ASSISTANT

## 2020-02-14 NOTE — ED TRIAGE NOTES
Pt's wife reports pt fell while trying to get out of car, positive LOC. Did hit head, no blood thinners. Pt arrives aaox4, abc's intact, NAD noted at this time.

## 2020-02-14 NOTE — ED PROVIDER NOTES
EMERGENCY DEPARTMENT ENCOUNTER    Room Number:  36/36  PCP: Jeronimo Quevedo MD  Historian: pt      HPI:  Chief Complaint: Fall  Context: Griffin Angeles is a 79 y.o. male who presents to the ED c/o fall earlier today while trying to get out of the car. Pt reports he lost balance and fell, hitting his head at the [employer] parking lot with a brief loss of consciousness. Pt is c/o L cervical neck pain and L shoulder pain. Per spouse, pt was confused for about 45 minutes after the fall.    Pt has a known normal pressure hydrocephalus.         MEDICAL RECORD REVIEW          ALLERGIES  Patient has no known allergies.    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) 11/14/2016   • Type 1 diabetes mellitus with proteinuria (CMS/[facility]) 05/15/2017   • Chronic nonspecific colitis 09/21/2017   • Multiple lacunar infarcts (CMS/[facility]) 01/31/2018   • Type 1 diabetes mellitus with complications (CMS/[facility]) 01/31/2018   • 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   • Celiac disease 08/09/2019     Resolved Ambulatory Problems     Diagnosis Date Noted   • No Resolved Ambulatory Problems     Past Medical History:   Diagnosis Date   • BPH (benign prostatic hypertrophy)    • Hyperparathyroidism (CMS/[facility])    • Hypertension    • Hypothyroidism    • Lacunar infarction (CMS/[facility])    • Shingles 06/2018   • Stroke (CMS/[facility])    • Type 2 diabetes mellitus (CMS/[facility])        PAST SURGICAL HISTORY  Past  Surgical History:   Procedure Laterality Date   • CATARACT EXTRACTION Bilateral 2017   • COLONOSCOPY  2017    Chronic inflammation.  Dr. Benoit.  [facility]   • CORONARY ANGIOPLASTY WITH STENT PLACEMENT     • HEMORRHOIDECTOMY     • PARATHYROIDECTOMY     • PENILE PROSTHESIS IMPLANT      Dr. [facility]   • RETINAL LASER PROCEDURE Right    • TURP / TRANSURETHRAL INCISION / DRAINAGE PROSTATE         FAMILY HISTORY  Family History   Problem Relation Age of Onset   • Diabetes Mother    • Thyroid disease Mother    • Stroke Mother    • Diabetes Father    • Kidney failure Father    • Heart disease Sister        SOCIAL HISTORY  Social History     Socioeconomic History   • Marital status:      Spouse name: Not on file   • Number of children: Not on file   • Years of education: Not on file   • Highest education level: Not on file   Tobacco Use   • Smoking status: Former Smoker     Types: Pipe     Last attempt to quit:      Years since quittin.1   • Smokeless tobacco: Never Used   • Tobacco comment: SMOKED PIPE    Substance and Sexual Activity   • Alcohol use: Yes     Comment: SOCIALLY    • Drug use: No   • Sexual activity: Defer           REVIEW OF SYSTEMS  Review of Systems   Constitutional: Negative for fatigue.   HENT: Negative for congestion.    Respiratory: Negative for cough and shortness of breath.    Gastrointestinal: Negative for vomiting.   Endocrine: Negative for polyuria.   Musculoskeletal: Positive for arthralgias (L shoulder and L upper back pain) and back pain.   Skin: Negative for color change.   Neurological: Negative for syncope and headaches.        Head injury; denies headache   Psychiatric/Behavioral: Negative for agitation.           PHYSICAL EXAM  ED Triage Vitals   Temp Heart Rate Resp BP SpO2   20 1421 20 1421 20 1421 20 1550 20 1421   96.9 °F (36.1 °C) 64 17 152/74 99 %      Temp src Heart Rate Source Patient Position BP  Location FiO2 (%)   02/14/20 1421 -- -- -- --   Tympanic         Physical Exam   Constitutional: He is well-developed, well-nourished, and in no distress. Vital signs are normal. He appears not cachectic.   HENT:   Head: Normocephalic. Head is with abrasion (2x4 cm superficial hematoma) and with contusion.   Eyes: Pupils are equal, round, and reactive to light. Conjunctivae, EOM and lids are normal. Lids are everted and swept, no foreign bodies found.   Neck: Normal range of motion. No thyroid mass present.   Cardiovascular: Normal rate, S1 normal and S2 normal. Exam reveals no friction rub.   Pulmonary/Chest: Effort normal and breath sounds normal.   Abdominal: Soft. Normal appearance.   Neurological: He is alert.   Skin: Skin is warm and dry. No rash noted. He is not diaphoretic.   Psychiatric: Affect and judgment normal.           LAB RESULTS  Recent Results (from the past 24 hour(s))   POC Glucose Once    Collection Time: 02/14/20  5:36 PM   Result Value Ref Range    Glucose 209 (H) 70 - 130 mg/dL   Urinalysis With Microscopic If Indicated (No Culture) - Urine, Clean Catch    Collection Time: 02/14/20  5:52 PM   Result Value Ref Range    Color, UA Yellow Yellow, Straw    Appearance, UA Clear Clear    pH, UA 5.5 5.0 - 8.0    Specific Gravity, UA 1.019 1.005 - 1.030    Glucose, UA Negative Negative    Ketones, UA Negative Negative    Bilirubin, UA Negative Negative    Blood, UA Negative Negative    Protein,  mg/dL (2+) (A) Negative    Leuk Esterase, UA Negative Negative    Nitrite, UA Negative Negative    Urobilinogen, UA 0.2 E.U./dL 0.2 - 1.0 E.U./dL   Urinalysis, Microscopic Only - Urine, Clean Catch    Collection Time: 02/14/20  5:52 PM   Result Value Ref Range    RBC, UA 0-2 None Seen, 0-2 /HPF    WBC, UA 0-2 None Seen, 0-2 /HPF    Bacteria, UA None Seen None Seen /HPF    Squamous Epithelial Cells, UA 0-2 None Seen, 0-2 /HPF    Hyaline Casts, UA 0-2 None Seen /LPF    Methodology Automated Microscopy     Comprehensive Metabolic Panel    Collection Time: 02/14/20  6:45 PM   Result Value Ref Range    Glucose 175 (H) 65 - 99 mg/dL    BUN 21 8 - 23 mg/dL    Creatinine 1.07 0.76 - 1.27 mg/dL    Sodium 140 136 - 145 mmol/L    Potassium 4.7 3.5 - 5.2 mmol/L    Chloride 102 98 - 107 mmol/L    CO2 29.6 (H) 22.0 - 29.0 mmol/L    Calcium 9.3 8.6 - 10.5 mg/dL    Total Protein 6.3 6.0 - 8.5 g/dL    Albumin 3.80 3.50 - 5.20 g/dL    ALT (SGPT) 12 1 - 41 U/L    AST (SGOT) 13 1 - 40 U/L    Alkaline Phosphatase 225 (H) 39 - 117 U/L    Total Bilirubin 0.4 0.2 - 1.2 mg/dL    eGFR Non African Amer 67 >60 mL/min/1.73    Globulin 2.5 gm/dL    A/G Ratio 1.5 g/dL    BUN/Creatinine Ratio 19.6 7.0 - 25.0    Anion Gap 8.4 5.0 - 15.0 mmol/L   CBC Auto Differential    Collection Time: 02/14/20  6:45 PM   Result Value Ref Range    WBC 9.26 3.40 - 10.80 10*3/mm3    RBC 4.73 4.14 - 5.80 10*6/mm3    Hemoglobin 14.6 13.0 - 17.7 g/dL    Hematocrit 44.4 37.5 - 51.0 %    MCV 93.9 79.0 - 97.0 fL    MCH 30.9 26.6 - 33.0 pg    MCHC 32.9 31.5 - 35.7 g/dL    RDW 12.8 12.3 - 15.4 %    RDW-SD 44.0 37.0 - 54.0 fl    MPV 12.9 (H) 6.0 - 12.0 fL    Platelets 160 140 - 450 10*3/mm3    Neutrophil % 73.0 42.7 - 76.0 %    Lymphocyte % 16.4 (L) 19.6 - 45.3 %    Monocyte % 9.0 5.0 - 12.0 %    Eosinophil % 1.1 0.3 - 6.2 %    Basophil % 0.3 0.0 - 1.5 %    Immature Grans % 0.2 0.0 - 0.5 %    Neutrophils, Absolute 6.76 1.70 - 7.00 10*3/mm3    Lymphocytes, Absolute 1.52 0.70 - 3.10 10*3/mm3    Monocytes, Absolute 0.83 0.10 - 0.90 10*3/mm3    Eosinophils, Absolute 0.10 0.00 - 0.40 10*3/mm3    Basophils, Absolute 0.03 0.00 - 0.20 10*3/mm3    Immature Grans, Absolute 0.02 0.00 - 0.05 10*3/mm3    nRBC 0.1 0.0 - 0.2 /100 WBC       I ordered the above labs and reviewed the results        RADIOLOGY  XR Shoulder 2+ View Left   Final Result       As described.               This report was finalized on 2/14/2020 8:05 PM by Dr. Rohit Flores M.D.          XR Spine Thoracic 3  View   Final Result       As described.       This report was finalized on 2/14/2020 8:07 PM by Dr. Rohit Flores M.D.          CT Cervical Spine Without Contrast   Final Result   No acute fracture is seen in the cervical spine.  There is   mild cervical spondylosis as described.       Radiation dose reduction techniques were utilized, including automated   exposure control and exposure modulation based on body size.       This report was finalized on 2/14/2020 7:47 PM by Dr. Tomasz Urena M.D.          CT Head Without Contrast   Final Result   1. No acute abdominal is seen. Since prior head CT 03/05/2017 there has   been progression of periventricular and subcortical white matter   low-density in the cerebral hemispheres compatible with progression of   moderate small vessel disease. There are multiple old lacunar infarcts   involving the body of the right caudate nucleus and one in the anterior   limb of the right internal capsule and the right and left putamen as   described and they are unchanged.   2. There is moderate enlargement of the lateral, third and fourth   ventricles. They are unchanged to equivocally slightly larger than they   were on prior head CT 3 years ago on 03/05/2017 may be due to   progressive central volume loss or atrophy, benign ventriculomegaly   versus hydrocephalus and correlate clinically. The remainder of the head   CT is normal with no acute skull fracture or intracranial hemorrhage   seen.       Radiation dose reduction techniques were utilized, including automated   exposure control and exposure modulation based on body size.       This report was finalized on 2/14/2020 7:49 PM by Dr. Tomasz Urena M.D.              I ordered the above noted radiological studies and reviewed the images on the PACS system.  Spoke with Dr. Urena (radiologist) regarding CT/MRI scan results        MEDICATIONS GIVEN IN ER  Medications - No data to  "display                PROCEDURES  Procedures          PROGRESS AND CONSULTS    Progress Notes:       Pt care turned over to Dr. Ang.      Reviewed pt's history and workup with Dr. Ang (ER physician). After a bedside evaluation, Dr. Ang agrees with the plan of care.          Disposition vitals:  /72   Pulse 57   Temp 96.9 °F (36.1 °C) (Tympanic)   Resp 14   Ht 170.2 cm (67\")   Wt 54.4 kg (120 lb)   SpO2 99%   BMI 18.79 kg/m²           DIAGNOSIS  Final diagnoses:   Syncope, unspecified syncope type   Normal pressure hydrocephalus (CMS/HCC)           DISPOSITION  ADMISSION    Discussed treatment plan and reason for admission with pt/family and admitting physician.  Pt/family voiced understanding of the plan for admission for further testing/treatment as needed.                 Documentation assistance provided by kellen Kasper for Ms. Angeli Reece PA-C.  Information recorded by the scribdanyel was done at my direction and has been verified and validated by me.       Patti Kasper  02/14/20 1858       Angeli Reece PA-C  02/16/20 0952    "

## 2020-02-15 NOTE — ED PROVIDER NOTES
Pt presents to the ED c/o mechanical fall that occurred earlier today while he was trying to get out of the car. Pt states he lost balance and fell. He did hit his head and have LOC. Pt confirms L sided neck pain and L shoulder pain. Per wife, pt was confused and very repetitive. He had no recollection of the event. Pt is a diabetic. Hx of CAD, hyperlipidemia, HTN, lacunar infarction, and stroke. There are no other complaints at this time.      On exam:  Alert, but confused. NAD, PERRL, moist mucous membranes. Hematoma to occiput. Heart is RRR at 60, lungs are CTAB. Abd is soft, non tender, non distended, bowel sounds positive. No pedal edema. Minimal C-spine tenderness. Normocephalic, atraumatic. Non-focal neuro exam.      EKG    EKG time: 1749  Rhythm/Rate: sinus bradycardia, 55  No Acute Ischemia  Non-Specific ST-T changes    No prior for comparison.    Interpreted Contemporaneously by me.  Independently viewed by me      Progress notes:    Reviewed lab work and imaging. CT head revealed progression of periventricular and subcortical white matter low-density in the cerebral hemispheres compatible with progression of moderate small vessel disease. There are multiple old lacunar infarcts  involving the body of the right caudate nucleus and one in the anterior limb of the right internal capsule and the right and left putamen. There is also questionable NPH. Remainder of imaging was negative acute.    2018  I spoke with patient and family about patient's normal pressure hydrocephalus.  They state that he is followed by someone in [place] who does not feel he needs a  shunt.  Placed a call to [facility] for admission.    2033  Discussed pt case with Dr. Diallo ([facility]) who does not think pt needs admission. He suggests ambulating pt and seeing how he does. If he is stable then he can follow up with neurosurgery as outpatient.     2053  Rechecked pt. Pt is resting comfortably. Notified pt of my discussion with Dr. Diallo.  Discussed the plan to ambulate pt and see how he does. Pt understands and agrees with the plan, all questions answered.    2115  Per ER tech, pt ambulated well w/ no issues.    2117  Rechecked pt. Pt is resting comfortably. He states he felt good while ambulating. Per wife, pt does have a walker available at home, but he does not use it. Advised pt to follow up with PCP as needed and RTER if any new/worsening symptoms develop. Discussed the plan to discharge. Pt understands and agrees with the plan, all questions answered.      Disposition:  DISCHARGE    Patient discharged in stable condition.    Reviewed implications of results, diagnosis, meds, responsibility to follow up, warning signs and symptoms of possible worsening, potential complications and reasons to return to ER.    Patient/Family voiced understanding of above instructions.    Discussed plan for discharge, as there is no emergent indication for admission. Patient referred to primary care provider for BP management due to today's BP. Pt/family is agreeable and understands need for follow up and repeat testing.  Pt is aware that discharge does not mean that nothing is wrong but it indicates no emergency is present that requires admission and they must continue care with follow-up as given below or physician of their choice.     FOLLOW-UP  Rl Tineo MD  [address] [address]  [phone]    In 1 week  For recheck         Medication List      ASK your doctor about these medications    donepezil 5 MG tablet  Commonly known as:  ARICEPT  Ask about: Should I take this medication?            Final diagnoses:   Normal pressure hydrocephalus (CMS/HCC)   Concussion with loss of consciousness, initial encounter   Fall, initial encounter       MD ATTESTATION NOTE      The LIONEL and I have discussed this patient's history, physical exam, and treatment plan.  I have reviewed the documentation and personally had a face to face interaction  with the patient. I affirm the documentation and agree with the treatment and plan.  The attached note describes my personal findings.      Documentation assistance provided by kellen Wu for Dr. Ang. Information recorded by the scribe was done at my direction and has been verified and validated by me.       Deb Wu  02/14/20 2122       Tiago Ang MD  02/15/20 0021

## 2020-02-15 NOTE — PROGRESS NOTES
Discussed the case with Dr. Ang. He initially had called for potential admission to workup his NPH. On review of his records from [place] show that he has been seeing FERNANDO/Neurology since 2017 and is something [place], Jeronimo Dickey MD is aware of. Thus had stated that if the reason for admission was for the NPH he should follow up with his treating physicians (or can see another FERNANDO for 2nd opinion).     Review of chart looks like the patient was ambulating well and discussion with Dr. Ang and the patient is that he would like to be discharged with close outpatient follow up.

## 2020-02-28 ENCOUNTER — APPOINTMENT (OUTPATIENT)
Dept: CT IMAGING | Facility: HOSPITAL | Age: 80
End: 2020-02-28

## 2020-02-28 ENCOUNTER — HOSPITAL ENCOUNTER (EMERGENCY)
Facility: HOSPITAL | Age: 80
Discharge: HOME | End: 2020-02-28
Attending: EMERGENCY MEDICINE | Admitting: EMERGENCY MEDICINE

## 2020-02-28 VITALS
RESPIRATION RATE: 16 BRPM | OXYGEN SATURATION: 99 % | DIASTOLIC BLOOD PRESSURE: 63 MMHG | TEMPERATURE: 98.9 F | BODY MASS INDEX: 22.08 KG/M2 | HEART RATE: 78 BPM | HEIGHT: 62 IN | WEIGHT: 120 LBS | SYSTOLIC BLOOD PRESSURE: 143 MMHG

## 2020-02-28 DIAGNOSIS — S00.01XA ABRASION OF SCALP, INITIAL ENCOUNTER: ICD-10-CM
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
## 2020-02-28 DIAGNOSIS — S09.90XA CLOSED HEAD INJURY, INITIAL ENCOUNTER: ICD-10-CM

## 2020-02-28 PROCEDURE — 70450 CT HEAD/BRAIN W/O DYE: CPT

## 2020-02-28 PROCEDURE — 99282 EMERGENCY DEPT VISIT SF MDM: CPT

## 2020-02-28 PROCEDURE — 72125 CT NECK SPINE W/O DYE: CPT

## 2020-02-28 RX ORDER — GINSENG 100 MG
CAPSULE ORAL 2 TIMES DAILY
Qty: 14 G | Refills: 0 | Status: SHIPPED | OUTPATIENT
Start: 2020-02-28 | End: 2020-10-23

## 2020-03-23 DIAGNOSIS — E78.5 HYPERLIPIDEMIA, UNSPECIFIED HYPERLIPIDEMIA TYPE: ICD-10-CM

## 2020-03-23 DIAGNOSIS — E89.0 POSTOPERATIVE HYPOTHYROIDISM: ICD-10-CM

## 2020-03-23 DIAGNOSIS — E10.8 TYPE 1 DIABETES MELLITUS WITH COMPLICATIONS (HCC): Primary | ICD-10-CM

## 2020-03-23 DIAGNOSIS — I10 ESSENTIAL HYPERTENSION: ICD-10-CM

## 2020-04-01 ENCOUNTER — RESULTS ENCOUNTER (OUTPATIENT)
Dept: ENDOCRINOLOGY | Age: 80
End: 2020-04-01

## 2020-04-01 DIAGNOSIS — E78.5 HYPERLIPIDEMIA, UNSPECIFIED HYPERLIPIDEMIA TYPE: ICD-10-CM

## 2020-04-01 DIAGNOSIS — E10.8 TYPE 1 DIABETES MELLITUS WITH COMPLICATIONS (HCC): ICD-10-CM

## 2020-04-01 DIAGNOSIS — E89.0 POSTOPERATIVE HYPOTHYROIDISM: ICD-10-CM

## 2020-04-01 DIAGNOSIS — I10 ESSENTIAL HYPERTENSION: ICD-10-CM

## 2020-04-17 RX ORDER — PEN NEEDLE, DIABETIC 32GX 5/32"
NEEDLE, DISPOSABLE MISCELLANEOUS
Qty: 300 EACH | Refills: 0 | Status: SHIPPED | OUTPATIENT
Start: 2020-04-17 | End: 2020-07-02

## 2020-04-17 RX ORDER — INSULIN DEGLUDEC INJECTION 100 U/ML
INJECTION, SOLUTION SUBCUTANEOUS
Qty: 6 ML | Refills: 0 | Status: SHIPPED | OUTPATIENT
Start: 2020-04-17 | End: 2020-06-15

## 2020-04-30 RX ORDER — LEVOTHYROXINE SODIUM 88 UG/1
TABLET ORAL
Qty: 90 TABLET | Refills: 0 | Status: SHIPPED | OUTPATIENT
Start: 2020-04-30 | End: 2020-05-04

## 2020-05-04 RX ORDER — LEVOTHYROXINE SODIUM 88 UG/1
TABLET ORAL
Qty: 90 TABLET | Refills: 1 | Status: SHIPPED | OUTPATIENT
Start: 2020-05-04 | End: 2021-05-06

## 2020-06-15 RX ORDER — INSULIN DEGLUDEC INJECTION 100 U/ML
INJECTION, SOLUTION SUBCUTANEOUS
Qty: 8 ML | Refills: 5 | Status: SHIPPED | OUTPATIENT
Start: 2020-06-15 | End: 2020-11-02 | Stop reason: SDUPTHER

## 2020-06-19 LAB
ALBUMIN SERPL-MCNC: 3.8 G/DL (ref 3.5–5.2)
ALBUMIN/GLOB SERPL: 1.5 G/DL
ALP SERPL-CCNC: 273 U/L (ref 39–117)
ALT SERPL-CCNC: 12 U/L (ref 1–41)
AST SERPL-CCNC: <5 U/L (ref 1–40)
BILIRUB SERPL-MCNC: 0.7 MG/DL (ref 0.2–1.2)
BUN SERPL-MCNC: 22 MG/DL (ref 8–23)
BUN/CREAT SERPL: 17.6 (ref 7–25)
CALCIUM SERPL-MCNC: 9.6 MG/DL (ref 8.6–10.5)
CHLORIDE SERPL-SCNC: 102 MMOL/L (ref 98–107)
CHOLEST SERPL-MCNC: 149 MG/DL (ref 0–200)
CO2 SERPL-SCNC: 30.2 MMOL/L (ref 22–29)
CREAT SERPL-MCNC: 1.25 MG/DL (ref 0.76–1.27)
GLOBULIN SER CALC-MCNC: 2.5 GM/DL
GLUCOSE SERPL-MCNC: 200 MG/DL (ref 65–99)
HBA1C MFR BLD: 6.9 % (ref 4.8–5.6)
HDLC SERPL-MCNC: 61 MG/DL (ref 40–60)
INTERPRETATION: NORMAL
LDLC SERPL CALC-MCNC: 65 MG/DL (ref 0–100)
Lab: NORMAL
POTASSIUM SERPL-SCNC: 4.3 MMOL/L (ref 3.5–5.2)
PROT SERPL-MCNC: 6.3 G/DL (ref 6–8.5)
SODIUM SERPL-SCNC: 140 MMOL/L (ref 136–145)
T4 FREE SERPL-MCNC: 1.56 NG/DL (ref 0.93–1.7)
TRIGL SERPL-MCNC: 115 MG/DL (ref 0–150)
TSH SERPL DL<=0.005 MIU/L-ACNC: 0.32 UIU/ML (ref 0.27–4.2)
VLDLC SERPL CALC-MCNC: 23 MG/DL

## 2020-07-02 ENCOUNTER — OFFICE VISIT (OUTPATIENT)
Dept: ENDOCRINOLOGY | Age: 80
End: 2020-07-02

## 2020-07-02 VITALS
WEIGHT: 130.2 LBS | HEART RATE: 61 BPM | HEIGHT: 62 IN | OXYGEN SATURATION: 98 % | BODY MASS INDEX: 23.96 KG/M2 | DIASTOLIC BLOOD PRESSURE: 72 MMHG | SYSTOLIC BLOOD PRESSURE: 156 MMHG

## 2020-07-02 DIAGNOSIS — I10 ESSENTIAL HYPERTENSION: ICD-10-CM

## 2020-07-02 DIAGNOSIS — E10.43 TYPE 1 DIABETES MELLITUS WITH AUTONOMIC NEUROPATHY (HCC): Primary | ICD-10-CM

## 2020-07-02 DIAGNOSIS — E78.5 HYPERLIPIDEMIA, UNSPECIFIED HYPERLIPIDEMIA TYPE: ICD-10-CM

## 2020-07-02 DIAGNOSIS — K90.0 CELIAC DISEASE: ICD-10-CM

## 2020-07-02 DIAGNOSIS — E10.8 TYPE 1 DIABETES MELLITUS WITH COMPLICATIONS (HCC): ICD-10-CM

## 2020-07-02 DIAGNOSIS — I63.81 MULTIPLE LACUNAR INFARCTS (HCC): ICD-10-CM

## 2020-07-02 DIAGNOSIS — I25.10 CORONARY ARTERY DISEASE INVOLVING NATIVE CORONARY ARTERY OF NATIVE HEART WITHOUT ANGINA PECTORIS: ICD-10-CM

## 2020-07-02 DIAGNOSIS — E89.0 POSTOPERATIVE HYPOTHYROIDISM: ICD-10-CM

## 2020-07-02 PROCEDURE — 99214 OFFICE O/P EST MOD 30 MIN: CPT | Performed by: INTERNAL MEDICINE

## 2020-07-02 RX ORDER — DONEPEZIL HYDROCHLORIDE 10 MG/1
10 TABLET, FILM COATED ORAL
COMMUNITY
Start: 2020-04-14 | End: 2020-11-02 | Stop reason: SDUPTHER

## 2020-07-02 RX ORDER — BLOOD SUGAR DIAGNOSTIC
STRIP MISCELLANEOUS
Qty: 400 EACH | Refills: 1 | Status: SHIPPED | OUTPATIENT
Start: 2020-07-02 | End: 2020-07-07 | Stop reason: SDUPTHER

## 2020-07-03 LAB
ALBUMIN/CREAT UR: 299 MG/G CREAT (ref 0–29)
CREAT UR-MCNC: 93.5 MG/DL
MICROALBUMIN UR-MCNC: 279.3 UG/ML

## 2020-07-07 RX ORDER — BLOOD SUGAR DIAGNOSTIC
STRIP MISCELLANEOUS
Qty: 400 EACH | Refills: 1 | Status: SHIPPED | OUTPATIENT
Start: 2020-07-07 | End: 2022-04-15 | Stop reason: HOSPADM

## 2020-10-16 DIAGNOSIS — E78.5 HYPERLIPIDEMIA, UNSPECIFIED HYPERLIPIDEMIA TYPE: ICD-10-CM

## 2020-10-16 DIAGNOSIS — E10.43 TYPE 1 DIABETES MELLITUS WITH AUTONOMIC NEUROPATHY (HCC): Primary | ICD-10-CM

## 2020-10-16 DIAGNOSIS — R74.8 ELEVATED ALKALINE PHOSPHATASE LEVEL: ICD-10-CM

## 2020-10-16 DIAGNOSIS — I10 ESSENTIAL HYPERTENSION: ICD-10-CM

## 2020-10-16 DIAGNOSIS — E89.0 POSTOPERATIVE HYPOTHYROIDISM: ICD-10-CM

## 2020-10-16 DIAGNOSIS — I25.10 CORONARY ARTERY DISEASE INVOLVING NATIVE CORONARY ARTERY OF NATIVE HEART WITHOUT ANGINA PECTORIS: ICD-10-CM

## 2020-10-16 DIAGNOSIS — R68.89 ABNORMAL ENDOCRINE LABORATORY TEST FINDING: ICD-10-CM

## 2020-10-19 RX ORDER — CLOPIDOGREL BISULFATE 75 MG/1
TABLET ORAL
Qty: 90 TABLET | Refills: 0 | Status: SHIPPED | OUTPATIENT
Start: 2020-10-19 | End: 2021-01-21 | Stop reason: SDUPTHER

## 2020-10-19 RX ORDER — INSULIN ASPART 100 [IU]/ML
INJECTION, SOLUTION INTRAVENOUS; SUBCUTANEOUS
Qty: 22 ML | Refills: 2 | Status: SHIPPED | OUTPATIENT
Start: 2020-10-19 | End: 2021-04-07 | Stop reason: SDUPTHER

## 2020-10-19 RX ORDER — ISOSORBIDE MONONITRATE 30 MG/1
TABLET, EXTENDED RELEASE ORAL
Qty: 90 TABLET | Refills: 0 | Status: SHIPPED | OUTPATIENT
Start: 2020-10-19 | End: 2021-01-21 | Stop reason: SDUPTHER

## 2020-10-21 ENCOUNTER — RESULTS ENCOUNTER (OUTPATIENT)
Dept: ENDOCRINOLOGY | Age: 80
End: 2020-10-21

## 2020-10-21 DIAGNOSIS — E10.43 TYPE 1 DIABETES MELLITUS WITH AUTONOMIC NEUROPATHY (HCC): ICD-10-CM

## 2020-10-21 DIAGNOSIS — R74.8 ELEVATED ALKALINE PHOSPHATASE LEVEL: ICD-10-CM

## 2020-10-21 DIAGNOSIS — E89.0 POSTOPERATIVE HYPOTHYROIDISM: ICD-10-CM

## 2020-10-21 DIAGNOSIS — I25.10 CORONARY ARTERY DISEASE INVOLVING NATIVE CORONARY ARTERY OF NATIVE HEART WITHOUT ANGINA PECTORIS: ICD-10-CM

## 2020-10-21 DIAGNOSIS — E78.5 HYPERLIPIDEMIA, UNSPECIFIED HYPERLIPIDEMIA TYPE: ICD-10-CM

## 2020-10-21 DIAGNOSIS — I10 ESSENTIAL HYPERTENSION: ICD-10-CM

## 2020-10-23 RX ORDER — LOSARTAN POTASSIUM 50 MG/1
50 TABLET ORAL DAILY
COMMUNITY
Start: 2020-09-14

## 2020-10-24 LAB
ALBUMIN SERPL-MCNC: 3.9 G/DL (ref 3.5–5.2)
ALBUMIN/GLOB SERPL: 1.8 G/DL
ALP SERPL-CCNC: 284 U/L (ref 39–117)
ALT SERPL-CCNC: 15 U/L (ref 1–41)
AST SERPL-CCNC: 15 U/L (ref 1–40)
BILIRUB SERPL-MCNC: 0.7 MG/DL (ref 0–1.2)
BUN SERPL-MCNC: 18 MG/DL (ref 8–23)
BUN/CREAT SERPL: 16.4 (ref 7–25)
CALCIUM SERPL-MCNC: 9.3 MG/DL (ref 8.6–10.5)
CHLORIDE SERPL-SCNC: 105 MMOL/L (ref 98–107)
CHOLEST SERPL-MCNC: 146 MG/DL (ref 0–200)
CO2 SERPL-SCNC: 27.6 MMOL/L (ref 22–29)
CREAT SERPL-MCNC: 1.1 MG/DL (ref 0.76–1.27)
GLOBULIN SER CALC-MCNC: 2.2 GM/DL
GLUCOSE SERPL-MCNC: 120 MG/DL (ref 65–99)
HBA1C MFR BLD: 6.2 % (ref 4.8–5.6)
HDLC SERPL-MCNC: 61 MG/DL (ref 40–60)
INTERPRETATION: NORMAL
LDLC SERPL CALC-MCNC: 64 MG/DL (ref 0–100)
Lab: NORMAL
POTASSIUM SERPL-SCNC: 4.6 MMOL/L (ref 3.5–5.2)
PROT SERPL-MCNC: 6.1 G/DL (ref 6–8.5)
SODIUM SERPL-SCNC: 141 MMOL/L (ref 136–145)
T4 FREE SERPL-MCNC: 1.52 NG/DL (ref 0.93–1.7)
TRIGL SERPL-MCNC: 117 MG/DL (ref 0–150)
VLDLC SERPL CALC-MCNC: 21 MG/DL (ref 5–40)

## 2020-10-26 ENCOUNTER — HOSPITAL ENCOUNTER (OUTPATIENT)
Dept: CARDIOLOGY | Facility: HOSPITAL | Age: 80
Discharge: HOME | End: 2020-10-26
Admitting: INTERNAL MEDICINE

## 2020-10-26 ENCOUNTER — OFFICE VISIT (OUTPATIENT)
Dept: CARDIOLOGY | Facility: CLINIC | Age: 80
End: 2020-10-26

## 2020-10-26 VITALS
DIASTOLIC BLOOD PRESSURE: 80 MMHG | SYSTOLIC BLOOD PRESSURE: 159 MMHG | HEIGHT: 62 IN | BODY MASS INDEX: 23.78 KG/M2 | HEART RATE: 60 BPM

## 2020-10-26 VITALS
SYSTOLIC BLOOD PRESSURE: 151 MMHG | HEART RATE: 70 BPM | HEIGHT: 62 IN | DIASTOLIC BLOOD PRESSURE: 73 MMHG | WEIGHT: 130 LBS | BODY MASS INDEX: 23.92 KG/M2

## 2020-10-26 DIAGNOSIS — I10 ESSENTIAL HYPERTENSION: ICD-10-CM

## 2020-10-26 DIAGNOSIS — I25.10 CORONARY ARTERY DISEASE INVOLVING NATIVE CORONARY ARTERY OF NATIVE HEART WITHOUT ANGINA PECTORIS: Primary | ICD-10-CM

## 2020-10-26 DIAGNOSIS — E78.5 HYPERLIPIDEMIA, UNSPECIFIED HYPERLIPIDEMIA TYPE: ICD-10-CM

## 2020-10-26 DIAGNOSIS — I25.10 CORONARY ARTERY DISEASE INVOLVING NATIVE CORONARY ARTERY OF NATIVE HEART WITHOUT ANGINA PECTORIS: ICD-10-CM

## 2020-10-26 DIAGNOSIS — E78.49 OTHER HYPERLIPIDEMIA: ICD-10-CM

## 2020-10-26 LAB
Lab: NORMAL
Lab: NORMAL

## 2020-10-26 PROCEDURE — 99213 OFFICE O/P EST LOW 20 MIN: CPT | Performed by: INTERNAL MEDICINE

## 2020-10-26 PROCEDURE — 93306 TTE W/DOPPLER COMPLETE: CPT

## 2020-10-26 PROCEDURE — 93306 TTE W/DOPPLER COMPLETE: CPT | Performed by: INTERNAL MEDICINE

## 2020-10-26 PROCEDURE — 93000 ELECTROCARDIOGRAM COMPLETE: CPT | Performed by: INTERNAL MEDICINE

## 2020-10-27 LAB
ASCENDING AORTA: 2.6 CM
BH CV ECHO MEAS - ACS: 1.4 CM
BH CV ECHO MEAS - AO MAX PG (FULL): 10.6 MMHG
BH CV ECHO MEAS - AO MAX PG: 14 MMHG
BH CV ECHO MEAS - AO MEAN PG (FULL): 6 MMHG
BH CV ECHO MEAS - AO MEAN PG: 8 MMHG
BH CV ECHO MEAS - AO ROOT AREA (BSA CORRECTED): 1.9
BH CV ECHO MEAS - AO ROOT AREA: 7.4 CM^2
BH CV ECHO MEAS - AO ROOT DIAM: 3.1 CM
BH CV ECHO MEAS - AO V2 MAX: 187 CM/SEC
BH CV ECHO MEAS - AO V2 MEAN: 130 CM/SEC
BH CV ECHO MEAS - AO V2 VTI: 38.9 CM
BH CV ECHO MEAS - ASC AORTA: 2.6 CM
BH CV ECHO MEAS - AVA(I,A): 1.9 CM^2
BH CV ECHO MEAS - AVA(I,D): 1.9 CM^2
BH CV ECHO MEAS - AVA(V,A): 1.7 CM^2
BH CV ECHO MEAS - AVA(V,D): 1.7 CM^2
BH CV ECHO MEAS - BSA(HAYCOCK): 1.6 M^2
BH CV ECHO MEAS - BSA: 1.6 M^2
BH CV ECHO MEAS - BZI_BMI: 23.8 KILOGRAMS/M^2
BH CV ECHO MEAS - BZI_METRIC_HEIGHT: 157.5 CM
BH CV ECHO MEAS - BZI_METRIC_WEIGHT: 59 KG
BH CV ECHO MEAS - EDV(CUBED): 73.9 ML
BH CV ECHO MEAS - EDV(MOD-SP2): 69 ML
BH CV ECHO MEAS - EDV(MOD-SP4): 76 ML
BH CV ECHO MEAS - EDV(TEICH): 78.4 ML
BH CV ECHO MEAS - EF(CUBED): 75.3 %
BH CV ECHO MEAS - EF(MOD-BP): 57.9 %
BH CV ECHO MEAS - EF(MOD-SP2): 62.3 %
BH CV ECHO MEAS - EF(MOD-SP4): 60.5 %
BH CV ECHO MEAS - EF(TEICH): 67.6 %
BH CV ECHO MEAS - ESV(CUBED): 18.2 ML
BH CV ECHO MEAS - ESV(MOD-SP2): 26 ML
BH CV ECHO MEAS - ESV(MOD-SP4): 30 ML
BH CV ECHO MEAS - ESV(TEICH): 25.4 ML
BH CV ECHO MEAS - FS: 37.3 %
BH CV ECHO MEAS - IVS/LVPW: 0.91
BH CV ECHO MEAS - IVSD: 0.89 CM
BH CV ECHO MEAS - LA DIMENSION: 3.6 CM
BH CV ECHO MEAS - LA/AO: 1.2
BH CV ECHO MEAS - LAT PEAK E' VEL: 6.6 CM/SEC
BH CV ECHO MEAS - LV DIASTOLIC VOL/BSA (35-75): 47.7 ML/M^2
BH CV ECHO MEAS - LV MASS(C)D: 125.2 GRAMS
BH CV ECHO MEAS - LV MASS(C)DI: 78.7 GRAMS/M^2
BH CV ECHO MEAS - LV MAX PG: 3.4 MMHG
BH CV ECHO MEAS - LV MEAN PG: 2 MMHG
BH CV ECHO MEAS - LV SYSTOLIC VOL/BSA (12-30): 18.8 ML/M^2
BH CV ECHO MEAS - LV V1 MAX: 92.4 CM/SEC
BH CV ECHO MEAS - LV V1 MEAN: 67.9 CM/SEC
BH CV ECHO MEAS - LV V1 VTI: 20.8 CM
BH CV ECHO MEAS - LVIDD: 4.2 CM
BH CV ECHO MEAS - LVIDS: 2.6 CM
BH CV ECHO MEAS - LVLD AP2: 6.3 CM
BH CV ECHO MEAS - LVLD AP4: 6.4 CM
BH CV ECHO MEAS - LVLS AP2: 4.9 CM
BH CV ECHO MEAS - LVLS AP4: 5.8 CM
BH CV ECHO MEAS - LVOT AREA (M): 3.5 CM^2
BH CV ECHO MEAS - LVOT AREA: 3.5 CM^2
BH CV ECHO MEAS - LVOT DIAM: 2.1 CM
BH CV ECHO MEAS - LVPWD: 0.98 CM
BH CV ECHO MEAS - MED PEAK E' VEL: 8.2 CM/SEC
BH CV ECHO MEAS - MV A DUR: 0.15 SEC
BH CV ECHO MEAS - MV A MAX VEL: 122.7 CM/SEC
BH CV ECHO MEAS - MV DEC SLOPE: 386.3 CM/SEC^2
BH CV ECHO MEAS - MV DEC TIME: 0.19 SEC
BH CV ECHO MEAS - MV E MAX VEL: 93.6 CM/SEC
BH CV ECHO MEAS - MV E/A: 0.76
BH CV ECHO MEAS - MV MAX PG: 5.2 MMHG
BH CV ECHO MEAS - MV MEAN PG: 1.8 MMHG
BH CV ECHO MEAS - MV P1/2T MAX VEL: 99.9 CM/SEC
BH CV ECHO MEAS - MV P1/2T: 75.7 MSEC
BH CV ECHO MEAS - MV V2 MAX: 114.4 CM/SEC
BH CV ECHO MEAS - MV V2 MEAN: 59.3 CM/SEC
BH CV ECHO MEAS - MV V2 VTI: 31.5 CM
BH CV ECHO MEAS - MVA P1/2T LCG: 2.2 CM^2
BH CV ECHO MEAS - MVA(P1/2T): 2.9 CM^2
BH CV ECHO MEAS - MVA(VTI): 2.3 CM^2
BH CV ECHO MEAS - PA ACC TIME: 0.12 SEC
BH CV ECHO MEAS - PA MAX PG (FULL): 2.4 MMHG
BH CV ECHO MEAS - PA MAX PG: 3.3 MMHG
BH CV ECHO MEAS - PA PR(ACCEL): 25.5 MMHG
BH CV ECHO MEAS - PA V2 MAX: 90.9 CM/SEC
BH CV ECHO MEAS - PI END-D VEL: 82.8 CM/SEC
BH CV ECHO MEAS - PULM A REVS DUR: 0.15 SEC
BH CV ECHO MEAS - PULM A REVS VEL: 34.4 CM/SEC
BH CV ECHO MEAS - PULM DIAS VEL: 46.6 CM/SEC
BH CV ECHO MEAS - PULM S/D: 1.5
BH CV ECHO MEAS - PULM SYS VEL: 70.3 CM/SEC
BH CV ECHO MEAS - PVA(V,A): 1.7 CM^2
BH CV ECHO MEAS - PVA(V,D): 1.7 CM^2
BH CV ECHO MEAS - QP/QS: 0.53
BH CV ECHO MEAS - RV MAX PG: 0.87 MMHG
BH CV ECHO MEAS - RV MEAN PG: 0.5 MMHG
BH CV ECHO MEAS - RV V1 MAX: 46.6 CM/SEC
BH CV ECHO MEAS - RV V1 MEAN: 33.3 CM/SEC
BH CV ECHO MEAS - RV V1 VTI: 11.8 CM
BH CV ECHO MEAS - RVOT AREA: 3.2 CM^2
BH CV ECHO MEAS - RVOT DIAM: 2 CM
BH CV ECHO MEAS - SI(AO): 181.2 ML/M^2
BH CV ECHO MEAS - SI(CUBED): 34.9 ML/M^2
BH CV ECHO MEAS - SI(LVOT): 45.5 ML/M^2
BH CV ECHO MEAS - SI(MOD-SP2): 27 ML/M^2
BH CV ECHO MEAS - SI(MOD-SP4): 28.9 ML/M^2
BH CV ECHO MEAS - SI(TEICH): 33.3 ML/M^2
BH CV ECHO MEAS - SV(AO): 288.5 ML
BH CV ECHO MEAS - SV(CUBED): 55.6 ML
BH CV ECHO MEAS - SV(LVOT): 72.5 ML
BH CV ECHO MEAS - SV(MOD-SP2): 43 ML
BH CV ECHO MEAS - SV(MOD-SP4): 46 ML
BH CV ECHO MEAS - SV(RVOT): 38.1 ML
BH CV ECHO MEAS - SV(TEICH): 53 ML
BH CV ECHO MEAS - TAPSE (>1.6): 2 CM
BH CV ECHO MEASUREMENTS AVERAGE E/E' RATIO: 12.65
BH CV XLRA - RV BASE: 2.9 CM
BH CV XLRA - RV LENGTH: 5.2 CM
BH CV XLRA - RV MID: 2.9 CM
BH CV XLRA - TDI S': 12.5 CM/SEC
LEFT ATRIUM VOLUME INDEX: 18 ML/M2
MAXIMAL PREDICTED HEART RATE: 142 BPM
SINUS: 2.8 CM
STJ: 2.2 CM
STRESS TARGET HR: 121 BPM

## 2020-10-29 LAB
TSH SERPL DL<=0.005 MIU/L-ACNC: 2.55 UIU/ML (ref 0.27–4.2)
WRITTEN AUTHORIZATION: NORMAL

## 2020-11-02 ENCOUNTER — OFFICE VISIT (OUTPATIENT)
Dept: ENDOCRINOLOGY | Age: 80
End: 2020-11-02

## 2020-11-02 VITALS
SYSTOLIC BLOOD PRESSURE: 108 MMHG | HEART RATE: 59 BPM | WEIGHT: 136.8 LBS | BODY MASS INDEX: 25.17 KG/M2 | HEIGHT: 62 IN | OXYGEN SATURATION: 98 % | DIASTOLIC BLOOD PRESSURE: 60 MMHG

## 2020-11-02 DIAGNOSIS — E10.649 TYPE 1 DIABETES MELLITUS WITH HYPOGLYCEMIA UNAWARENESS (HCC): ICD-10-CM

## 2020-11-02 DIAGNOSIS — E78.5 HYPERLIPIDEMIA, UNSPECIFIED HYPERLIPIDEMIA TYPE: ICD-10-CM

## 2020-11-02 DIAGNOSIS — I25.10 CORONARY ARTERY DISEASE INVOLVING NATIVE CORONARY ARTERY OF NATIVE HEART WITHOUT ANGINA PECTORIS: ICD-10-CM

## 2020-11-02 DIAGNOSIS — I10 ESSENTIAL HYPERTENSION: ICD-10-CM

## 2020-11-02 DIAGNOSIS — R80.9 TYPE 1 DIABETES MELLITUS WITH PROTEINURIA (HCC): Primary | ICD-10-CM

## 2020-11-02 DIAGNOSIS — E10.29 TYPE 1 DIABETES MELLITUS WITH PROTEINURIA (HCC): Primary | ICD-10-CM

## 2020-11-02 DIAGNOSIS — E89.0 POSTOPERATIVE HYPOTHYROIDISM: ICD-10-CM

## 2020-11-02 PROCEDURE — 99214 OFFICE O/P EST MOD 30 MIN: CPT | Performed by: INTERNAL MEDICINE

## 2020-11-02 RX ORDER — INSULIN DEGLUDEC INJECTION 100 U/ML
INJECTION, SOLUTION SUBCUTANEOUS
Qty: 8 ML | Refills: 5
Start: 2020-11-02 | End: 2021-04-07 | Stop reason: SDUPTHER

## 2020-11-02 RX ORDER — DONEPEZIL HYDROCHLORIDE 10 MG/1
10 TABLET, FILM COATED ORAL NIGHTLY
Qty: 30 TABLET | Refills: 2 | Status: SHIPPED | OUTPATIENT
Start: 2020-11-02 | End: 2021-02-26

## 2020-11-02 RX ORDER — UBIDECARENONE 30 MG
1 CAPSULE ORAL
COMMUNITY
End: 2021-04-07

## 2020-11-16 ENCOUNTER — RESULTS ENCOUNTER (OUTPATIENT)
Dept: ENDOCRINOLOGY | Age: 80
End: 2020-11-16

## 2020-11-16 DIAGNOSIS — E89.0 POSTOPERATIVE HYPOTHYROIDISM: ICD-10-CM

## 2020-12-08 RX ORDER — PEN NEEDLE, DIABETIC 32GX 5/32"
NEEDLE, DISPOSABLE MISCELLANEOUS
Qty: 400 EACH | Refills: 2 | Status: SHIPPED | OUTPATIENT
Start: 2020-12-08 | End: 2021-04-07 | Stop reason: SDUPTHER

## 2021-01-21 RX ORDER — CLOPIDOGREL BISULFATE 75 MG/1
75 TABLET ORAL DAILY
Qty: 90 TABLET | Refills: 1 | Status: SHIPPED | OUTPATIENT
Start: 2021-01-21 | End: 2021-01-21 | Stop reason: SDUPTHER

## 2021-01-21 RX ORDER — ISOSORBIDE MONONITRATE 30 MG/1
30 TABLET, EXTENDED RELEASE ORAL DAILY
Qty: 90 TABLET | Refills: 1 | Status: SHIPPED | OUTPATIENT
Start: 2021-01-21 | End: 2021-06-01

## 2021-01-21 RX ORDER — ISOSORBIDE MONONITRATE 30 MG/1
30 TABLET, EXTENDED RELEASE ORAL DAILY
Qty: 90 TABLET | Refills: 1 | Status: SHIPPED | OUTPATIENT
Start: 2021-01-21 | End: 2021-01-21 | Stop reason: SDUPTHER

## 2021-01-21 RX ORDER — CLOPIDOGREL BISULFATE 75 MG/1
75 TABLET ORAL DAILY
Qty: 90 TABLET | Refills: 1 | Status: SHIPPED | OUTPATIENT
Start: 2021-01-21 | End: 2021-04-26

## 2021-01-21 NOTE — TELEPHONE ENCOUNTER
PATIENT NEEDS 90 DAY SUPPLIES SENT TO [facility]   clopidogrel (PLAVIX) 75 MG tablet  isosorbide mononitrate (IMDUR) 30 MG 24 hr tablet

## 2021-02-27 RX ORDER — DONEPEZIL HYDROCHLORIDE 10 MG/1
TABLET, FILM COATED ORAL
Qty: 90 TABLET | Refills: 1 | Status: SHIPPED | OUTPATIENT
Start: 2021-02-27

## 2021-03-18 DIAGNOSIS — I10 ESSENTIAL HYPERTENSION: ICD-10-CM

## 2021-03-18 DIAGNOSIS — E78.5 HYPERLIPIDEMIA, UNSPECIFIED HYPERLIPIDEMIA TYPE: ICD-10-CM

## 2021-03-18 DIAGNOSIS — R80.9 TYPE 1 DIABETES MELLITUS WITH PROTEINURIA (HCC): Primary | ICD-10-CM

## 2021-03-18 DIAGNOSIS — E10.29 TYPE 1 DIABETES MELLITUS WITH PROTEINURIA (HCC): Primary | ICD-10-CM

## 2021-03-18 DIAGNOSIS — E89.0 POSTOPERATIVE HYPOTHYROIDISM: ICD-10-CM

## 2021-03-24 LAB
ALBUMIN SERPL-MCNC: 4.1 G/DL (ref 3.5–5.2)
ALBUMIN/GLOB SERPL: 1.9 G/DL
ALP SERPL-CCNC: 324 U/L (ref 39–117)
ALT SERPL-CCNC: 11 U/L (ref 1–41)
AST SERPL-CCNC: 7 U/L (ref 1–40)
BILIRUB SERPL-MCNC: 0.7 MG/DL (ref 0–1.2)
BUN SERPL-MCNC: 17 MG/DL (ref 8–23)
BUN/CREAT SERPL: 15.7 (ref 7–25)
CALCIUM SERPL-MCNC: 9.3 MG/DL (ref 8.6–10.5)
CHLORIDE SERPL-SCNC: 101 MMOL/L (ref 98–107)
CHOLEST SERPL-MCNC: 151 MG/DL (ref 0–200)
CO2 SERPL-SCNC: 29.5 MMOL/L (ref 22–29)
CREAT SERPL-MCNC: 1.08 MG/DL (ref 0.76–1.27)
GLOBULIN SER CALC-MCNC: 2.2 GM/DL
GLUCOSE SERPL-MCNC: 160 MG/DL (ref 65–99)
HBA1C MFR BLD: 6.6 % (ref 4.8–5.6)
HDLC SERPL-MCNC: 63 MG/DL (ref 40–60)
IMP & REVIEW OF LAB RESULTS: NORMAL
LDLC SERPL CALC-MCNC: 66 MG/DL (ref 0–100)
Lab: NORMAL
POTASSIUM SERPL-SCNC: 4.7 MMOL/L (ref 3.5–5.2)
PROT SERPL-MCNC: 6.3 G/DL (ref 6–8.5)
SODIUM SERPL-SCNC: 139 MMOL/L (ref 136–145)
T4 FREE SERPL-MCNC: 1.47 NG/DL (ref 0.93–1.7)
TRIGL SERPL-MCNC: 128 MG/DL (ref 0–150)
TSH SERPL DL<=0.005 MIU/L-ACNC: 1.29 UIU/ML (ref 0.27–4.2)
VLDLC SERPL CALC-MCNC: 22 MG/DL (ref 5–40)

## 2021-04-07 ENCOUNTER — OFFICE VISIT (OUTPATIENT)
Dept: ENDOCRINOLOGY | Age: 81
End: 2021-04-07

## 2021-04-07 VITALS
HEIGHT: 62 IN | OXYGEN SATURATION: 98 % | BODY MASS INDEX: 24.99 KG/M2 | WEIGHT: 135.8 LBS | DIASTOLIC BLOOD PRESSURE: 60 MMHG | HEART RATE: 69 BPM | SYSTOLIC BLOOD PRESSURE: 120 MMHG

## 2021-04-07 DIAGNOSIS — R80.9 TYPE 1 DIABETES MELLITUS WITH PROTEINURIA (HCC): Primary | ICD-10-CM

## 2021-04-07 DIAGNOSIS — I63.81 MULTIPLE LACUNAR INFARCTS (HCC): ICD-10-CM

## 2021-04-07 DIAGNOSIS — E10.8 TYPE 1 DIABETES MELLITUS WITH COMPLICATIONS (HCC): ICD-10-CM

## 2021-04-07 DIAGNOSIS — E78.5 HYPERLIPIDEMIA, UNSPECIFIED HYPERLIPIDEMIA TYPE: ICD-10-CM

## 2021-04-07 DIAGNOSIS — E10.29 TYPE 1 DIABETES MELLITUS WITH PROTEINURIA (HCC): Primary | ICD-10-CM

## 2021-04-07 DIAGNOSIS — I25.10 CORONARY ARTERY DISEASE INVOLVING NATIVE CORONARY ARTERY OF NATIVE HEART WITHOUT ANGINA PECTORIS: ICD-10-CM

## 2021-04-07 DIAGNOSIS — I10 ESSENTIAL HYPERTENSION: ICD-10-CM

## 2021-04-07 DIAGNOSIS — E89.0 POSTOPERATIVE HYPOTHYROIDISM: ICD-10-CM

## 2021-04-07 PROBLEM — K90.0 CELIAC DISEASE: Status: RESOLVED | Noted: 2019-08-09 | Resolved: 2021-04-07

## 2021-04-07 PROCEDURE — 99214 OFFICE O/P EST MOD 30 MIN: CPT | Performed by: INTERNAL MEDICINE

## 2021-04-07 RX ORDER — PEN NEEDLE, DIABETIC 32GX 5/32"
NEEDLE, DISPOSABLE MISCELLANEOUS
Qty: 400 EACH | Refills: 2 | Status: SHIPPED | OUTPATIENT
Start: 2021-04-07 | End: 2022-01-20

## 2021-04-07 RX ORDER — INSULIN DEGLUDEC INJECTION 100 U/ML
INJECTION, SOLUTION SUBCUTANEOUS
Qty: 15 ML | Refills: 2
Start: 2021-04-07 | End: 2021-07-12

## 2021-04-07 RX ORDER — INSULIN ASPART 100 [IU]/ML
INJECTION, SOLUTION INTRAVENOUS; SUBCUTANEOUS
Qty: 30 ML | Refills: 2 | Status: SHIPPED | OUTPATIENT
Start: 2021-04-07 | End: 2021-11-29

## 2021-04-07 RX ORDER — ATORVASTATIN CALCIUM 20 MG/1
20 TABLET, FILM COATED ORAL DAILY
Qty: 90 TABLET | Refills: 2 | Status: SHIPPED | OUTPATIENT
Start: 2021-04-07 | End: 2021-10-04

## 2021-04-26 RX ORDER — CLOPIDOGREL BISULFATE 75 MG/1
TABLET ORAL
Qty: 90 TABLET | Refills: 1 | Status: SHIPPED | OUTPATIENT
Start: 2021-04-26 | End: 2021-10-08

## 2021-05-06 RX ORDER — LEVOTHYROXINE SODIUM 88 UG/1
TABLET ORAL
Qty: 90 TABLET | Refills: 1 | Status: SHIPPED | OUTPATIENT
Start: 2021-05-06 | End: 2021-08-05 | Stop reason: SDUPTHER

## 2021-06-01 RX ORDER — ISOSORBIDE MONONITRATE 30 MG/1
TABLET, EXTENDED RELEASE ORAL
Qty: 90 TABLET | Refills: 1 | Status: SHIPPED | OUTPATIENT
Start: 2021-06-01 | End: 2022-02-02

## 2021-07-12 RX ORDER — INSULIN DEGLUDEC INJECTION 100 U/ML
INJECTION, SOLUTION SUBCUTANEOUS
Qty: 6 ML | Refills: 6 | Status: SHIPPED | OUTPATIENT
Start: 2021-07-12 | End: 2021-08-05 | Stop reason: SDUPTHER

## 2021-07-21 DIAGNOSIS — R80.9 TYPE 1 DIABETES MELLITUS WITH PROTEINURIA (HCC): ICD-10-CM

## 2021-07-21 DIAGNOSIS — I10 ESSENTIAL HYPERTENSION: ICD-10-CM

## 2021-07-21 DIAGNOSIS — E10.29 TYPE 1 DIABETES MELLITUS WITH PROTEINURIA (HCC): ICD-10-CM

## 2021-07-21 DIAGNOSIS — E89.0 POSTOPERATIVE HYPOTHYROIDISM: Primary | ICD-10-CM

## 2021-07-21 DIAGNOSIS — E78.5 HYPERLIPIDEMIA, UNSPECIFIED HYPERLIPIDEMIA TYPE: ICD-10-CM

## 2021-07-29 ENCOUNTER — LAB (OUTPATIENT)
Dept: ENDOCRINOLOGY | Age: 81
End: 2021-07-29

## 2021-07-29 ENCOUNTER — TELEPHONE (OUTPATIENT)
Dept: ENDOCRINOLOGY | Age: 81
End: 2021-07-29

## 2021-07-29 DIAGNOSIS — R80.9 TYPE 1 DIABETES MELLITUS WITH PROTEINURIA (HCC): ICD-10-CM

## 2021-07-29 DIAGNOSIS — E89.0 POSTOPERATIVE HYPOTHYROIDISM: ICD-10-CM

## 2021-07-29 DIAGNOSIS — E10.29 TYPE 1 DIABETES MELLITUS WITH PROTEINURIA (HCC): ICD-10-CM

## 2021-07-29 NOTE — TELEPHONE ENCOUNTER
7/29 pt came in today for his labs / sw his wife she stated they are going to send the paperwork for his shoes     ----- Message from Griffin Angeles sent at 7/28/2021 12:45 PM EDT -----  Regarding: Non-Urgent Medical Question  Contact: [phone]  Could dr Austin send info to [phone] Marisol for Griffin to receiveDiabetic shoes. trell you so much . Your the greatest

## 2021-07-30 LAB
CREAT UR-MCNC: NORMAL MG/DL
MICROALBUMIN UR-MCNC: NORMAL
REQUEST PROBLEM: NORMAL
TSH SERPL DL<=0.005 MIU/L-ACNC: 0.48 UIU/ML (ref 0.45–4.5)

## 2021-08-02 LAB
ALBUMIN SERPL-MCNC: 3.8 G/DL (ref 3.7–4.7)
ALBUMIN/GLOB SERPL: 1.6 {RATIO} (ref 1.2–2.2)
ALP SERPL-CCNC: 377 IU/L (ref 48–121)
ALT SERPL-CCNC: 14 IU/L (ref 0–44)
AST SERPL-CCNC: 10 IU/L (ref 0–40)
BILIRUB SERPL-MCNC: 0.4 MG/DL (ref 0–1.2)
BUN SERPL-MCNC: 18 MG/DL (ref 8–27)
BUN/CREAT SERPL: 17 (ref 10–24)
CALCIUM SERPL-MCNC: 9.4 MG/DL (ref 8.6–10.2)
CHLORIDE SERPL-SCNC: 102 MMOL/L (ref 96–106)
CHOLEST SERPL-MCNC: 117 MG/DL (ref 100–199)
CO2 SERPL-SCNC: 21 MMOL/L (ref 20–29)
CREAT SERPL-MCNC: 1.08 MG/DL (ref 0.76–1.27)
GLOBULIN SER CALC-MCNC: 2.4 G/DL (ref 1.5–4.5)
GLUCOSE SERPL-MCNC: 191 MG/DL (ref 65–99)
HDLC SERPL-MCNC: 54 MG/DL
IMP & REVIEW OF LAB RESULTS: NORMAL
LDLC SERPL CALC-MCNC: 46 MG/DL (ref 0–99)
LDLC/HDLC SERPL: 0.9 RATIO (ref 0–3.6)
Lab: NORMAL
POTASSIUM SERPL-SCNC: 4.3 MMOL/L (ref 3.5–5.2)
PROT SERPL-MCNC: 6.2 G/DL (ref 6–8.5)
SODIUM SERPL-SCNC: 139 MMOL/L (ref 134–144)
SPECIMEN STATUS: NORMAL
T4 FREE SERPL-MCNC: 1.81 NG/DL (ref 0.82–1.77)
TRIGL SERPL-MCNC: 85 MG/DL (ref 0–149)
VLDLC SERPL CALC-MCNC: 17 MG/DL (ref 5–40)
WRITTEN AUTHORIZATION: NORMAL

## 2021-08-05 ENCOUNTER — OFFICE VISIT (OUTPATIENT)
Dept: ENDOCRINOLOGY | Age: 81
End: 2021-08-05

## 2021-08-05 ENCOUNTER — TELEPHONE (OUTPATIENT)
Dept: ENDOCRINOLOGY | Age: 81
End: 2021-08-05

## 2021-08-05 VITALS
SYSTOLIC BLOOD PRESSURE: 126 MMHG | HEART RATE: 73 BPM | DIASTOLIC BLOOD PRESSURE: 62 MMHG | BODY MASS INDEX: 24.18 KG/M2 | OXYGEN SATURATION: 98 % | HEIGHT: 62 IN | WEIGHT: 131.4 LBS

## 2021-08-05 DIAGNOSIS — I25.10 CORONARY ARTERY DISEASE INVOLVING NATIVE CORONARY ARTERY OF NATIVE HEART WITHOUT ANGINA PECTORIS: ICD-10-CM

## 2021-08-05 DIAGNOSIS — E89.0 POSTOPERATIVE HYPOTHYROIDISM: ICD-10-CM

## 2021-08-05 DIAGNOSIS — R80.9 TYPE 1 DIABETES MELLITUS WITH PROTEINURIA (HCC): Primary | ICD-10-CM

## 2021-08-05 DIAGNOSIS — E10.29 TYPE 1 DIABETES MELLITUS WITH PROTEINURIA (HCC): Primary | ICD-10-CM

## 2021-08-05 DIAGNOSIS — E78.5 HYPERLIPIDEMIA, UNSPECIFIED HYPERLIPIDEMIA TYPE: ICD-10-CM

## 2021-08-05 DIAGNOSIS — E10.649 TYPE 1 DIABETES MELLITUS WITH HYPOGLYCEMIA UNAWARENESS (HCC): ICD-10-CM

## 2021-08-05 DIAGNOSIS — E10.8 TYPE 1 DIABETES MELLITUS WITH COMPLICATIONS (HCC): ICD-10-CM

## 2021-08-05 PROCEDURE — 99214 OFFICE O/P EST MOD 30 MIN: CPT | Performed by: INTERNAL MEDICINE

## 2021-08-05 PROCEDURE — 95251 CONT GLUC MNTR ANALYSIS I&R: CPT | Performed by: INTERNAL MEDICINE

## 2021-08-05 RX ORDER — GUAIFENESIN 600 MG/1
600 TABLET, EXTENDED RELEASE ORAL
COMMUNITY
Start: 2021-07-21 | End: 2021-08-20

## 2021-08-05 RX ORDER — BROMPHENIRAMINE MALEATE, PSEUDOEPHEDRINE HYDROCHLORIDE, AND DEXTROMETHORPHAN HYDROBROMIDE 2; 30; 10 MG/5ML; MG/5ML; MG/5ML
5 SYRUP ORAL
COMMUNITY
Start: 2021-07-21 | End: 2022-04-15 | Stop reason: HOSPADM

## 2021-08-05 RX ORDER — INSULIN DEGLUDEC INJECTION 100 U/ML
INJECTION, SOLUTION SUBCUTANEOUS
Qty: 15 ML | Refills: 1 | Status: SHIPPED | OUTPATIENT
Start: 2021-08-05 | End: 2022-04-15 | Stop reason: HOSPADM

## 2021-08-05 RX ORDER — LEVOTHYROXINE SODIUM 88 UG/1
88 TABLET ORAL DAILY
Qty: 90 TABLET | Refills: 1 | Status: SHIPPED | OUTPATIENT
Start: 2021-08-05 | End: 2021-12-27

## 2021-08-05 RX ORDER — FLUTICASONE PROPIONATE 50 MCG
2 SPRAY, SUSPENSION (ML) NASAL DAILY PRN
COMMUNITY
Start: 2021-07-21

## 2021-08-06 LAB
ALBUMIN/CREAT UR: 662 MG/G CREAT (ref 0–29)
CREAT UR-MCNC: 64.5 MG/DL
HBA1C MFR BLD: 6.5 % (ref 4.8–5.6)
MICROALBUMIN UR-MCNC: 426.8 UG/ML

## 2021-08-08 NOTE — PROGRESS NOTES
LDL 46.  HDL 54.  Continue Lipitor 20 mg a day.Free T4 1.81.  TSH 0.478.  Continue levothyroxine 88 mcg/day.Alkaline phosphatase chronically elevated.  Follow-up with Dr. Fan Benoit.Send copy of labs to Dr. Jordan Benoit and Dr. Quevedo.

## 2021-08-09 NOTE — PROGRESS NOTES
Diabetes well controlled.Urine microalbumin elevated.  Copy of labs sent to patient through my chart.Send copy of labs to Dr. Quevedo

## 2021-10-04 RX ORDER — ATORVASTATIN CALCIUM 20 MG/1
TABLET, FILM COATED ORAL
Qty: 90 TABLET | Refills: 1 | Status: SHIPPED | OUTPATIENT
Start: 2021-10-04 | End: 2022-04-15 | Stop reason: HOSPADM

## 2021-10-08 RX ORDER — CLOPIDOGREL BISULFATE 75 MG/1
TABLET ORAL
Qty: 90 TABLET | Refills: 1 | Status: SHIPPED | OUTPATIENT
Start: 2021-10-08 | End: 2022-06-20

## 2021-11-04 ENCOUNTER — HOSPITAL ENCOUNTER (OUTPATIENT)
Dept: CARDIOLOGY | Facility: HOSPITAL | Age: 81
Discharge: HOME | End: 2021-11-04
Admitting: INTERNAL MEDICINE

## 2021-11-04 ENCOUNTER — OFFICE VISIT (OUTPATIENT)
Dept: CARDIOLOGY | Facility: CLINIC | Age: 81
End: 2021-11-04

## 2021-11-04 VITALS
DIASTOLIC BLOOD PRESSURE: 64 MMHG | BODY MASS INDEX: 21.05 KG/M2 | WEIGHT: 131 LBS | HEIGHT: 66 IN | HEART RATE: 64 BPM | SYSTOLIC BLOOD PRESSURE: 133 MMHG

## 2021-11-04 VITALS
HEIGHT: 66 IN | OXYGEN SATURATION: 98 % | HEART RATE: 67 BPM | SYSTOLIC BLOOD PRESSURE: 130 MMHG | WEIGHT: 139 LBS | DIASTOLIC BLOOD PRESSURE: 62 MMHG | BODY MASS INDEX: 22.34 KG/M2

## 2021-11-04 DIAGNOSIS — I10 ESSENTIAL HYPERTENSION: ICD-10-CM

## 2021-11-04 DIAGNOSIS — I25.10 CORONARY ARTERY DISEASE INVOLVING NATIVE CORONARY ARTERY OF NATIVE HEART WITHOUT ANGINA PECTORIS: Primary | ICD-10-CM

## 2021-11-04 DIAGNOSIS — E78.5 HYPERLIPIDEMIA, UNSPECIFIED HYPERLIPIDEMIA TYPE: ICD-10-CM

## 2021-11-04 DIAGNOSIS — I25.10 CORONARY ARTERY DISEASE INVOLVING NATIVE CORONARY ARTERY OF NATIVE HEART WITHOUT ANGINA PECTORIS: ICD-10-CM

## 2021-11-04 PROCEDURE — 93306 TTE W/DOPPLER COMPLETE: CPT | Performed by: INTERNAL MEDICINE

## 2021-11-04 PROCEDURE — 99213 OFFICE O/P EST LOW 20 MIN: CPT | Performed by: INTERNAL MEDICINE

## 2021-11-04 PROCEDURE — 93000 ELECTROCARDIOGRAM COMPLETE: CPT | Performed by: INTERNAL MEDICINE

## 2021-11-04 PROCEDURE — 93306 TTE W/DOPPLER COMPLETE: CPT

## 2021-11-04 RX ORDER — ISOSORBIDE DINITRATE 30 MG/1
TABLET ORAL
COMMUNITY
Start: 2021-08-17 | End: 2021-11-04 | Stop reason: SDUPTHER

## 2021-11-04 NOTE — PROGRESS NOTES
1 yr follow up    Subjective:        Griffin Angeles is a 80 y.o. male who here for follow up    CC  Follow-up coronary artery disease hyperlipidemia hypertension  HPI  80-year-old male with known history of coronary artery disease, benign essential arterial hypertension and hyperlipidemia here for the follow-up with no complaints of chest pains tightness heaviness or the pressure sensation     Problems Addressed this Visit        Cardiac and Vasculature    CAD (coronary artery disease) - Primary    Hyperlipidemia    Essential hypertension      Diagnoses       Codes Comments    Coronary artery disease involving native coronary artery of native heart without angina pectoris    -  Primary ICD-10-CM: I25.10  ICD-9-CM: 414.01     Essential hypertension     ICD-10-CM: I10  ICD-9-CM: 401.9     Hyperlipidemia, unspecified hyperlipidemia type     ICD-10-CM: E78.5  ICD-9-CM: 272.4         .    The following portions of the patient's history were reviewed and updated as appropriate: allergies, current medications, past family history, past medical history, past social history, past surgical history and problem list.    Past Medical History:   Diagnosis Date   • BPH (benign prostatic hypertrophy)    • CAD (coronary artery disease)    • Hyperlipidemia    • Hyperparathyroidism (HCC)    • Hypertension    • Hypothyroidism    • Lacunar infarction (HCC)     Bilateral basal ganglia   • Postherpetic neuralgia    • Shingles 06/2018   • Stroke (HCC)    • Type 2 diabetes mellitus (HCC)      reports that he quit smoking about 35 years ago. His smoking use included pipe. He has never used smokeless tobacco. He reports previous alcohol use. He reports that he does not use drugs.   Family History   Problem Relation Age of Onset   • Diabetes Mother    • Thyroid disease Mother    • Stroke Mother    • Diabetes Father    • Kidney failure Father    • Heart disease Sister        Review of Systems  Constitutional: No wt loss, fever,  "fatigue  Gastrointestinal: No nausea, abdominal pain  Behavioral/Psych: No insomnia or anxiety   Cardiovascular no chest pains or tightness in the chest  Objective:       Physical Exam  /62   Pulse 67   Ht 167.6 cm (66\")   Wt 63 kg (139 lb)   SpO2 98%   BMI 22.44 kg/m²   General appearance: No acute changes   Neck: Trachea midline; NECK, supple, no thyromegaly or lymphadenopathy   Lungs: Normal size and shape, normal breath sounds, equal distribution of air, no rales and rhonchi   CV: S1-S2 regular, no murmurs, no rub, no gallop   Abdomen: Soft, nontender; no masses , no abnormal abdominal sounds   Extremities: No deformity , normal color , no peripheral edema   Skin: Normal temperature, turgor and texture; no rash, ulcers            ECG 12 Lead    Date/Time: 11/4/2021 2:36 PM  Performed by: Moisés Kong MD  Authorized by: Moisés Kong MD   Comparison: compared with previous ECG   Similar to previous ECG  Rhythm: sinus rhythm    Clinical impression: non-specific ECG              Echocardiogram:        Current Outpatient Medications:   •  ACCU-CHEK SOFTCLIX LANCETS lancets, Dx code E10.8 testing bs 3 x day, Disp: 300 each, Rfl: 1  •  atorvastatin (LIPITOR) 20 MG tablet, TAKE 1 TABLET DAILY, Disp: 90 tablet, Rfl: 1  •  carbidopa-levodopa (SINEMET)  MG per tablet, 2 tablets in the morning 1 tablet at lunch and 1  Hs, Disp: , Rfl:   •  Cholecalciferol (VITAMIN D3) 1000 units capsule, Take 1 capsule by mouth Daily., Disp: , Rfl:   •  clopidogrel (PLAVIX) 75 MG tablet, TAKE 1 TABLET DAILY, Disp: 90 tablet, Rfl: 1  •  donepezil (ARICEPT) 10 MG tablet, TAKE 1 TABLET BY MOUTH ONCE DAILY AT NIGHT, Disp: 90 tablet, Rfl: 1  •  escitalopram (LEXAPRO) 5 MG tablet, Take 5 mg by mouth Daily., Disp: , Rfl:   •  fluticasone (FLONASE) 50 MCG/ACT nasal spray, 2 sprays into the nostril(s) as directed by provider Daily., Disp: , Rfl:   •  FREESTYLE LITE test strip, Dx code E10.8 testing bs 4 x day, " "Disp: 400 each, Rfl: 1  •  FREESTYLE PRECISION JEREMIAS TEST test strip, Dx code E10.43 Testing bs 4 x day, Disp: 400 each, Rfl: 1  •  glucagon (GLUCAGON EMERGENCY) 1 MG injection, Inject 1 mg under the skin into the appropriate area as directed 1 (One) Time As Needed for Low Blood Sugar., Disp: 1 kit, Rfl: 5  •  insulin degludec (Tresiba FlexTouch) 100 UNIT/ML solution pen-injector injection, INJECT 8 UNITS SUBCUTANEOUSLY IN THE MORNING AS DIRECTED INTO  APPROPRIATE  AREA, Disp: 15 mL, Rfl: 1  •  Insulin Pen Needle (BD Pen Needle Aydee U/F) 32G X 4 MM misc, Testing  bs 4 x day, Disp: 400 each, Rfl: 2  •  Insulin Syringe-Needle U-100 (RELION INSULIN SYRINGE) 31G X 15/64\" 0.5 ML misc, Using 3 syringes daily, Disp: 300 each, Rfl: 1  •  isosorbide mononitrate (IMDUR) 30 MG 24 hr tablet, TAKE 1 TABLET DAILY, Disp: 90 tablet, Rfl: 1  •  levothyroxine (Euthyrox) 88 MCG tablet, Take 1 tablet by mouth Daily., Disp: 90 tablet, Rfl: 1  •  losartan (COZAAR) 50 MG tablet, Take 50 mg by mouth Daily., Disp: , Rfl:   •  metoprolol succinate XL (TOPROL-XL) 25 MG 24 hr tablet, 25 mg Daily. 1/2 tablet daily, Disp: , Rfl: 3  •  niacin 500 MG tablet, Take 500 mg by mouth As Needed. In winter time, Disp: , Rfl:   •  NovoLOG FlexPen 100 UNIT/ML solution pen-injector sc pen, INJECT 5-6 UNITS SUBCUTANEOUSLY AT BREAKFAST 5-6 UNITS AT LUNCH AND 5-6 UNITS AT DINNER, Disp: 30 mL, Rfl: 2  •  tamsulosin (FLOMAX) 0.4 MG capsule 24 hr capsule, 1 capsule Daily. 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 Coronary artery disease involving native coronary artery of native heart without angina pectoris  I25.10 414.01   2. Essential hypertension  I10 401.9   3. Hyperlipidemia, unspecified hyperlipidemia type  E78.5 272.4     1. Coronary artery disease involving native coronary artery of native heart without angina pectoris  No angina pectoris    2. Essential hypertension  Blood pressure under control    3. Hyperlipidemia, unspecified hyperlipidemia type  Continue current treatment       1 YR-  COUNSELING:    Griffin Eisenberg was given to patient for the following topics: diagnostic results, risk factor reductions, impressions, risks and benefits of treatment options and importance of treatment compliance .       SMOKING COUNSELING:    [unfilled]    Dictated using Dragon dictation

## 2021-11-05 LAB
ASCENDING AORTA: 2.7 CM
BH CV ECHO MEAS - ACS: 1.5 CM
BH CV ECHO MEAS - AO MAX PG (FULL): 8.7 MMHG
BH CV ECHO MEAS - AO MAX PG: 11.8 MMHG
BH CV ECHO MEAS - AO MEAN PG (FULL): 5.7 MMHG
BH CV ECHO MEAS - AO MEAN PG: 7.1 MMHG
BH CV ECHO MEAS - AO ROOT AREA (BSA CORRECTED): 1.7
BH CV ECHO MEAS - AO ROOT AREA: 6.1 CM^2
BH CV ECHO MEAS - AO ROOT DIAM: 2.8 CM
BH CV ECHO MEAS - AO V2 MAX: 171.6 CM/SEC
BH CV ECHO MEAS - AO V2 MEAN: 125.9 CM/SEC
BH CV ECHO MEAS - AO V2 VTI: 41.2 CM
BH CV ECHO MEAS - ASC AORTA: 2.5 CM
BH CV ECHO MEAS - AVA(I,A): 1.5 CM^2
BH CV ECHO MEAS - AVA(I,D): 1.5 CM^2
BH CV ECHO MEAS - AVA(V,A): 1.4 CM^2
BH CV ECHO MEAS - AVA(V,D): 1.4 CM^2
BH CV ECHO MEAS - BSA(HAYCOCK): 1.7 M^2
BH CV ECHO MEAS - BSA: 1.7 M^2
BH CV ECHO MEAS - BZI_BMI: 21.1 KILOGRAMS/M^2
BH CV ECHO MEAS - BZI_METRIC_HEIGHT: 167.6 CM
BH CV ECHO MEAS - BZI_METRIC_WEIGHT: 59.4 KG
BH CV ECHO MEAS - EDV(CUBED): 53.1 ML
BH CV ECHO MEAS - EDV(MOD-SP2): 27 ML
BH CV ECHO MEAS - EDV(MOD-SP4): 52 ML
BH CV ECHO MEAS - EDV(TEICH): 60.4 ML
BH CV ECHO MEAS - EF(CUBED): 61.8 %
BH CV ECHO MEAS - EF(MOD-BP): 56.3 %
BH CV ECHO MEAS - EF(MOD-SP2): 40.7 %
BH CV ECHO MEAS - EF(MOD-SP4): 63.5 %
BH CV ECHO MEAS - EF(TEICH): 54.1 %
BH CV ECHO MEAS - ESV(CUBED): 20.3 ML
BH CV ECHO MEAS - ESV(MOD-SP2): 16 ML
BH CV ECHO MEAS - ESV(MOD-SP4): 19 ML
BH CV ECHO MEAS - ESV(TEICH): 27.7 ML
BH CV ECHO MEAS - FS: 27.4 %
BH CV ECHO MEAS - IVS/LVPW: 0.96
BH CV ECHO MEAS - IVSD: 1 CM
BH CV ECHO MEAS - LA DIMENSION: 2.2 CM
BH CV ECHO MEAS - LA/AO: 0.78
BH CV ECHO MEAS - LAT PEAK E' VEL: 7.9 CM/SEC
BH CV ECHO MEAS - LV DIASTOLIC VOL/BSA (35-75): 31.1 ML/M^2
BH CV ECHO MEAS - LV MASS(C)D: 127.2 GRAMS
BH CV ECHO MEAS - LV MASS(C)DI: 76.1 GRAMS/M^2
BH CV ECHO MEAS - LV MAX PG: 3 MMHG
BH CV ECHO MEAS - LV MEAN PG: 1.5 MMHG
BH CV ECHO MEAS - LV SYSTOLIC VOL/BSA (12-30): 11.4 ML/M^2
BH CV ECHO MEAS - LV V1 MAX: 87.3 CM/SEC
BH CV ECHO MEAS - LV V1 MEAN: 55.3 CM/SEC
BH CV ECHO MEAS - LV V1 VTI: 22.6 CM
BH CV ECHO MEAS - LVIDD: 3.8 CM
BH CV ECHO MEAS - LVIDS: 2.7 CM
BH CV ECHO MEAS - LVLD AP2: 5.9 CM
BH CV ECHO MEAS - LVLD AP4: 6.5 CM
BH CV ECHO MEAS - LVLS AP2: 5.9 CM
BH CV ECHO MEAS - LVLS AP4: 5.8 CM
BH CV ECHO MEAS - LVOT AREA (M): 2.8 CM^2
BH CV ECHO MEAS - LVOT AREA: 2.8 CM^2
BH CV ECHO MEAS - LVOT DIAM: 1.9 CM
BH CV ECHO MEAS - LVPWD: 1.1 CM
BH CV ECHO MEAS - MED PEAK E' VEL: 6.3 CM/SEC
BH CV ECHO MEAS - MR MAX PG: 63.7 MMHG
BH CV ECHO MEAS - MR MAX VEL: 399.1 CM/SEC
BH CV ECHO MEAS - MV A MAX VEL: 98 CM/SEC
BH CV ECHO MEAS - MV DEC SLOPE: 371.9 CM/SEC^2
BH CV ECHO MEAS - MV DEC TIME: 0.24 SEC
BH CV ECHO MEAS - MV E MAX VEL: 82 CM/SEC
BH CV ECHO MEAS - MV E/A: 0.84
BH CV ECHO MEAS - MV MAX PG: 4.8 MMHG
BH CV ECHO MEAS - MV MEAN PG: 1.8 MMHG
BH CV ECHO MEAS - MV P1/2T MAX VEL: 116.1 CM/SEC
BH CV ECHO MEAS - MV P1/2T: 91.5 MSEC
BH CV ECHO MEAS - MV V2 MAX: 110.1 CM/SEC
BH CV ECHO MEAS - MV V2 MEAN: 61 CM/SEC
BH CV ECHO MEAS - MV V2 VTI: 37.6 CM
BH CV ECHO MEAS - MVA P1/2T LCG: 1.9 CM^2
BH CV ECHO MEAS - MVA(P1/2T): 2.4 CM^2
BH CV ECHO MEAS - MVA(VTI): 1.7 CM^2
BH CV ECHO MEAS - PA ACC TIME: 0.14 SEC
BH CV ECHO MEAS - PA MAX PG (FULL): 1.8 MMHG
BH CV ECHO MEAS - PA MAX PG: 2.6 MMHG
BH CV ECHO MEAS - PA PR(ACCEL): 14 MMHG
BH CV ECHO MEAS - PA V2 MAX: 80.1 CM/SEC
BH CV ECHO MEAS - PI END-D VEL: 99.1 CM/SEC
BH CV ECHO MEAS - PULM A REVS DUR: 0.21 SEC
BH CV ECHO MEAS - PULM A REVS VEL: 33 CM/SEC
BH CV ECHO MEAS - PULM DIAS VEL: 44.1 CM/SEC
BH CV ECHO MEAS - PULM S/D: 1.6
BH CV ECHO MEAS - PULM SYS VEL: 72.3 CM/SEC
BH CV ECHO MEAS - PVA(V,A): 1.2 CM^2
BH CV ECHO MEAS - PVA(V,D): 1.2 CM^2
BH CV ECHO MEAS - QP/QS: 0.29
BH CV ECHO MEAS - RAP SYSTOLE: 3 MMHG
BH CV ECHO MEAS - RV MAX PG: 0.75 MMHG
BH CV ECHO MEAS - RV MEAN PG: 0.4 MMHG
BH CV ECHO MEAS - RV V1 MAX: 43.2 CM/SEC
BH CV ECHO MEAS - RV V1 MEAN: 29.1 CM/SEC
BH CV ECHO MEAS - RV V1 VTI: 8.5 CM
BH CV ECHO MEAS - RVDD: 3.2 CM
BH CV ECHO MEAS - RVOT AREA: 2.2 CM^2
BH CV ECHO MEAS - RVOT DIAM: 1.7 CM
BH CV ECHO MEAS - RVSP: 19.5 MMHG
BH CV ECHO MEAS - SI(AO): 149.7 ML/M^2
BH CV ECHO MEAS - SI(CUBED): 19.6 ML/M^2
BH CV ECHO MEAS - SI(LVOT): 37.5 ML/M^2
BH CV ECHO MEAS - SI(MOD-SP2): 6.6 ML/M^2
BH CV ECHO MEAS - SI(MOD-SP4): 19.7 ML/M^2
BH CV ECHO MEAS - SI(TEICH): 19.5 ML/M^2
BH CV ECHO MEAS - SV(AO): 250.2 ML
BH CV ECHO MEAS - SV(CUBED): 32.8 ML
BH CV ECHO MEAS - SV(LVOT): 62.6 ML
BH CV ECHO MEAS - SV(MOD-SP2): 11 ML
BH CV ECHO MEAS - SV(MOD-SP4): 33 ML
BH CV ECHO MEAS - SV(RVOT): 18.4 ML
BH CV ECHO MEAS - SV(TEICH): 32.7 ML
BH CV ECHO MEAS - TAPSE (>1.6): 2.1 CM
BH CV ECHO MEAS - TR MAX VEL: 203.1 CM/SEC
BH CV ECHO MEASUREMENTS AVERAGE E/E' RATIO: 11.55
BH CV XLRA - RV BASE: 2.8 CM
BH CV XLRA - RV LENGTH: 7.2 CM
BH CV XLRA - RV MID: 2.5 CM
SINUS: 2.5 CM
STJ: 2.5 CM

## 2021-11-29 RX ORDER — INSULIN ASPART 100 [IU]/ML
INJECTION, SOLUTION INTRAVENOUS; SUBCUTANEOUS
Qty: 30 ML | Refills: 1 | Status: SHIPPED | OUTPATIENT
Start: 2021-11-29 | End: 2023-02-09 | Stop reason: SDUPTHER

## 2021-12-27 RX ORDER — LEVOTHYROXINE SODIUM 88 UG/1
TABLET ORAL
Qty: 90 TABLET | Refills: 1 | Status: SHIPPED | OUTPATIENT
Start: 2021-12-27 | End: 2022-07-07

## 2022-01-20 RX ORDER — PEN NEEDLE, DIABETIC 32GX 5/32"
NEEDLE, DISPOSABLE MISCELLANEOUS
Qty: 400 EACH | Refills: 2 | Status: SHIPPED | OUTPATIENT
Start: 2022-01-20 | End: 2022-11-28

## 2022-02-02 RX ORDER — ISOSORBIDE MONONITRATE 30 MG/1
TABLET, EXTENDED RELEASE ORAL
Qty: 90 TABLET | Refills: 1 | Status: SHIPPED | OUTPATIENT
Start: 2022-02-02 | End: 2022-11-10 | Stop reason: SDUPTHER

## 2022-02-17 DIAGNOSIS — E78.5 HYPERLIPIDEMIA, UNSPECIFIED HYPERLIPIDEMIA TYPE: Primary | ICD-10-CM

## 2022-02-17 DIAGNOSIS — E10.649 TYPE 1 DIABETES MELLITUS WITH HYPOGLYCEMIA UNAWARENESS: ICD-10-CM

## 2022-02-17 DIAGNOSIS — I10 ESSENTIAL HYPERTENSION: ICD-10-CM

## 2022-02-17 DIAGNOSIS — E89.0 POSTOPERATIVE HYPOTHYROIDISM: ICD-10-CM

## 2022-03-18 NOTE — PROGRESS NOTES
Subjective   Griffin Angeles is a 81 y.o. male.      F/u for dm 1, hyperlipidemia, ED/ testing bs 4 x day using  the freestyle nitza / last dm eye exam 12/16/21 with dr Acevedo / last dm foot exam today  with dr Silver        Patient is a 81-year-old male who came in for diabetes control. He has known diabetes mellitus for 30 years. He has been on Tresiba 8 q AM and Novolog 6-7 units with each meal.  He has freestyle nitza sensor 14.  He has no significant weight change since August 2021.  Hemoglobin A1c done in March 2022 is 6.8%.     Sensor data reviewed.    Average glucose 162 mg per DL.  Time in target 67%.  Time above target 33%.    Time below target 0%.  He has few hypoglycemia after meals.      His last eye examination was in 12/21. He has nonproliferative diabetic retinopathy and had an embolic plaque on left retinal artery.  He denies numbness, tingling or burning on his hands or feet. He has albuminuria on urine sample taken in 8/21. He is on losartan.       He had previous thyroidectomy for unknown thyroid disease. He has been on levothyroxine 88 mcg per day. TSH done in March 2022 is normal at 1.05.     He has known coronary disease and had previous angioplasty with stent and history of hypertension. He had a normal stress test in done in 9/18 by Dr. Kong. He is on Plavix, Toprol, losartan, and Imdur.  He denies chest pain or SOB.      He has hyperlipidemia and has been on Lipitor 20 mg once a day. He denies any myalgia.  Lipid panel done in March 2022 are as follows: Cholesterol 141.  HDL 66.  LDL 50.  Triglycerides 148.     He was seen for gait abnormality last September 2017 by Dr. Mcdonald.  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  He is seeing neurologist at the [facility] and was told to have parkinsonism.  Namenda made him too sleepy and it was discontinued.  He is on donezepil 5 mg daily and Sinemet.  He has  "difficulty with short-term memory.  Carotid ultrasound done in April 2019 showed bilateral atherosclerotic plaque but no significant stenosis.      He has erectile dysfunction and urinary incontinence and has been seeing Dr. Love. He had penile implant in 8/16.  He has urinary incontinence and is using diapers.  He has nocturia 2-3 times a night.  He is on Flomax prn for urinary retention.      He was seen by Dr. Fan Benoit in April 2019 for diarrhea.  He had an EGD which showed preserved villous structure and Brunner's gland hyperplasia.  Diarrhea did not improve on gluten-free diet.  He had a colonoscopy in May 2019 and had transverse colon polyp with focal adenomatous change without high-grade dysplasia was removed. He denies melena or hematochezia.     He had Moderna vaccine without major side effects.    The following portions of the patient's history were reviewed and updated as appropriate: allergies, current medications, past family history, past medical history, past social history, past surgical history and problem list.    Review of Systems   Eyes: Negative for visual disturbance.   Respiratory: Negative for shortness of breath.    Cardiovascular: Negative for chest pain and palpitations.   Gastrointestinal: Positive for diarrhea. Negative for anal bleeding and blood in stool.   Endocrine: Negative for cold intolerance and heat intolerance.   Genitourinary:        Nocturia and urinary incontinence   Musculoskeletal: Negative for myalgias.   Neurological: Negative for numbness.     Objective      Vitals:    03/21/22 1134   BP: 133/59   Pulse: 65   SpO2: 98%   Weight: 59 kg (130 lb)   Height: 167.6 cm (65.98\")     Physical Exam  Constitutional:       General: He is not in acute distress.     Appearance: Normal appearance. He is not ill-appearing or toxic-appearing.   Eyes:      General: No scleral icterus.        Right eye: No discharge.         Left eye: No discharge.      Extraocular Movements: " Extraocular movements intact.      Conjunctiva/sclera: Conjunctivae normal.   Cardiovascular:      Rate and Rhythm: Normal rate and regular rhythm.      Pulses: Normal pulses.      Heart sounds: Normal heart sounds. No murmur heard.    No friction rub.   Pulmonary:      Effort: Pulmonary effort is normal. No respiratory distress.      Breath sounds: Normal breath sounds. No stridor.   Abdominal:      General: Bowel sounds are normal.      Palpations: Abdomen is soft.      Tenderness: There is no right CVA tenderness or left CVA tenderness.   Musculoskeletal:         General: Normal range of motion.      Comments: No plantar ulcer   Neurological:      General: No focal deficit present.      Mental Status: He is alert and oriented to person, place, and time.      Comments: Intact light touch on lower ext       Results Encounter on 03/14/2022   Component Date Value Ref Range Status   • Glucose 03/11/2022 189 (A) 65 - 99 mg/dL Final   • BUN 03/11/2022 22  8 - 27 mg/dL Final   • Creatinine 03/11/2022 1.08  0.76 - 1.27 mg/dL Final   • EGFR Result 03/11/2022 69  >59 mL/min/1.73 Final   • BUN/Creatinine Ratio 03/11/2022 20  10 - 24 Final   • Sodium 03/11/2022 141  134 - 144 mmol/L Final   • Potassium 03/11/2022 4.6  3.5 - 5.2 mmol/L Final   • Chloride 03/11/2022 101  96 - 106 mmol/L Final   • Total CO2 03/11/2022 26  20 - 29 mmol/L Final   • Calcium 03/11/2022 9.7  8.6 - 10.2 mg/dL Final   • Total Protein 03/11/2022 6.5  6.0 - 8.5 g/dL Final   • Albumin 03/11/2022 4.1  3.7 - 4.7 g/dL Final   • Globulin 03/11/2022 2.4  1.5 - 4.5 g/dL Final   • A/G Ratio 03/11/2022 1.7  1.2 - 2.2 Final   • Total Bilirubin 03/11/2022 0.5  0.0 - 1.2 mg/dL Final   • Alkaline Phosphatase 03/11/2022 323 (A) 44 - 121 IU/L Final   • AST (SGOT) 03/11/2022 11  0 - 40 IU/L Final   • ALT (SGPT) 03/11/2022 5  0 - 44 IU/L Final   • Total Cholesterol 03/11/2022 141  100 - 199 mg/dL Final   • Triglycerides 03/11/2022 148  0 - 149 mg/dL Final   • HDL  Cholesterol 03/11/2022 66  >39 mg/dL Final   • VLDL Cholesterol Woody 03/11/2022 25  5 - 40 mg/dL Final   • LDL Chol Calc ([facility]) 03/11/2022 50  0 - 99 mg/dL Final   • TSH 03/11/2022 1.050  0.450 - 4.500 uIU/mL Final   • Free T4 03/11/2022 1.67  0.82 - 1.77 ng/dL Final   • Hemoglobin A1C 03/11/2022 6.8 (A) 4.8 - 5.6 % Final    Comment:          Prediabetes: 5.7 - 6.4           Diabetes: >6.4           Glycemic control for adults with diabetes: <7.0     • Interpretation 03/11/2022 Note   Final    Supplemental report is available.   • Interpretation 03/11/2022 Note   Final    Comment: Medical Director's Note: Effective 2/28/2022, [facility] uses  the 2021 CKD-EPI creatinine equation without a race factor  to calculate and report eGFR results. eGFR results reported  prior to this date using the 2009 CKD-EPI equation are no  longer included in this report interpretation.  -------------------------------  CHRONIC KIDNEY DISEASE:  EGFR, BLOOD PRESSURE, AND PROTEINURIA ASSESSMENT  Estimated GFR is 60 mL/min/1.73mE2 or above; this patient is  not presently in CKD stage 3 or higher, therefore we are  unable to provide suggestions for treatment or follow-up.  EGFR, BLOOD PRESSURE, AND PROTEINURIA TREATMENT SUGGESTIONS  -  Assessment of albuminuria (urine albumin:creatinine ratio or  urine protein:creatinine ratio preferred) is recommended at  least annually in CKD patients for staging and disease  prognosis.  -------------------------------  DISCLAIMER  These assessments and treatment suggestions are provided as  a convenience in support of the physician-patient  relationship                            and are not intended to replace the physician's  clinical judgment. They are derived from national guidelines  in addition to other evidence and expert opinion. The  clinician should consider this information within the  context of clinical opinion and the individual patient.  SEE GUIDANCE FOR CHRONIC KIDNEY DISEASE PROGRAM:  Kidney  Disease Improving Global Outcomes (KDIGO) clinical practice  guidelines are at http://kdigo.org/home/guidelines/.  [employer] Kid[employer] Kidney Disease Outcomes Quality  Initiative (KDOQI (TM)), with its limitations and  disclaimers, are at www.kidney.org/professionals/KDOQI. This  program is intended for patients who have been diagnosed  with stages 3, 4, or pre-dialysis 5 CKD. It is not intended  for children, pregnant patients, or transplant patients.       Assessment/Plan   Diagnoses and all orders for this visit:    1. 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 Coronary artery disease involving native coronary artery of native heart without angina pectoris    3. Hyperlipidemia, unspecified hyperlipidemia type    4. Postoperative hypothyroidism    5. Essential hypertension    6. 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 Multiple lacunar infarcts (HCC)      Continue Tresiba and NovoLog.  May switch to freestyle nitza 2 sensor.  Continue losartan, Imdur, Toprol, and Toprol-XL per cardiologist.  Continue levothyroxine 88 mcg/day.  Continue Lipitor 20 mg/day.  Follow-up with neurologist.    Copy my note sent to Dr. Quevedo and Dr. Benoit.    RTC 4 mos.

## 2022-03-21 ENCOUNTER — OFFICE VISIT (OUTPATIENT)
Dept: ENDOCRINOLOGY | Age: 82
End: 2022-03-21

## 2022-03-21 VITALS
WEIGHT: 130 LBS | DIASTOLIC BLOOD PRESSURE: 59 MMHG | SYSTOLIC BLOOD PRESSURE: 133 MMHG | OXYGEN SATURATION: 98 % | HEIGHT: 66 IN | BODY MASS INDEX: 20.89 KG/M2 | HEART RATE: 65 BPM

## 2022-03-21 DIAGNOSIS — I10 ESSENTIAL HYPERTENSION: ICD-10-CM

## 2022-03-21 DIAGNOSIS — I25.10 CORONARY ARTERY DISEASE INVOLVING NATIVE CORONARY ARTERY OF NATIVE HEART WITHOUT ANGINA PECTORIS: ICD-10-CM

## 2022-03-21 DIAGNOSIS — E10.8 TYPE 1 DIABETES MELLITUS WITH COMPLICATIONS: Primary | ICD-10-CM

## 2022-03-21 DIAGNOSIS — E10.29 TYPE 1 DIABETES MELLITUS WITH PROTEINURIA: ICD-10-CM

## 2022-03-21 DIAGNOSIS — E89.0 POSTOPERATIVE HYPOTHYROIDISM: ICD-10-CM

## 2022-03-21 DIAGNOSIS — E78.5 HYPERLIPIDEMIA, UNSPECIFIED HYPERLIPIDEMIA TYPE: ICD-10-CM

## 2022-03-21 DIAGNOSIS — I63.81 MULTIPLE LACUNAR INFARCTS: ICD-10-CM

## 2022-03-21 DIAGNOSIS — R80.9 TYPE 1 DIABETES MELLITUS WITH PROTEINURIA: ICD-10-CM

## 2022-03-21 DIAGNOSIS — E10.649 TYPE 1 DIABETES MELLITUS WITH HYPOGLYCEMIA UNAWARENESS: ICD-10-CM

## 2022-03-21 PROCEDURE — 95251 CONT GLUC MNTR ANALYSIS I&R: CPT | Performed by: INTERNAL MEDICINE

## 2022-03-21 PROCEDURE — 99214 OFFICE O/P EST MOD 30 MIN: CPT | Performed by: INTERNAL MEDICINE

## 2022-03-22 ENCOUNTER — TELEPHONE (OUTPATIENT)
Dept: ENDOCRINOLOGY | Age: 82
End: 2022-03-22

## 2022-03-22 ENCOUNTER — PRIOR AUTHORIZATION (OUTPATIENT)
Dept: ENDOCRINOLOGY | Age: 82
End: 2022-03-22

## 2022-04-11 ENCOUNTER — HOSPITAL ENCOUNTER (INPATIENT)
Facility: HOSPITAL | Age: 82
LOS: 2 days | Discharge: NURSING, REHAB OR LONG-TERM CARE | End: 2022-04-15
Attending: EMERGENCY MEDICINE | Admitting: INTERNAL MEDICINE

## 2022-04-11 ENCOUNTER — APPOINTMENT (OUTPATIENT)
Dept: GENERAL RADIOLOGY | Facility: HOSPITAL | Age: 82
End: 2022-04-11

## 2022-04-11 ENCOUNTER — APPOINTMENT (OUTPATIENT)
Dept: CT IMAGING | Facility: HOSPITAL | Age: 82
End: 2022-04-11

## 2022-04-11 DIAGNOSIS — S00.31XA ABRASION OF NOSE, INITIAL ENCOUNTER: ICD-10-CM

## 2022-04-11 DIAGNOSIS — G20 PARKINSON'S DISEASE: Primary | ICD-10-CM

## 2022-04-11 DIAGNOSIS — S09.90XA MINOR HEAD INJURY, INITIAL ENCOUNTER: ICD-10-CM

## 2022-04-11 DIAGNOSIS — R29.6 FREQUENT FALLS: ICD-10-CM

## 2022-04-11 PROBLEM — G20.A1 PARKINSON'S DISEASE: Status: ACTIVE | Noted: 2022-04-11

## 2022-04-11 LAB
ALBUMIN SERPL-MCNC: 3.7 G/DL (ref 3.5–5.2)
ALBUMIN/GLOB SERPL: 1.4 G/DL
ALP SERPL-CCNC: 279 U/L (ref 39–117)
ALT SERPL W P-5'-P-CCNC: 8 U/L (ref 1–41)
ANION GAP SERPL CALCULATED.3IONS-SCNC: 7 MMOL/L (ref 5–15)
APTT PPP: 27.4 SECONDS (ref 22.7–35.4)
AST SERPL-CCNC: 9 U/L (ref 1–40)
BASOPHILS # BLD AUTO: 0.02 10*3/MM3 (ref 0–0.2)
BASOPHILS NFR BLD AUTO: 0.2 % (ref 0–1.5)
BILIRUB SERPL-MCNC: 0.4 MG/DL (ref 0–1.2)
BUN SERPL-MCNC: 28 MG/DL (ref 8–23)
BUN/CREAT SERPL: 24.8 (ref 7–25)
CALCIUM SPEC-SCNC: 9 MG/DL (ref 8.6–10.5)
CHLORIDE SERPL-SCNC: 103 MMOL/L (ref 98–107)
CO2 SERPL-SCNC: 28 MMOL/L (ref 22–29)
CREAT SERPL-MCNC: 1.13 MG/DL (ref 0.76–1.27)
DEPRECATED RDW RBC AUTO: 41.8 FL (ref 37–54)
EGFRCR SERPLBLD CKD-EPI 2021: 65.7 ML/MIN/1.73
EOSINOPHIL # BLD AUTO: 0.03 10*3/MM3 (ref 0–0.4)
EOSINOPHIL NFR BLD AUTO: 0.3 % (ref 0.3–6.2)
ERYTHROCYTE [DISTWIDTH] IN BLOOD BY AUTOMATED COUNT: 13.6 % (ref 12.3–15.4)
GLOBULIN UR ELPH-MCNC: 2.6 GM/DL
GLUCOSE BLDC GLUCOMTR-MCNC: 172 MG/DL (ref 70–130)
GLUCOSE BLDC GLUCOMTR-MCNC: 181 MG/DL (ref 70–130)
GLUCOSE SERPL-MCNC: 221 MG/DL (ref 65–99)
HCT VFR BLD AUTO: 36.9 % (ref 37.5–51)
HGB BLD-MCNC: 12.7 G/DL (ref 13–17.7)
INR PPP: 0.99 (ref 0.9–1.1)
LYMPHOCYTES # BLD AUTO: 1 10*3/MM3 (ref 0.7–3.1)
LYMPHOCYTES NFR BLD AUTO: 9.5 % (ref 19.6–45.3)
MCH RBC QN AUTO: 29.8 PG (ref 26.6–33)
MCHC RBC AUTO-ENTMCNC: 34.4 G/DL (ref 31.5–35.7)
MCV RBC AUTO: 86.6 FL (ref 79–97)
MONOCYTES # BLD AUTO: 0.71 10*3/MM3 (ref 0.1–0.9)
MONOCYTES NFR BLD AUTO: 6.7 % (ref 5–12)
NEUTROPHILS NFR BLD AUTO: 8.71 10*3/MM3 (ref 1.7–7)
NEUTROPHILS NFR BLD AUTO: 82.8 % (ref 42.7–76)
PLATELET # BLD AUTO: 159 10*3/MM3 (ref 140–450)
PMV BLD AUTO: 13.2 FL (ref 6–12)
POTASSIUM SERPL-SCNC: 4.4 MMOL/L (ref 3.5–5.2)
PROT SERPL-MCNC: 6.3 G/DL (ref 6–8.5)
PROTHROMBIN TIME: 13 SECONDS (ref 11.7–14.2)
RBC # BLD AUTO: 4.26 10*6/MM3 (ref 4.14–5.8)
SARS-COV-2 RNA RESP QL NAA+PROBE: NOT DETECTED
SODIUM SERPL-SCNC: 138 MMOL/L (ref 136–145)
WBC NRBC COR # BLD: 10.52 10*3/MM3 (ref 3.4–10.8)

## 2022-04-11 PROCEDURE — 72125 CT NECK SPINE W/O DYE: CPT

## 2022-04-11 PROCEDURE — 72110 X-RAY EXAM L-2 SPINE 4/>VWS: CPT

## 2022-04-11 PROCEDURE — 85610 PROTHROMBIN TIME: CPT | Performed by: EMERGENCY MEDICINE

## 2022-04-11 PROCEDURE — 82962 GLUCOSE BLOOD TEST: CPT

## 2022-04-11 PROCEDURE — 63710000001 INSULIN LISPRO (HUMAN) PER 5 UNITS: Performed by: INTERNAL MEDICINE

## 2022-04-11 PROCEDURE — U0003 INFECTIOUS AGENT DETECTION BY NUCLEIC ACID (DNA OR RNA); SEVERE ACUTE RESPIRATORY SYNDROME CORONAVIRUS 2 (SARS-COV-2) (CORONAVIRUS DISEASE [COVID-19]), AMPLIFIED PROBE TECHNIQUE, MAKING USE OF HIGH THROUGHPUT TECHNOLOGIES AS DESCRIBED BY CMS-2020-01-R: HCPCS | Performed by: EMERGENCY MEDICINE

## 2022-04-11 PROCEDURE — 85025 COMPLETE CBC W/AUTO DIFF WBC: CPT | Performed by: EMERGENCY MEDICINE

## 2022-04-11 PROCEDURE — 85730 THROMBOPLASTIN TIME PARTIAL: CPT | Performed by: EMERGENCY MEDICINE

## 2022-04-11 PROCEDURE — G0378 HOSPITAL OBSERVATION PER HR: HCPCS

## 2022-04-11 PROCEDURE — 80053 COMPREHEN METABOLIC PANEL: CPT | Performed by: EMERGENCY MEDICINE

## 2022-04-11 PROCEDURE — 70450 CT HEAD/BRAIN W/O DYE: CPT

## 2022-04-11 PROCEDURE — 99285 EMERGENCY DEPT VISIT HI MDM: CPT

## 2022-04-11 PROCEDURE — U0005 INFEC AGEN DETEC AMPLI PROBE: HCPCS | Performed by: EMERGENCY MEDICINE

## 2022-04-11 RX ORDER — ACETAMINOPHEN 650 MG/1
650 SUPPOSITORY RECTAL EVERY 4 HOURS PRN
Status: DISCONTINUED | OUTPATIENT
Start: 2022-04-11 | End: 2022-04-15 | Stop reason: HOSPADM

## 2022-04-11 RX ORDER — SODIUM CHLORIDE 0.9 % (FLUSH) 0.9 %
10 SYRINGE (ML) INJECTION AS NEEDED
Status: DISCONTINUED | OUTPATIENT
Start: 2022-04-11 | End: 2022-04-15 | Stop reason: HOSPADM

## 2022-04-11 RX ORDER — ONDANSETRON 2 MG/ML
4 INJECTION INTRAMUSCULAR; INTRAVENOUS EVERY 6 HOURS PRN
Status: DISCONTINUED | OUTPATIENT
Start: 2022-04-11 | End: 2022-04-15 | Stop reason: HOSPADM

## 2022-04-11 RX ORDER — ACETAMINOPHEN 160 MG/5ML
650 SOLUTION ORAL EVERY 4 HOURS PRN
Status: DISCONTINUED | OUTPATIENT
Start: 2022-04-11 | End: 2022-04-15 | Stop reason: HOSPADM

## 2022-04-11 RX ORDER — DEXTROSE MONOHYDRATE 25 G/50ML
25 INJECTION, SOLUTION INTRAVENOUS
Status: DISCONTINUED | OUTPATIENT
Start: 2022-04-11 | End: 2022-04-15 | Stop reason: HOSPADM

## 2022-04-11 RX ORDER — INSULIN LISPRO 100 [IU]/ML
0-9 INJECTION, SOLUTION INTRAVENOUS; SUBCUTANEOUS
Status: DISCONTINUED | OUTPATIENT
Start: 2022-04-11 | End: 2022-04-15 | Stop reason: HOSPADM

## 2022-04-11 RX ORDER — NICOTINE POLACRILEX 4 MG
15 LOZENGE BUCCAL
Status: DISCONTINUED | OUTPATIENT
Start: 2022-04-11 | End: 2022-04-15 | Stop reason: HOSPADM

## 2022-04-11 RX ORDER — ACETAMINOPHEN 325 MG/1
650 TABLET ORAL EVERY 4 HOURS PRN
Status: DISCONTINUED | OUTPATIENT
Start: 2022-04-11 | End: 2022-04-15 | Stop reason: HOSPADM

## 2022-04-11 RX ADMIN — INSULIN LISPRO 2 UNITS: 100 INJECTION, SOLUTION INTRAVENOUS; SUBCUTANEOUS at 20:32

## 2022-04-11 NOTE — H&P
HISTORY AND PHYSICAL   [facility]        Date of Admission: 2022  Patient Identification:  Name: Griffin Angeles  Age: 81 y.o.  Sex: male  :  1940  MRN: [id number]                     Primary Care Physician: Jeronimo Quevedo MD    Chief Complaint:  81 year old gentleman was brought in by his wife after a fall; he has a history of parkinsonism and has been falling every other day; his balance is terrible and he is getting weaker; his wife has been his caretaker for the last three years; he is nonverbal presently and not able to contribute to the history;     History of Present Illness:   As above    Past Medical History:  Past Medical History:   Diagnosis Date   • BPH (benign prostatic hypertrophy)    • CAD (coronary artery disease)    • Dementia ([facility])    • Hydrocephalus ([facility])    • Hyperlipidemia    • Hyperparathyroidism (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)    • Postherpetic neuralgia    • Shingles 2018   • Stroke ([facility])    • Type 2 diabetes mellitus ([facility])      Past Surgical History:  Past Surgical History:   Procedure Laterality Date   • CATARACT EXTRACTION Bilateral 2017   • COLONOSCOPY  2017    Chronic inflammation.  Dr. Benoit.  [facility]   • CORONARY ANGIOPLASTY WITH STENT PLACEMENT     • HEMORRHOIDECTOMY     • PARATHYROIDECTOMY     • PENILE PROSTHESIS IMPLANT      Dr. Love at [facility]   • RETINAL LASER PROCEDURE Right    • TURP / TRANSURETHRAL INCISION / DRAINAGE PROSTATE        Home Meds:  (Not in a hospital admission)      Allergies:  No Known Allergies  Immunizations:  Immunization History   Administered Date(s) Administered   • Fluzone High Dose =>65 Years ([employer] ONLY) 2015, 2017, 2018   • Pneumococcal Conjugate 13-Valent (PCV13) 2017   • Pneumococcal Polysaccharide (PPSV23) 2013, 2016   • Td, Not Adsorbed 2012  "    Social History:   Social History     Social History Narrative   • Not on file     Social History     Socioeconomic History   • Marital status:    Tobacco Use   • Smoking status: Former Smoker     Types: Pipe     Quit date:      Years since quittin.2   • Smokeless tobacco: Never Used   • Tobacco comment: SMOKED PIPE    Vaping Use   • Vaping Use: Never used   Substance and Sexual Activity   • Alcohol use: Not Currently     Comment: d/c   • Drug use: No   • Sexual activity: Defer       Family History:  Family History   Problem Relation Age of Onset   • Diabetes Mother    • Thyroid disease Mother    • Stroke Mother    • Diabetes Father    • Kidney failure Father    • Heart disease Sister         Review of Systems  Not obtainable from the patient  Objective:  T Max 24 hrs: Temp (24hrs), Av.3 °F (36.8 °C), Min:98.3 °F (36.8 °C), Max:98.3 °F (36.8 °C)    Vitals Ranges:   Temp:  [98.3 °F (36.8 °C)] 98.3 °F (36.8 °C)  Heart Rate:  [60-84] 76  Resp:  [18] 18  BP: (156-179)/(67-88) 156/72      Exam:  /72   Pulse 76   Temp 98.3 °F (36.8 °C) (Tympanic)   Resp 18   Ht 167.6 cm (66\")   Wt 59 kg (130 lb)   SpO2 97%   BMI 20.98 kg/m²     General Appearance:    Alert, cooperative, no distress, appears stated age; chronically ill appearing   Head:    Normocephalic, without obvious abnormality, atraumatic\; abrasion forehead   Eyes:    PERRL, conjunctivae/corneas clear, EOM's intact, both eyes   Ears:    Normal external ear canals, both ears   Nose:   Nares normal, septum midline, mucosa normal, no drainage    or sinus tenderness   Throat:   Lips, mucosa, and tongue normal   Neck:   Supple, symmetrical, trachea midline, no adenopathy;     thyroid:  no enlargement/tenderness/nodules; no carotid    bruit or JVD   Back:     Symmetric, no curvature, ROM normal, no CVA tenderness   Lungs:     Decreased breath sounds bilaterally, respirations unlabored   Chest Wall:    No tenderness or deformity    Heart: "    Regular rate and rhythm, S1 and S2 normal, no murmur, rub   or gallop   Abdomen:     Soft, nontender, bowel sounds active all four quadrants,     no masses, no hepatomegaly, no splenomegaly   Extremities:   Extremities normal, atraumatic, no cyanosis or edema   Pulses:   2+ and symmetric all extremities   Skin:   Skin color, texture, turgor normal, no rashes or lesions   Lymph nodes:   Cervical, supraclavicular, and axillary nodes normal   Neurologic:   CNII-XII intact, normal strength, sensation intact throughout      .    Data Review:  Labs in chart were reviewed.  WBC   Date Value Ref Range Status   04/11/2022 10.52 3.40 - 10.80 10*3/mm3 Final     Hemoglobin   Date Value Ref Range Status   04/11/2022 12.7 (L) 13.0 - 17.7 g/dL Final     Hematocrit   Date Value Ref Range Status   04/11/2022 36.9 (L) 37.5 - 51.0 % Final     Platelets   Date Value Ref Range Status   04/11/2022 159 140 - 450 10*3/mm3 Final     Sodium   Date Value Ref Range Status   04/11/2022 138 136 - 145 mmol/L Final     Potassium   Date Value Ref Range Status   04/11/2022 4.4 3.5 - 5.2 mmol/L Final     Comment:     Slight hemolysis detected by analyzer. Results may be affected.     Chloride   Date Value Ref Range Status   04/11/2022 103 98 - 107 mmol/L Final     CO2   Date Value Ref Range Status   04/11/2022 28.0 22.0 - 29.0 mmol/L Final     BUN   Date Value Ref Range Status   04/11/2022 28 (H) 8 - 23 mg/dL Final     Creatinine   Date Value Ref Range Status   04/11/2022 1.13 0.76 - 1.27 mg/dL Final     Glucose   Date Value Ref Range Status   04/11/2022 221 (H) 65 - 99 mg/dL Final     Calcium   Date Value Ref Range Status   04/11/2022 9.0 8.6 - 10.5 mg/dL Final     AST (SGOT)   Date Value Ref Range Status   04/11/2022 9 1 - 40 U/L Final     ALT (SGPT)   Date Value Ref Range Status   04/11/2022 8 1 - 41 U/L Final     Alkaline Phosphatase   Date Value Ref Range Status   04/11/2022 279 (H) 39 - 117 U/L Final     INR   Date Value Ref Range Status    04/11/2022 0.99 0.90 - 1.10 Final                Imaging Results (All)     Procedure Component Value Units Date/Time    XR Spine Lumbar Complete 4+VW [[id number]] Collected: 04/11/22 1345     Updated: 04/11/22 1351    Narrative:      LUMBAR SPINE AP LATERAL BILATERAL OBLIQUE, SPOT LUMBOSACRAL     HISTORY: Patient fell forward. Back pain.     FINDINGS: There is multilevel endplate spur formation throughout the  anterior lower thoracic spine and lumbar spine. The vertebral body  heights appear within normal limits. No fracture plane is demonstrated.  There is mild disc space narrowing within the upper lumbar spine at L1-2  and L2-3. Facet arthritis is present in the lower lumbar spine. There is  a mild scoliotic curvature that is convexed to the left at L3-4.  Calcifications overlie the left kidney consistent with renal stones and  the largest measures approximately 9 mm.       Impression:      1.  Levoscoliotic curvature of the lumbar spine with degenerative disc  disease and facet arthritis. No evidence for fracture or acute  abnormality of the lumbar spine.  2.  Left renal stones.     This report was finalized on 4/11/2022 1:48 PM by Dr. Tiago French M.D.       CT Head Without Contrast [[id number]] Collected: 04/11/22 1214     Updated: 04/11/22 1225    Narrative:      CT HEAD AND CERVICAL SPINE WITHOUT CONTRAST     CLINICAL HISTORY: Fell with laceration on the nose. Headache and neck  pain.     TECHNIQUE: CT scan of the head was obtained with 2 mm axial bone  algorithm and 3 mm axial soft tissue algorithm images. Sagittal and  coronal reconstructed images were obtained.     COMPARISON: Comparison is made to previous CT scan of the head dated  02/28/2020.     FINDINGS:     There is no evidence for a calvarial fracture. There is no evidence for  an acute extra-axial hemorrhage.     The ventricles are relatively prominent in size as compared to the sulci  near the vertex. These findings raise concern for the  possibility of  normal pressure hydrocephalus in the appropriate clinical setting and  correlation with clinical data is suggested. When compared to the  previous exam, the ventricular system is stable to perhaps slightly  larger in size. There are mild-to-moderate changes of chronic small  vessel ischemic phenomena. There is a chronic lacune within the right  corona radiata which measures up to approximately 6 mm in diameter. Old  lacunar disease is also noted within the lentiform nuclei.       Impression:         No evidence for acute traumatic intracranial pathology.     The ventricles are system is relatively prominent in size as compared to  the size of the sulci near the vertex raising concern for normal  pressure hydrocephalus in the appropriate clinical setting and  correlation with clinical data is suggested. When compared to the  previous examination dated 02/28/2020, the ventricular system is stable  to perhaps minimally larger in size.     Incidental note is made of mild to moderate changes of chronic small  vessel ischemic phenomena and old lacunar disease.        TECHNIQUE: CT scan of the cervical spine was obtained with 1 mm axial  bone algorithm and 2 mm axial soft tissue algorithm images. Sagittal and  coronal reconstructed images were obtained.     FINDINGS:     There is no evidence for acute fracture or bony malalignment within the  cervical spine.     Disc osteophyte complexes at C5-6 and C6-7 result in relatively mild  degrees of canal narrowing. Uncovertebral joint hypertrophy at C5-6 and  C6-7 result in relatively mild degrees of foraminal narrowing.  Mild-to-moderate bilateral foraminal narrowing is seen at C3-4 secondary  to a combination of uncovertebral joint hypertrophy and facet arthrosis  and a disc osteophyte complex at C3-4 results in a mild degree of canal  narrowing.     Incidental note is made of a stable 6 mm nodular focus within the right  lung apex that is unchanged in  appearance when compared to the prior  cervical spine CT study dated 03/05/2017. The stability favors a benign  etiology.     IMPRESSION:     No evidence for acute fracture or bony malalignment involving the  cervical spine.     Multilevel degenerative phenomena within the cervical spine are  incidentally noted and discussed in detail above.        Radiation dose reduction techniques were utilized, including automated  exposure control and exposure modulation based on body size.     This report was finalized on 4/11/2022 12:22 PM by Dr. Gerardo Ledesma M.D.       CT Cervical Spine Without Contrast [[id number]] Collected: 04/11/22 1214     Updated: 04/11/22 1225    Narrative:      CT HEAD AND CERVICAL SPINE WITHOUT CONTRAST     CLINICAL HISTORY: Fell with laceration on the nose. Headache and neck  pain.     TECHNIQUE: CT scan of the head was obtained with 2 mm axial bone  algorithm and 3 mm axial soft tissue algorithm images. Sagittal and  coronal reconstructed images were obtained.     COMPARISON: Comparison is made to previous CT scan of the head dated  02/28/2020.     FINDINGS:     There is no evidence for a calvarial fracture. There is no evidence for  an acute extra-axial hemorrhage.     The ventricles are relatively prominent in size as compared to the sulci  near the vertex. These findings raise concern for the possibility of  normal pressure hydrocephalus in the appropriate clinical setting and  correlation with clinical data is suggested. When compared to the  previous exam, the ventricular system is stable to perhaps slightly  larger in size. There are mild-to-moderate changes of chronic small  vessel ischemic phenomena. There is a chronic lacune within the right  corona radiata which measures up to approximately 6 mm in diameter. Old  lacunar disease is also noted within the lentiform nuclei.       Impression:         No evidence for acute traumatic intracranial pathology.     The ventricles are system is  relatively prominent in size as compared to  the size of the sulci near the vertex raising concern for normal  pressure hydrocephalus in the appropriate clinical setting and  correlation with clinical data is suggested. When compared to the  previous examination dated 02/28/2020, the ventricular system is stable  to perhaps minimally larger in size.     Incidental note is made of mild to moderate changes of chronic small  vessel ischemic phenomena and old lacunar disease.        TECHNIQUE: CT scan of the cervical spine was obtained with 1 mm axial  bone algorithm and 2 mm axial soft tissue algorithm images. Sagittal and  coronal reconstructed images were obtained.     FINDINGS:     There is no evidence for acute fracture or bony malalignment within the  cervical spine.     Disc osteophyte complexes at C5-6 and C6-7 result in relatively mild  degrees of canal narrowing. Uncovertebral joint hypertrophy at C5-6 and  C6-7 result in relatively mild degrees of foraminal narrowing.  Mild-to-moderate bilateral foraminal narrowing is seen at C3-4 secondary  to a combination of uncovertebral joint hypertrophy and facet arthrosis  and a disc osteophyte complex at C3-4 results in a mild degree of canal  narrowing.     Incidental note is made of a stable 6 mm nodular focus within the right  lung apex that is unchanged in appearance when compared to the prior  cervical spine CT study dated 03/05/2017. The stability favors a benign  etiology.     IMPRESSION:     No evidence for acute fracture or bony malalignment involving the  cervical spine.     Multilevel degenerative phenomena within the cervical spine are  incidentally noted and discussed in detail above.        Radiation dose reduction techniques were utilized, including automated  exposure control and exposure modulation based on body size.     This report was finalized on 4/11/2022 12:22 PM by Dr. Gerardo Ledesma M.D.               Assessment:  Active Hospital Problems     Diagnosis  POA   • Parkinson's disease (HCC) [G20]  Yes      Resolved Hospital Problems   No resolved problems to display.   falls  Diabetes  Dementia  Weakness  Cad  anemia    Plan:  accu checks, insulin sliding scale  Pt/ot to see  Will need rehab to see if his functional status can improve  Monitor bp  NHANw patient, wife and ED provider    Emely Mckinley MD  4/11/2022  17:13 EDT

## 2022-04-11 NOTE — CASE MANAGEMENT/SOCIAL WORK
Discharge Planning Assessment  [facility]     Patient Name: Griffin Angeles  MRN: [id number]  Today's Date: 4/11/2022    Admit Date: 4/11/2022     Discharge Needs Assessment     Row Name 04/11/22 1625       Living Environment    People in Home spouse    Name(s) of People in Home Zohra Angeles (spouse)    Current Living Arrangements home    Primary Care Provided by spouse/significant other    Provides Primary Care For no one, unable/limited ability to care for self    Family Caregiver if Needed spouse    Quality of Family Relationships involved;helpful;supportive;stressful    Able to Return to Prior Arrangements --  to be determined- home vs SNF       Resource/Environmental Concerns    Resource/Environmental Concerns none       Transition Planning    Patient/Family Anticipates Transition to home with help/services;inpatient rehabilitation facility    Patient/Family Anticipated Services at Transition ;skilled nursing;home health care    Transportation Anticipated family or friend will provide       Discharge Needs Assessment    Equipment Currently Used at Home walker, rolling;grab bar;shower chair;wheelchair    Concerns to be Addressed discharge planning;decision-making;home safety    Anticipated Changes Related to Illness inability to care for self    Equipment Needed After Discharge none    Outpatient/Agency/Support Group Needs homecare agency;skilled nursing facility;inpatient rehabilitation facility    Discharge Facility/Level of Care Needs home with home health;nursing facility, skilled    Provided Post Acute Provider List? Yes    Delivered To Patient;Support Person    Support Person spouse- Zohra    Method of Delivery In person    Current Discharge Risk physical impairment;dependent with mobility/activities of daily living               Discharge Plan     Row Name 04/11/22 1608       Plan    Plan Comments Entered room, introduced self and explained role w/PPE in place on self, patient, spouse  and daughter at bedside; received permission to speak in front of family; verified information on facesheet; patient lives in a house w/spouse who is primary caregiver; patient ambulates w/assist of walker- has assistance w/all ADL's- patient has been having frequent falls and spouse verbalizes difficulty with caregiving at this time; spouse reports grab bars, bed rail, electric w/c and walker for DME; Discusse d/c concerns regarding safety and family is interested in home w/assistance vs short term rehab;- patient isnt interested in long term care at this time; pt and family will discuss further to determine preference for referrals; Provided RTR and personal care list for review; CCP to follow for further d/c planning.              Continued Care and Services - Admitted Since 4/11/2022    Coordination has not been started for this encounter.          Demographic Summary     Row Name 04/11/22 1621       General Information    Admission Type observation    Arrived From home    Required Notices Provided Observation Status Notice    Reason for Consult decision-making;discharge planning    Preferred Language English       Contact Information    Permission Granted to Share Info With ;family/designee               Functional Status     Row Name 04/11/22 1624       Functional Status    Usual Activity Tolerance fair    Current Activity Tolerance poor       Functional Status, IADL    Medications assistive person    Meal Preparation assistive person    Housekeeping assistive person    Laundry assistive person    Shopping assistive person       Employment/    Employment Status retired;, previous service            [employer]               Psychosocial    No documentation.                Abuse/Neglect    No documentation.                Legal    No documentation.                Substance Abuse    No documentation.                Patient Forms    No documentation.                    Linsey Valentine RN

## 2022-04-11 NOTE — ED PROVIDER NOTES
EMERGENCY DEPARTMENT ENCOUNTER    Room Number:  20/20  Date seen:  4/11/2022  PCP: Jeronimo Quevedo MD  Historian: Patient, spouse      HPI:  Chief Complaint: Fall, head trauma, back pain  A complete HPI/ROS/PMH/PSH/SH/FH are unobtainable due to: Nothing  Context: Griffin Angeles is a 81 y.o. male who presents to the ED c/o head trauma and low back pain after a fall today.  Patient has a history of Parkinson's disease.  He resides at home with his wife.  He requires a lot of assistance with his ADLs.  Wife was trying to get him cleaned up as he had missed his brief and she was trying to get him a shower when he fell and struck his head.  He sustained a superficial abrasion to his nose.  He did not suffer loss of consciousness.  He is on Plavix.  He is complaining of some mild right lower back pain.  Wife reports that he fell last night as well.  He uses a walker at baseline.  He has a wheelchair but they are not sure that it will fit through the door at their home.  Wife has been doing her best to try to care for him but it has been really challenging.  He falls frequently.  He has a history of coronary disease, previous strokes, diabetes, Parkinson's.            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[facility]) 11/14/2016   • Type 1 diabetes mellitus with proteinuria ([facility]) 05/15/2017   • Chronic nonspecific colitis 09/21/2017   • Multiple lacunar infarcts ([facility]) 01/31/2018   • Type 1 diabetes mellitus  with complications (HCC) 2018   • Cholesterol retinal embolus of left eye 2019     Resolved Ambulatory Problems     Diagnosis Date Noted   • Celiac disease 2019     Past Medical History:   Diagnosis Date   • BPH (benign prostatic hypertrophy)    • Dementia (HCC)    • Hydrocephalus (HCC)    • Hyperparathyroidism (HCC)    • Hypertension    • Hypothyroidism    • Lacunar infarction (HCC)    • Parkinsonism (HCC)    • Shingles 2018   • Stroke (HCC)    • Type 2 diabetes mellitus (HCC)          PAST SURGICAL HISTORY  Past Surgical History:   Procedure Laterality Date   • CATARACT EXTRACTION Bilateral 2017   • COLONOSCOPY  2017    Chronic inflammation.  Dr. Benoit.  [facility]   • CORONARY ANGIOPLASTY WITH STENT PLACEMENT     • HEMORRHOIDECTOMY     • PARATHYROIDECTOMY     • PENILE PROSTHESIS IMPLANT      Dr. [facility] at [facility]   • RETINAL LASER PROCEDURE Right    • TURP / TRANSURETHRAL INCISION / DRAINAGE PROSTATE           FAMILY HISTORY  Family History   Problem Relation Age of Onset   • Diabetes Mother    • Thyroid disease Mother    • Stroke Mother    • Diabetes Father    • Kidney failure Father    • Heart disease Sister          SOCIAL HISTORY  Social History     Socioeconomic History   • Marital status:    Tobacco Use   • Smoking status: Former Smoker     Types: Pipe     Quit date:      Years since quittin.2   • Smokeless tobacco: Never Used   • Tobacco comment: SMOKED PIPE    Vaping Use   • Vaping Use: Never used   Substance and Sexual Activity   • Alcohol use: Not Currently     Comment: d/c   • Drug use: No   • Sexual activity: Defer         ALLERGIES  Patient has no known allergies.        REVIEW OF SYSTEMS  Review of Systems   Review of all 14 systems is negative other than stated in the HPI above.      PHYSICAL EXAM  ED Triage Vitals [22 1034]   Temp Heart Rate Resp BP SpO2   98.3 °F (36.8 °C) 60 18 179/67 98 %      Temp src Heart Rate Source  Patient Position BP Location FiO2 (%)   Tympanic Monitor Lying -- --         GENERAL: Awake and alert, no acute distress  HENT: nares patent, small hematoma of the forehead with a superficial abrasion on the bridge of the nose with no active bleeding.  No blood from the nares bilaterally.  Midface is stable.  Dentition appears normal.  EYES: no scleral icterus, pupils 3 mm reactive bilaterally, EOMI  CV: regular rhythm, normal rate  RESPIRATORY: normal effort, lungs clear auscultation bilaterally  ABDOMEN: soft, nondistended, nontender throughout  MUSCULOSKELETAL: no deformity, no midline C/T/L-spine tenderness.  There is right lumbar paraspinous tenderness.  NEURO: alert, moves all extremities with normal strength, follows commands, cranial nerves II through XII grossly intact  PSYCH:  calm, cooperative  SKIN: warm, dry, linear abrasion on the bridge of the nose measuring about 2 cm    Vital signs and nursing notes reviewed.          LAB RESULTS  Recent Results (from the past 24 hour(s))   COVID-19,[facility] IN-HOUSE CEPHEID/[facility]USTINO NP SWAB IN TRANSPORT MEDIA 8-12 HR TAT - Swab, Nasopharynx    Collection Time: 04/11/22 12:46 PM    Specimen: Nasopharynx; Swab   Result Value Ref Range    COVID19 Not Detected Not Detected - Ref. Range   Comprehensive Metabolic Panel    Collection Time: 04/11/22 12:47 PM    Specimen: Blood   Result Value Ref Range    Glucose 221 (H) 65 - 99 mg/dL    BUN 28 (H) 8 - 23 mg/dL    Creatinine 1.13 0.76 - 1.27 mg/dL    Sodium 138 136 - 145 mmol/L    Potassium 4.4 3.5 - 5.2 mmol/L    Chloride 103 98 - 107 mmol/L    CO2 28.0 22.0 - 29.0 mmol/L    Calcium 9.0 8.6 - 10.5 mg/dL    Total Protein 6.3 6.0 - 8.5 g/dL    Albumin 3.70 3.50 - 5.20 g/dL    ALT (SGPT) 8 1 - 41 U/L    AST (SGOT) 9 1 - 40 U/L    Alkaline Phosphatase 279 (H) 39 - 117 U/L    Total Bilirubin 0.4 0.0 - 1.2 mg/dL    Globulin 2.6 gm/dL    A/G Ratio 1.4 g/dL    BUN/Creatinine Ratio 24.8 7.0 - 25.0    Anion Gap 7.0 5.0 - 15.0 mmol/L     eGFR 65.7 >60.0 mL/min/1.73   Protime-INR    Collection Time: 04/11/22 12:47 PM    Specimen: Blood   Result Value Ref Range    Protime 13.0 11.7 - 14.2 Seconds    INR 0.99 0.90 - 1.10   aPTT    Collection Time: 04/11/22 12:47 PM    Specimen: Blood   Result Value Ref Range    PTT 27.4 22.7 - 35.4 seconds   CBC Auto Differential    Collection Time: 04/11/22 12:47 PM    Specimen: Blood   Result Value Ref Range    WBC 10.52 3.40 - 10.80 10*3/mm3    RBC 4.26 4.14 - 5.80 10*6/mm3    Hemoglobin 12.7 (L) 13.0 - 17.7 g/dL    Hematocrit 36.9 (L) 37.5 - 51.0 %    MCV 86.6 79.0 - 97.0 fL    MCH 29.8 26.6 - 33.0 pg    MCHC 34.4 31.5 - 35.7 g/dL    RDW 13.6 12.3 - 15.4 %    RDW-SD 41.8 37.0 - 54.0 fl    MPV 13.2 (H) 6.0 - 12.0 fL    Platelets 159 140 - 450 10*3/mm3    Neutrophil % 82.8 (H) 42.7 - 76.0 %    Lymphocyte % 9.5 (L) 19.6 - 45.3 %    Monocyte % 6.7 5.0 - 12.0 %    Eosinophil % 0.3 0.3 - 6.2 %    Basophil % 0.2 0.0 - 1.5 %    Neutrophils, Absolute 8.71 (H) 1.70 - 7.00 10*3/mm3    Lymphocytes, Absolute 1.00 0.70 - 3.10 10*3/mm3    Monocytes, Absolute 0.71 0.10 - 0.90 10*3/mm3    Eosinophils, Absolute 0.03 0.00 - 0.40 10*3/mm3    Basophils, Absolute 0.02 0.00 - 0.20 10*3/mm3       Ordered the above labs and reviewed the results.        RADIOLOGY  CT Head Without Contrast, CT Cervical Spine Without Contrast    Result Date: 4/11/2022  CT HEAD AND CERVICAL SPINE WITHOUT CONTRAST  CLINICAL HISTORY: Fell with laceration on the nose. Headache and neck pain.  TECHNIQUE: CT scan of the head was obtained with 2 mm axial bone algorithm and 3 mm axial soft tissue algorithm images. Sagittal and coronal reconstructed images were obtained.  COMPARISON: Comparison is made to previous CT scan of the head dated 02/28/2020.  FINDINGS:  There is no evidence for a calvarial fracture. There is no evidence for an acute extra-axial hemorrhage.  The ventricles are relatively prominent in size as compared to the sulci near the vertex. These  findings raise concern for the possibility of normal pressure hydrocephalus in the appropriate clinical setting and correlation with clinical data is suggested. When compared to the previous exam, the ventricular system is stable to perhaps slightly larger in size. There are mild-to-moderate changes of chronic small vessel ischemic phenomena. There is a chronic lacune within the right corona radiata which measures up to approximately 6 mm in diameter. Old lacunar disease is also noted within the lentiform nuclei.       No evidence for acute traumatic intracranial pathology.  The ventricles are system is relatively prominent in size as compared to the size of the sulci near the vertex raising concern for normal pressure hydrocephalus in the appropriate clinical setting and correlation with clinical data is suggested. When compared to the previous examination dated 02/28/2020, the ventricular system is stable to perhaps minimally larger in size.  Incidental note is made of mild to moderate changes of chronic small vessel ischemic phenomena and old lacunar disease.   TECHNIQUE: CT scan of the cervical spine was obtained with 1 mm axial bone algorithm and 2 mm axial soft tissue algorithm images. Sagittal and coronal reconstructed images were obtained.  FINDINGS:  There is no evidence for acute fracture or bony malalignment within the cervical spine.  Disc osteophyte complexes at C5-6 and C6-7 result in relatively mild degrees of canal narrowing. Uncovertebral joint hypertrophy at C5-6 and C6-7 result in relatively mild degrees of foraminal narrowing. Mild-to-moderate bilateral foraminal narrowing is seen at C3-4 secondary to a combination of uncovertebral joint hypertrophy and facet arthrosis and a disc osteophyte complex at C3-4 results in a mild degree of canal narrowing.  Incidental note is made of a stable 6 mm nodular focus within the right lung apex that is unchanged in appearance when compared to the prior  cervical spine CT study dated 03/05/2017. The stability favors a benign etiology.  IMPRESSION:  No evidence for acute fracture or bony malalignment involving the cervical spine.  Multilevel degenerative phenomena within the cervical spine are incidentally noted and discussed in detail above.   Radiation dose reduction techniques were utilized, including automated exposure control and exposure modulation based on body size.  This report was finalized on 4/11/2022 12:22 PM by Dr. Gerardo Ledesma M.D.      XR Spine Lumbar Complete 4+VW    Result Date: 4/11/2022  LUMBAR SPINE AP LATERAL BILATERAL OBLIQUE, SPOT LUMBOSACRAL  HISTORY: Patient fell forward. Back pain.  FINDINGS: There is multilevel endplate spur formation throughout the anterior lower thoracic spine and lumbar spine. The vertebral body heights appear within normal limits. No fracture plane is demonstrated. There is mild disc space narrowing within the upper lumbar spine at L1-2 and L2-3. Facet arthritis is present in the lower lumbar spine. There is a mild scoliotic curvature that is convexed to the left at L3-4. Calcifications overlie the left kidney consistent with renal stones and the largest measures approximately 9 mm.      1.  Levoscoliotic curvature of the lumbar spine with degenerative disc disease and facet arthritis. No evidence for fracture or acute abnormality of the lumbar spine. 2.  Left renal stones.  This report was finalized on 4/11/2022 1:48 PM by Dr. Tiago French M.D.        Ordered the above noted radiological studies. Reviewed by me in PACS.            PROCEDURES  Procedures            MEDICATIONS GIVEN IN ER  Medications   sodium chloride 0.9 % flush 10 mL (has no administration in time range)   acetaminophen (TYLENOL) tablet 650 mg (has no administration in time range)     Or   acetaminophen (TYLENOL) 160 MG/5ML solution 650 mg (has no administration in time range)     Or   acetaminophen (TYLENOL) suppository 650 mg (has no  administration in time range)   ondansetron (ZOFRAN) injection 4 mg (has no administration in time range)   dextrose (GLUTOSE) oral gel 15 g (has no administration in time range)   dextrose (D50W) (25 g/50 mL) IV injection 25 g (has no administration in time range)   glucagon (human recombinant) (GLUCAGEN DIAGNOSTIC) injection 1 mg (has no administration in time range)   insulin lispro (ADMELOG) injection 0-9 Units (has no administration in time range)                   MEDICAL DECISION MAKING, PROGRESS, and CONSULTS    All labs have been independently reviewed by me.  All radiology studies have been reviewed by me and discussed with radiologist dictating the report.   EKG's independently viewed and interpreted by me.  Discussion below represents my analysis of pertinent findings related to patient's condition, differential diagnosis, treatment plan and final disposition.      Differential diagnosis includes but is not limited to:  Traumatic intracranial hemorrhage  Skull fracture  Cervical spine fracture  Lumbar spine fracture  Lumbar disc herniation  Lumbosacral strain      ED Course as of 04/11/22 1840   Mon Apr 11, 2022   1447 Discussed with Dr. [facility], who agrees to admit. [JR]   1447 I discussed with patient and family the plan for admission.  My concern is that he is having increased mobility issues related to his advancing Parkinson's disease.  He is fallen twice in the last 24 hours.  Fortunately he has not yet suffered a head bleed or fracture extremity.  I think he would benefit from PT evaluation and potentially short-term rehab or skilled nursing placement. [JR]      ED Course User Index  [JR] Cy Wang MD              I wore an N95 mask, face shield, and gloves during this patient encounter.  Patient also wearing a surgical mask.  Hand hygeine performed before and after seeing the patient.    DIAGNOSIS  Final diagnoses:   Parkinson's disease (HCC)   Frequent falls   Abrasion of nose,  initial encounter   Minor head injury, initial encounter         DISPOSITION  ADMIT            Latest Documented Vital Signs:  As of 18:40 EDT  BP- 156/72 HR- 76 Temp- 98.3 °F (36.8 °C) (Tympanic) O2 sat- 97%        --    Please note that portions of this were completed with a voice recognition program.          Cy Wang MD  04/11/22 6214

## 2022-04-11 NOTE — ED TRIAGE NOTES
Pt arrives via EMS from home. States that he was sitting on the toilet this morning when he fell forward and hit his head on the tile floor. Pt has an abrasion to his forehead. Is on blood thinners but denies LOC. Denies pain as well.     Patient masked at arrival and triage staff wore all appropriate PPE during entire encounter with patient.

## 2022-04-12 PROBLEM — R11.2 NAUSEA VOMITING AND DIARRHEA: Status: ACTIVE | Noted: 2022-04-12
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
## 2022-04-12 PROBLEM — R19.7 NAUSEA VOMITING AND DIARRHEA: Status: ACTIVE | Noted: 2022-04-12

## 2022-04-12 LAB
ANION GAP SERPL CALCULATED.3IONS-SCNC: 9.3 MMOL/L (ref 5–15)
BASOPHILS # BLD AUTO: 0.05 10*3/MM3 (ref 0–0.2)
BASOPHILS NFR BLD AUTO: 0.5 % (ref 0–1.5)
BUN SERPL-MCNC: 21 MG/DL (ref 8–23)
BUN/CREAT SERPL: 17.9 (ref 7–25)
CALCIUM SPEC-SCNC: 9.2 MG/DL (ref 8.6–10.5)
CHLORIDE SERPL-SCNC: 104 MMOL/L (ref 98–107)
CO2 SERPL-SCNC: 23.7 MMOL/L (ref 22–29)
CREAT SERPL-MCNC: 1.17 MG/DL (ref 0.76–1.27)
DEPRECATED RDW RBC AUTO: 44.2 FL (ref 37–54)
EGFRCR SERPLBLD CKD-EPI 2021: 63 ML/MIN/1.73
EOSINOPHIL # BLD AUTO: 0.13 10*3/MM3 (ref 0–0.4)
EOSINOPHIL NFR BLD AUTO: 1.3 % (ref 0.3–6.2)
ERYTHROCYTE [DISTWIDTH] IN BLOOD BY AUTOMATED COUNT: 13.6 % (ref 12.3–15.4)
GLUCOSE BLDC GLUCOMTR-MCNC: 142 MG/DL (ref 70–130)
GLUCOSE BLDC GLUCOMTR-MCNC: 289 MG/DL (ref 70–130)
GLUCOSE BLDC GLUCOMTR-MCNC: 294 MG/DL (ref 70–130)
GLUCOSE BLDC GLUCOMTR-MCNC: 327 MG/DL (ref 70–130)
GLUCOSE SERPL-MCNC: 158 MG/DL (ref 65–99)
HBA1C MFR BLD: 7 % (ref 4.8–5.6)
HCT VFR BLD AUTO: 38.4 % (ref 37.5–51)
HGB BLD-MCNC: 12.5 G/DL (ref 13–17.7)
LYMPHOCYTES # BLD AUTO: 1.39 10*3/MM3 (ref 0.7–3.1)
LYMPHOCYTES NFR BLD AUTO: 13.7 % (ref 19.6–45.3)
MCH RBC QN AUTO: 29 PG (ref 26.6–33)
MCHC RBC AUTO-ENTMCNC: 32.6 G/DL (ref 31.5–35.7)
MCV RBC AUTO: 89.1 FL (ref 79–97)
MONOCYTES # BLD AUTO: 0.8 10*3/MM3 (ref 0.1–0.9)
MONOCYTES NFR BLD AUTO: 7.9 % (ref 5–12)
NEUTROPHILS NFR BLD AUTO: 7.71 10*3/MM3 (ref 1.7–7)
NEUTROPHILS NFR BLD AUTO: 76.1 % (ref 42.7–76)
PLATELET # BLD AUTO: 181 10*3/MM3 (ref 140–450)
PMV BLD AUTO: 12.9 FL (ref 6–12)
POTASSIUM SERPL-SCNC: 4.6 MMOL/L (ref 3.5–5.2)
RBC # BLD AUTO: 4.31 10*6/MM3 (ref 4.14–5.8)
SODIUM SERPL-SCNC: 137 MMOL/L (ref 136–145)
WBC NRBC COR # BLD: 10.13 10*3/MM3 (ref 3.4–10.8)

## 2022-04-12 PROCEDURE — 85025 COMPLETE CBC W/AUTO DIFF WBC: CPT | Performed by: INTERNAL MEDICINE

## 2022-04-12 PROCEDURE — 36415 COLL VENOUS BLD VENIPUNCTURE: CPT | Performed by: INTERNAL MEDICINE

## 2022-04-12 PROCEDURE — 80048 BASIC METABOLIC PNL TOTAL CA: CPT | Performed by: INTERNAL MEDICINE

## 2022-04-12 PROCEDURE — 82962 GLUCOSE BLOOD TEST: CPT

## 2022-04-12 PROCEDURE — 99222 1ST HOSP IP/OBS MODERATE 55: CPT | Performed by: PSYCHIATRY & NEUROLOGY

## 2022-04-12 PROCEDURE — 83036 HEMOGLOBIN GLYCOSYLATED A1C: CPT | Performed by: INTERNAL MEDICINE

## 2022-04-12 PROCEDURE — 63710000001 INSULIN LISPRO (HUMAN) PER 5 UNITS: Performed by: INTERNAL MEDICINE

## 2022-04-12 PROCEDURE — G0378 HOSPITAL OBSERVATION PER HR: HCPCS

## 2022-04-12 RX ORDER — MELATONIN
1000 DAILY
Status: DISCONTINUED | OUTPATIENT
Start: 2022-04-12 | End: 2022-04-15 | Stop reason: HOSPADM

## 2022-04-12 RX ORDER — LOSARTAN POTASSIUM 50 MG/1
50 TABLET ORAL DAILY
Status: DISCONTINUED | OUTPATIENT
Start: 2022-04-12 | End: 2022-04-15 | Stop reason: HOSPADM

## 2022-04-12 RX ORDER — METOPROLOL SUCCINATE 25 MG/1
25 TABLET, EXTENDED RELEASE ORAL DAILY
Status: DISCONTINUED | OUTPATIENT
Start: 2022-04-12 | End: 2022-04-15 | Stop reason: HOSPADM

## 2022-04-12 RX ORDER — LEVOTHYROXINE SODIUM 88 UG/1
88 TABLET ORAL DAILY
Status: DISCONTINUED | OUTPATIENT
Start: 2022-04-12 | End: 2022-04-15 | Stop reason: HOSPADM

## 2022-04-12 RX ORDER — FLUTICASONE PROPIONATE 50 MCG
2 SPRAY, SUSPENSION (ML) NASAL DAILY
Status: DISCONTINUED | OUTPATIENT
Start: 2022-04-12 | End: 2022-04-15 | Stop reason: HOSPADM

## 2022-04-12 RX ORDER — ESCITALOPRAM OXALATE 5 MG/1
5 TABLET ORAL DAILY
Status: DISCONTINUED | OUTPATIENT
Start: 2022-04-12 | End: 2022-04-15 | Stop reason: HOSPADM

## 2022-04-12 RX ORDER — ATORVASTATIN CALCIUM 20 MG/1
20 TABLET, FILM COATED ORAL DAILY
Status: DISCONTINUED | OUTPATIENT
Start: 2022-04-12 | End: 2022-04-13

## 2022-04-12 RX ORDER — ISOSORBIDE MONONITRATE 30 MG/1
30 TABLET, EXTENDED RELEASE ORAL DAILY
Status: DISCONTINUED | OUTPATIENT
Start: 2022-04-12 | End: 2022-04-15 | Stop reason: HOSPADM

## 2022-04-12 RX ORDER — DONEPEZIL HYDROCHLORIDE 10 MG/1
10 TABLET, FILM COATED ORAL NIGHTLY
Status: DISCONTINUED | OUTPATIENT
Start: 2022-04-12 | End: 2022-04-15 | Stop reason: HOSPADM

## 2022-04-12 RX ORDER — TAMSULOSIN HYDROCHLORIDE 0.4 MG/1
0.4 CAPSULE ORAL DAILY
Status: DISCONTINUED | OUTPATIENT
Start: 2022-04-12 | End: 2022-04-15 | Stop reason: HOSPADM

## 2022-04-12 RX ORDER — ENTACAPONE 200 MG/1
200 TABLET ORAL
Status: DISCONTINUED | OUTPATIENT
Start: 2022-04-12 | End: 2022-04-15 | Stop reason: HOSPADM

## 2022-04-12 RX ADMIN — CARBIDOPA AND LEVODOPA 2 TABLET: 25; 100 TABLET ORAL at 17:15

## 2022-04-12 RX ADMIN — DONEPEZIL HYDROCHLORIDE 10 MG: 10 TABLET, FILM COATED ORAL at 20:31

## 2022-04-12 RX ADMIN — Medication 1000 UNITS: at 10:13

## 2022-04-12 RX ADMIN — LOSARTAN POTASSIUM 50 MG: 50 TABLET, FILM COATED ORAL at 10:12

## 2022-04-12 RX ADMIN — CARBIDOPA AND LEVODOPA 2 TABLET: 25; 100 TABLET ORAL at 11:53

## 2022-04-12 RX ADMIN — LEVOTHYROXINE SODIUM 88 MCG: 0.09 TABLET ORAL at 10:14

## 2022-04-12 RX ADMIN — METOPROLOL SUCCINATE 25 MG: 25 TABLET, EXTENDED RELEASE ORAL at 11:55

## 2022-04-12 RX ADMIN — TAMSULOSIN HYDROCHLORIDE 0.4 MG: 0.4 CAPSULE ORAL at 10:12

## 2022-04-12 RX ADMIN — ISOSORBIDE MONONITRATE 30 MG: 30 TABLET ORAL at 10:14

## 2022-04-12 RX ADMIN — ACETAMINOPHEN 650 MG: 325 TABLET, FILM COATED ORAL at 11:53

## 2022-04-12 RX ADMIN — ESCITALOPRAM 5 MG: 5 TABLET, FILM COATED ORAL at 10:13

## 2022-04-12 RX ADMIN — INSULIN LISPRO 6 UNITS: 100 INJECTION, SOLUTION INTRAVENOUS; SUBCUTANEOUS at 11:54

## 2022-04-12 RX ADMIN — ATORVASTATIN CALCIUM 20 MG: 20 TABLET, FILM COATED ORAL at 09:34

## 2022-04-12 RX ADMIN — INSULIN LISPRO 7 UNITS: 100 INJECTION, SOLUTION INTRAVENOUS; SUBCUTANEOUS at 17:15

## 2022-04-12 RX ADMIN — ENTACAPONE 200 MG: 200 TABLET, FILM COATED ORAL at 20:31

## 2022-04-12 RX ADMIN — FLUTICASONE PROPIONATE 2 SPRAY: 50 SPRAY, METERED NASAL at 11:54

## 2022-04-12 NOTE — CONSULTS
Purpose of the visit was to evaluate for: goals of care/advanced care planning and support for patient/family. Spoke with RN as well as patient and family and discussed palliative care, goals of care, care options and resuscitation status.      Assessment:  Patient with parkinson's disease, multiple falls and weakness, living at home. Patient alert and pleasantly confused, no acute distress noted. PPS 40%    Recommendations/Plan: No changes to treatment plan. Palliative care team will sign off.     Other Comments: Spoke with patient's wife at bedside. She has been his caregiver at home and has good understanding of his condition. Her goal is for short term rehab for strength building and for patient to return home. She plans to take care of him as long as she can without placement in a nursing home.    We did discuss treatment options, goals of care and code status. Wife states patient does have a living will and will try to bring us a copy. She states patient does want to be resuscitated if his heart stops and full treatment options but would not want to be prolonged on life support if there is no chance of recovery. We did briefly discuss palliative care and hospice services, wife not interested at this time but understands she may benefit from these services in the future. Answered all questions and provided support, advised of availability.

## 2022-04-12 NOTE — PROGRESS NOTES
Nutrition Services      Patient Name: Griffin Angeles  YOB: 1940  MRN: [id number]  Admission date: 4/11/2022    Comment:  Pt tolerating regular/thins, intake 50-75% thus far. Decreased appetite indicated upon admission. Pt has hx/o Parkinson's disease - no indication of chewing/swallowing impairment at this time. He is diabetic, Aic 7%. Will continue to monitor clinical course & intake.     CLINICAL NUTRITION ASSESSMENT      Reason for Assessment MST score, eating poorly d/t decreased appetite      H&P   81 y.o. male who presents to the ED c/o head trauma and low back pain after a fall today.  Patient has a history of Parkinson's disease.  He resides at home with his wife.  He requires a lot of assistance with his ADLs.  Wife was trying to get him cleaned up as he had missed his brief and she was trying to get him a shower when he fell and struck his head.  He sustained a superficial abrasion to his nose.    Past Medical History:   Diagnosis Date   • BPH (benign prostatic hypertrophy)    • CAD (coronary artery disease)    • Dementia (HCC)    • Hydrocephalus (HCC)    • Hyperlipidemia    • Hyperparathyroidism (HCC)    • Hypertension    • Hypothyroidism    • Lacunar infarction (HCC)     Bilateral basal ganglia   • Parkinsonism (HCC)    • Postherpetic neuralgia    • Shingles 06/2018   • Stroke (HCC)    • Type 2 diabetes mellitus (HCC)        Past Surgical History:   Procedure Laterality Date   • CATARACT EXTRACTION Bilateral 08/12/2017   • COLONOSCOPY  08/16/2017    Chronic inflammation.  Dr. Benoit.  [facility]   • CORONARY ANGIOPLASTY WITH STENT PLACEMENT     • HEMORRHOIDECTOMY     • PARATHYROIDECTOMY     • PENILE PROSTHESIS IMPLANT  2016    Dr. Love at [facility]   • RETINAL LASER PROCEDURE Right    • TURP / TRANSURETHRAL INCISION / DRAINAGE PROSTATE          Current Problems   -decreased functional status r/t Parkinson's  -PT/OT ordered for possible SNF placement       Encounter Information  "       Nutrition/Diet History:    Not on modified diet; tolerates reg/thins. No swallow issues noted   Typical Intake: Has eaten 50-75% here   Food Preferences:    Meal/Snack Patterns:    Supplements:    Typical Activity Pattern: Decreased mobility noted, multiple recent falls, \"chairfast\"   Factors Affecting Intake:    Tests/Procedures: Multiple CTs     Anthropometrics        Current Height  Current Weight Height: 167.6 cm (66\")  Weight: 59 kg (130 lb) (04/11/22 1249)       Admission Weight 130 lb   Ideal Body Weight (IBW) 142 lb   Usual Body Weight (UBW) ~130 lb-135 lb for >5 years       Trending Weight Hx     Weight Change              PTA:     Wt Readings from Last 30 Encounters:   04/11/22 1249 59 kg (130 lb)   03/21/22 1134 59 kg (130 lb)   11/04/21 1343 59.4 kg (131 lb)   11/04/21 1418 63 kg (139 lb)   08/05/21 1346 59.6 kg (131 lb 6.4 oz)   04/07/21 1337 61.6 kg (135 lb 12.8 oz)   11/02/20 1456 62.1 kg (136 lb 12.8 oz)   10/26/20 1236 59 kg (130 lb)   07/02/20 1330 59.1 kg (130 lb 3.2 oz)   02/28/20 1720 54.4 kg (120 lb)   02/14/20 1548 54.4 kg (120 lb)   12/10/19 1317 59.4 kg (131 lb)   10/10/19 0923 59.4 kg (131 lb)   08/09/19 1203 57.2 kg (126 lb)   05/06/19 1412 60.3 kg (133 lb)   03/14/19 1141 60.5 kg (133 lb 6.4 oz)   01/16/19 1541 61.2 kg (135 lb)   11/07/18 1301 61.9 kg (136 lb 6.4 oz)   09/25/18 1044 61.2 kg (135 lb)   09/18/18 1024 60.3 kg (133 lb)   09/06/18 1032 60.3 kg (133 lb)   06/15/18 1537 63.1 kg (139 lb 3.2 oz)   01/31/18 1100 66 kg (145 lb 9.6 oz)   09/21/17 1120 59.6 kg (131 lb 6.4 oz)   09/07/17 1005 62.1 kg (137 lb)   09/07/17 1031 60.3 kg (133 lb)   05/15/17 1203 62 kg (136 lb 9.6 oz)   03/05/17 2155 63.5 kg (140 lb)   11/14/16 1134 61.8 kg (136 lb 4 oz)   09/12/16 1428 59.9 kg (132 lb)      BMI kg/m2 Body mass index is 20.98 kg/m².       Labs       Pertinent Labs    Results from last 7 days   Lab Units 04/12/22  0809 04/11/22  1247   SODIUM mmol/L 137 138   POTASSIUM mmol/L 4.6 4.4 "   CHLORIDE mmol/L 104 103   CO2 mmol/L 23.7 28.0   BUN mg/dL 21 28*   CREATININE mg/dL 1.17 1.13   CALCIUM mg/dL 9.2 9.0   BILIRUBIN mg/dL  --  0.4   ALK PHOS U/L  --  279*   ALT (SGPT) U/L  --  8   AST (SGOT) U/L  --  9   GLUCOSE mg/dL 158* 221*     Results from last 7 days   Lab Units 04/12/22  0809   HEMOGLOBIN g/dL 12.5*   HEMATOCRIT % 38.4     COVID19   Date Value Ref Range Status   04/11/2022 Not Detected Not Detected - Ref. Range Final     Lab Results   Component Value Date    HGBA1C 7.00 (H) 04/12/2022        Medications   Scheduled Medications atorvastatin, 20 mg, Oral, Daily  carbidopa-levodopa, 2 tablet, Oral, TID With Meals  cholecalciferol, 1,000 Units, Oral, Daily  donepezil, 10 mg, Oral, Nightly  escitalopram, 5 mg, Oral, Daily  fluticasone, 2 spray, Nasal, Daily  [START ON 4/13/2022] insulin glargine, 8 Units, Subcutaneous, QAM  insulin lispro, 0-9 Units, Subcutaneous, TID With Meals  isosorbide mononitrate, 30 mg, Oral, Daily  levothyroxine, 88 mcg, Oral, Daily  losartan, 50 mg, Oral, Daily  metoprolol succinate XL, 25 mg, Oral, Daily  tamsulosin, 0.4 mg, Oral, Daily       Infusions     PRN Medications •  acetaminophen **OR** acetaminophen **OR** acetaminophen  •  dextrose  •  dextrose  •  glucagon (human recombinant)  •  ondansetron  •  [COMPLETED] Insert peripheral IV **AND** sodium chloride     Physical Findings        Physical Appearance, NFPE Bruising (facial)   --  Edema     Gastrointestinal Incontinent, BM 4/12   Tubes/Drains    Oral/Mouth Cavity Missing teeth   Skin Armando 15, nose abrasion noted   --  Current Nutrition Orders & Evaluation of Intake       Oral Nutrition     Food Allergies NKFA   Current PO Diet Diet Regular; Cardiac, Consistent Carbohydrate   Supplement    PO Evaluation     Trending % PO Intake 50-75% x 2 meals   --  Nutritional Risk Screening       MST SCORE   1       Nutrition Diagnosis        Nutrition Dx Problem 1 Nutrition appropriate for condition at this time      Nutrition Dx Problem 2        Intervention Goal        Intervention Goal(s) Tolerate PO diet  PO intake >75%  Maintain weight     Nutrition Intervention        RD Action Follow Tx progress  Interview for preferences (FNS)  Encourage intake  Set up assistance as needed     Nutrition Prescription        Diet Prescription    Supplement Prescription    --  Monitor/Evaluation        Monitor Per protocol, I&O, PO intake, Pertinent labs, Weight, Skin status, GI status, POC/GOC, Swallow function     RD to follow per protocol.      Electronically signed by:  Dayna Mcdonald RD  04/12/22 14:22 EDT

## 2022-04-12 NOTE — DISCHARGE PLACEMENT REQUEST
"Dunia Tairq (81 y.o. Male)             Date of Birth   1940    Social Security Number       Address   [address] [address]    Home Phone   [phone]    MRN   [id number]       [employer]   Unknown    Marital Status                               Admission Date   4/11/22    Admission Type   Emergency    Admitting Provider   Emely Mckinley MD    Attending Provider   Alvin Marley DO    Department, Room/Bed   [address], P485/1       Discharge Date       Discharge Disposition       Discharge Destination                               Attending Provider: Alvin Marley DO    Allergies: No Known Allergies    Isolation: None   Infection: None   Code Status: CPR   Advance Care Planning Activity    Ht: 167.6 cm (66\")   Wt: 59 kg (130 lb)    Admission Cmt: None   Principal Problem: Fall at home [W19.XXXA,Y92.009]                 Active Insurance as of 4/11/2022     Primary Coverage     Payor Plan Insurance Group Employer/Plan Group    MEDICARE MEDICARE A & B      Payor Plan Address Payor Plan Phone Number Payor Plan Fax Number Effective Dates    [address] [phone]  12/1/2006 - None Entered    [facility] [address]       Subscriber Name Subscriber Birth Date Member ID       DUNIA TARIQ 1940 [id number]           Secondary Coverage     Payor Plan Insurance Group Employer/Plan Group    [facility] INDEMNITY [facility] INDEMNITY PLAN F     Payor Plan Address Payor Plan Phone Number Payor Plan Fax Number Effective Dates    [address] [phone]  1/1/2016 - None Entered    [facility] [id number]       Subscriber Name Subscriber Birth Date Member ID       DUNIA TARIQ 1940 [id number]                 Emergency Contacts      (Rel.) Home Phone Work Phone Mobile Phone    Zohra Tariq (Spouse) [phone] -- --              "

## 2022-04-12 NOTE — ED NOTES
Nursing report ED to floor  Griffin Angeles  81 y.o.  male    HPI :   Chief Complaint   Patient presents with   • Fall   • Head Injury       Admitting doctor:   Emely Mckinley MD    Admitting diagnosis:   The primary encounter diagnosis was Parkinson's disease (HCC). Diagnoses of Frequent falls, Abrasion of nose, initial encounter, and Minor head injury, initial encounter were also pertinent to this visit.    Code status:   Current Code Status     Date Active Code Status Order ID Comments User Context       4/11/2022 1617 CPR (Attempt to Resuscitate) [id number]  Emely Mckinley MD ED     Advance Care Planning Activity      Questions for Current Code Status     Question Answer    Code Status (Patient has no pulse and is not breathing) CPR (Attempt to Resuscitate)    Medical Interventions (Patient has pulse or is breathing) Full Support          Allergies:   Patient has no known allergies.    Intake and Output  No intake or output data in the 24 hours ending 04/11/22 2325    Weight:       04/11/22  1249   Weight: 59 kg (130 lb)       Most recent vitals:   Vitals:    04/11/22 1901 04/11/22 2032 04/11/22 2131 04/11/22 2324   BP: 180/81 177/86 172/74 170/77   BP Location:  Right arm  Right arm   Patient Position:  Sitting  Sitting   Pulse:  69 70 70   Resp:  16 16 16   Temp:       TempSrc:       SpO2:  97% 96% 96%   Weight:       Height:           Active LDAs/IV Access:   Lines, Drains & Airways     Active LDAs     Name Placement date Placement time Site Days    Peripheral IV 04/11/22 Left Forearm 04/11/22  --  Forearm  less than 1                Labs (abnormal labs have a star):   Labs Reviewed   COMPREHENSIVE METABOLIC PANEL - Abnormal; Notable for the following components:       Result Value    Glucose 221 (*)     BUN 28 (*)     Alkaline Phosphatase 279 (*)     All other components within normal limits    Narrative:     GFR Normal >60  Chronic Kidney Disease <60  Kidney Failure <15     CBC WITH AUTO  DIFFERENTIAL - Abnormal; Notable for the following components:    Hemoglobin 12.7 (*)     Hematocrit 36.9 (*)     MPV 13.2 (*)     Neutrophil % 82.8 (*)     Lymphocyte % 9.5 (*)     Neutrophils, Absolute 8.71 (*)     All other components within normal limits   POCT GLUCOSE FINGERSTICK - Abnormal; Notable for the following components:    Glucose 181 (*)     All other components within normal limits   POCT GLUCOSE FINGERSTICK - Abnormal; Notable for the following components:    Glucose 172 (*)     All other components within normal limits   COVID-19CYNDY IN-HOUSE CEPHEID/FAUSTINO, NP SWAB IN TRANSPORT MEDIA 8-12 HR TAT - Normal    Narrative:     Fact sheet for providers: https://www.fda.gov/media/320895/download     Fact sheet for patients: https://www.fda.gov/media/815490/download   PROTIME-INR - Normal   APTT - Normal   COVID PRE-OP / PRE-PROCEDURE SCREENING ORDER (NO ISOLATION)    Narrative:     The following orders were created for panel order COVID PRE-OP / PRE-PROCEDURE SCREENING ORDER (NO ISOLATION) - Swab, Nasopharynx.  Procedure                               Abnormality         Status                     ---------                               -----------         ------                     COVID-19, BRENDA IN-HOUSE...[[id number]]  Normal              Final result                 Please view results for these tests on the individual orders.   BASIC METABOLIC PANEL   CBC WITH AUTO DIFFERENTIAL   HEMOGLOBIN A1C   POCT GLUCOSE FINGERSTICK   POCT GLUCOSE FINGERSTICK   POCT GLUCOSE FINGERSTICK   CBC AND DIFFERENTIAL    Narrative:     The following orders were created for panel order CBC & Differential.  Procedure                               Abnormality         Status                     ---------                               -----------         ------                     CBC Auto Differential[[id number]]        Abnormal            Final result                 Please view results for these tests on the individual  orders.       EKG:   No orders to display       Meds given in ED:   Medications   sodium chloride 0.9 % flush 10 mL (has no administration in time range)   acetaminophen (TYLENOL) tablet 650 mg (has no administration in time range)     Or   acetaminophen (TYLENOL) 160 MG/5ML solution 650 mg (has no administration in time range)     Or   acetaminophen (TYLENOL) suppository 650 mg (has no administration in time range)   ondansetron (ZOFRAN) injection 4 mg (has no administration in time range)   dextrose (GLUTOSE) oral gel 15 g (has no administration in time range)   dextrose (D50W) (25 g/50 mL) IV injection 25 g (has no administration in time range)   glucagon (human recombinant) (GLUCAGEN DIAGNOSTIC) injection 1 mg (has no administration in time range)   insulin lispro (ADMELOG) injection 0-9 Units (2 Units Subcutaneous Given 4/11/22 2032)       Imaging results:  CT Head Without Contrast    Result Date: 4/11/2022   No evidence for acute traumatic intracranial pathology.  The ventricles are system is relatively prominent in size as compared to the size of the sulci near the vertex raising concern for normal pressure hydrocephalus in the appropriate clinical setting and correlation with clinical data is suggested. When compared to the previous examination dated 02/28/2020, the ventricular system is stable to perhaps minimally larger in size.  Incidental note is made of mild to moderate changes of chronic small vessel ischemic phenomena and old lacunar disease.   TECHNIQUE: CT scan of the cervical spine was obtained with 1 mm axial bone algorithm and 2 mm axial soft tissue algorithm images. Sagittal and coronal reconstructed images were obtained.  FINDINGS:  There is no evidence for acute fracture or bony malalignment within the cervical spine.  Disc osteophyte complexes at C5-6 and C6-7 result in relatively mild degrees of canal narrowing. Uncovertebral joint hypertrophy at C5-6 and C6-7 result in relatively mild  degrees of foraminal narrowing. Mild-to-moderate bilateral foraminal narrowing is seen at C3-4 secondary to a combination of uncovertebral joint hypertrophy and facet arthrosis and a disc osteophyte complex at C3-4 results in a mild degree of canal narrowing.  Incidental note is made of a stable 6 mm nodular focus within the right lung apex that is unchanged in appearance when compared to the prior cervical spine CT study dated 03/05/2017. The stability favors a benign etiology.  IMPRESSION:  No evidence for acute fracture or bony malalignment involving the cervical spine.  Multilevel degenerative phenomena within the cervical spine are incidentally noted and discussed in detail above.   Radiation dose reduction techniques were utilized, including automated exposure control and exposure modulation based on body size.  This report was finalized on 4/11/2022 12:22 PM by Dr. Gerardo Ledesma M.D.      CT Cervical Spine Without Contrast    Result Date: 4/11/2022   No evidence for acute traumatic intracranial pathology.  The ventricles are system is relatively prominent in size as compared to the size of the sulci near the vertex raising concern for normal pressure hydrocephalus in the appropriate clinical setting and correlation with clinical data is suggested. When compared to the previous examination dated 02/28/2020, the ventricular system is stable to perhaps minimally larger in size.  Incidental note is made of mild to moderate changes of chronic small vessel ischemic phenomena and old lacunar disease.   TECHNIQUE: CT scan of the cervical spine was obtained with 1 mm axial bone algorithm and 2 mm axial soft tissue algorithm images. Sagittal and coronal reconstructed images were obtained.  FINDINGS:  There is no evidence for acute fracture or bony malalignment within the cervical spine.  Disc osteophyte complexes at C5-6 and C6-7 result in relatively mild degrees of canal narrowing. Uncovertebral joint hypertrophy at  C5-6 and C6-7 result in relatively mild degrees of foraminal narrowing. Mild-to-moderate bilateral foraminal narrowing is seen at C3-4 secondary to a combination of uncovertebral joint hypertrophy and facet arthrosis and a disc osteophyte complex at C3-4 results in a mild degree of canal narrowing.  Incidental note is made of a stable 6 mm nodular focus within the right lung apex that is unchanged in appearance when compared to the prior cervical spine CT study dated 2017. The stability favors a benign etiology.  IMPRESSION:  No evidence for acute fracture or bony malalignment involving the cervical spine.  Multilevel degenerative phenomena within the cervical spine are incidentally noted and discussed in detail above.   Radiation dose reduction techniques were utilized, including automated exposure control and exposure modulation based on body size.  This report was finalized on 2022 12:22 PM by Dr. Gerardo Ledesma M.D.      XR Spine Lumbar Complete 4+VW    Result Date: 2022  1.  Levoscoliotic curvature of the lumbar spine with degenerative disc disease and facet arthritis. No evidence for fracture or acute abnormality of the lumbar spine. 2.  Left renal stones.  This report was finalized on 2022 1:48 PM by Dr. Tiago French M.D.        Ambulatory status:   - standby x2 assist     Social issues:   Social History     Socioeconomic History   • Marital status:    Tobacco Use   • Smoking status: Former Smoker     Types: Pipe     Quit date:      Years since quittin.2   • Smokeless tobacco: Never Used   • Tobacco comment: SMOKED PIPE    Vaping Use   • Vaping Use: Never used   Substance and Sexual Activity   • Alcohol use: Not Currently     Comment: d/c   • Drug use: No   • Sexual activity: Defer       NIH Stroke Scale:        Nursing report ED to floor:

## 2022-04-12 NOTE — CONSULTS
DOS: 2022  NAME: Griffin Angeles   : 1940  PCP: Jeronimo Quevedo MD  CC: Stroke  Referring MD: Emely Mckinley, *Cristopher Marley DO, Kervin Argueta MD    Neurological Problem and Interval History:  81 y.o. right-handed right male with a Hx of Parkinson's disease diagnosed approximately 3 to 4 years ago and followed by Dr Kervin Argueta in [place] was admitted to the hospital last  after he took a nasty fall in his bathroom while his wife was trying to clean him up from an episode of diarrhea..  She could not pick him up as he was dead weight and was brought to the emergency room.  As per history he has been diagnosed with Parkinson's disease approximately 3 years ago and has been slowly noticed to be progressively getting worse over the last 4 to 5 years.  He does not have any tremors but he also has normal pressure hydrocephalus as noted in the CT scan and an MRI which had been done in the past had shown an evidence of a cyst.  He was not considered a case for ventriculoperitoneal shunt placement for normal pressure hydrocephalus 5 years ago.  He was being treated by his neurologist for the Parkinson's disease and he was initially on the carbidopa levodopa 25 mg x 100 mg 1.5 tablets 3 times daily and this has been increased to 2 tablets 3 times daily.  The patient's wife is also noted that if he does not get his medications at the appropriate time, he tends to get stiffer and has difficulty getting out of bed or even falling.  Lately he has been falling almost on a daily basis and sometimes once a week.  For that reason it was discussed about bringing this patient to the inpatient hospitalist.  The patient's voice is clear and he denies having any dysphagia and difficulty with handwriting has not been noted.  However he has difficulty maintaining his balance when sitting up and he has a classical stooped posture and tends to stand up with a walker with assistance and a gait belt and very  cautious about taking steps.  At home he usually uses a Rollator and he feels comfortable with that but here he was unable to take a few steps even with a walker.  He is also being incontinent of his bowels and bladder for quite some time and he has progressive dementia but no hallucinations or delusions.  He has a strong family history of Parkinson's disease with a first cousin as well as his daughter.  Bruises are noticeable on his face as he fell flat on his face on the bathroom tiles last Sunday.    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  Dr. Benoit.  [facility]   • CORONARY ANGIOPLASTY WITH STENT PLACEMENT     • HEMORRHOIDECTOMY     • PARATHYROIDECTOMY     • PENILE PROSTHESIS IMPLANT  2016    Dr[facility]   • RETINAL LASER PROCEDURE Right    • TURP / TRANSURETHRAL INCISION / DRAINAGE PROSTATE         Review of Systems:    Constitutional: Very nice gentleman currently resting in bed but he has lost a lot of weight.  Cardiovascular: Denies any chest pain or palpitations.  Respiratory: No shortness of breath noted.  Gastrointestinal: Currently no nausea and vomiting noted but the patient has very poor appetite and eats very little at a time.  Genitourinary: Is incontinent of his bladder.  Musculoskeletal: Has increased leadpipe rigidity of both his upper and lower extremities.  Dermatological: Has bruises on his face and upper  part of the torso from a fall in his bathroom.  Neurological: Stigmata of Parkinson's disease.  Psychiatric: No underlying major anxiety or depression and no hallucinations or delusions noted.  Ophthalmological: No visual changes noted.          Medications On Admission  Medications Prior to Admission   Medication Sig Dispense Refill Last Dose   • atorvastatin (LIPITOR) 20 MG tablet TAKE 1 TABLET DAILY 90 tablet 1    • brompheniramine-pseudoephedrine-DM 30-2-10 MG/5ML syrup Take 5 mL by mouth.      • carbidopa-levodopa (SINEMET)  MG per tablet 2 tablets 3 (Three) Times a Day. 2 tablets in the morning 1 tablet at lunch and 1  Hs    Now taking 2 TID per wife      • Cholecalciferol (VITAMIN D3) 1000 units capsule Take 1 capsule by mouth Daily.      • clopidogrel (PLAVIX) 75 MG tablet TAKE 1 TABLET DAILY 90 tablet 1    • Continuous Blood Gluc Sensor (FreeStyle Dione 2 Sensor) misc 1 each Every 14 (Fourteen) Days. Use every 14 days.  Dx: E 1 2 each 6    • donepezil (ARICEPT) 10 MG tablet TAKE 1 TABLET BY MOUTH ONCE DAILY AT NIGHT 90 tablet 1    • escitalopram (LEXAPRO) 5 MG tablet Take 10 mg by mouth Daily.      • fluticasone (FLONASE) 50 MCG/ACT nasal spray 2 sprays into the nostril(s) as directed by provider Daily As Needed.      • insulin degludec (Tresiba FlexTouch) 100 UNIT/ML solution pen-injector injection INJECT 8 UNITS SUBCUTANEOUSLY IN THE MORNING AS DIRECTED INTO  APPROPRIATE  AREA 15 mL 1    • isosorbide mononitrate (IMDUR) 30 MG 24 hr tablet TAKE 1 TABLET DAILY 90 tablet 1    • levothyroxine (SYNTHROID, LEVOTHROID) 88 MCG tablet TAKE 1 TABLET DAILY 90 tablet 1    • losartan (COZAAR) 50 MG tablet Take 50 mg by mouth Daily.      • metoprolol succinate XL (TOPROL-XL) 25 MG 24 hr tablet 25 mg Daily. 1/2 tablet daily  3    • tamsulosin (FLOMAX) 0.4 MG capsule 24 hr capsule 2 capsules Daily.      • ACCU-CHEK SOFTCLIX LANCETS lancets Dx code E10.8 testing bs 3 x day 300 each 1    • BD Pen Needle Aydee 2nd Gen  "32G X 4 MM misc USE AS DIRECTED 4 TIMES A   each 2    • FREESTYLE PRECISION JEREMIAS TEST test strip Dx code E10.43 Testing bs 4 x day 400 each 1    • glucagon (GLUCAGON EMERGENCY) 1 MG injection Inject 1 mg under the skin into the appropriate area as directed 1 (One) Time As Needed for Low Blood Sugar. 1 kit 5    • Insulin Syringe-Needle U-100 (RELION INSULIN SYRINGE) 31G X 15/64\" 0.5 ML misc Using 3 syringes daily 300 each 1    • NovoLOG FlexPen 100 UNIT/ML solution pen-injector sc pen INJECT 5-6 UNITS SUBCUTANEOUSLY AT BREAKFAST, 5-6 UNITS AT LUNCH AND 5-6 UNITS AT DINNER 30 mL 1        Allergies:  No Known Allergies    Social Hx:  Social History     Socioeconomic History   • Marital status:      Spouse name: Zohra   • Number of children: 2   • Years of education: 14   • Highest education level: High school graduate   Tobacco Use   • Smoking status: Former Smoker     Types: Pipe     Quit date:      Years since quittin.3   • Smokeless tobacco: Never Used   • Tobacco comment: SMOKED PIPE occasionally   Vaping Use   • Vaping Use: Never used   Substance and Sexual Activity   • Alcohol use: Not Currently     Comment: d/c   • Drug use: No   • Sexual activity: Defer       Family Hx:  Family History   Problem Relation Age of Onset   • Diabetes Mother    • Thyroid disease Mother    • Stroke Mother    • Diabetes Father    • Kidney failure Father    • Heart disease Sister        Review of Imaging (Interpretation of images not reports): Reviewed the CT scan of the brain and details performed in the emergency room on 2022 that shows the following:        Additional Tests Performed: MRI of the brain performed without contrast on 2017 at the [facility] was reviewed that shows the following:    FINDINGS:     There is diffuse ventricular enlargement, exceeding what would be expected from the degree of atrophy. There is a probable cyst in the midline posterior fossa, adjacent to " foramen of Magendie, which appears widely patent. 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 The pattern of disproportionate ventriculomegaly likely represents chronic compensated hydrocephalus.     Scattered T2 hyperintense lesions in the white matter likely, most pronounced in the periventricular and pontine regions, represent severe chronic small vessel ischemic changes. There are small old lacunar infarctions in the basal ganglia bilaterally.     There is no restricted diffusion to suggest an acute infarction. The major arterial and dural venous flow-voids are preserved. The patient has undergone lens surgery bilaterally.     IMPRESSION:     1. No evidence of an acute infarction.     2. Chronic-appearing hydrocephalus, possibly due to an arachnoid cyst or Britton's pouch cyst impairing fourth ventricular outflow.     3. Severe chronic small vessel ischemic changes in the white matter, and multiple old lacunar infarctions in the basal ganglia.         Laboratory Results:   Lab Results   Component Value Date    GLUCOSE 158 (H) 04/12/2022    CALCIUM 9.2 04/12/2022     04/12/2022    K 4.6 04/12/2022    CO2 23.7 04/12/2022     04/12/2022    BUN 21 04/12/2022    CREATININE 1.17 04/12/2022    EGFRIFAFRI 75 07/29/2021    EGFRIFNONA 65 07/29/2021    BCR 17.9 04/12/2022    ANIONGAP 9.3 04/12/2022     Lab Results   Component Value Date    WBC 10.13 04/12/2022    HGB 12.5 (L) 04/12/2022    HCT 38.4 04/12/2022    MCV 89.1 04/12/2022     04/12/2022     No results found for: CHOL  Lab Results   Component Value Date    HDL 66 03/11/2022    HDL 54 07/29/2021    HDL 63 (H) 03/24/2021     Lab Results   Component Value Date    LDL 50 03/11/2022    LDL 46 07/29/2021    LDL 66 03/24/2021     Lab Results   Component Value Date    TRIG 148 03/11/2022    TRIG 85 07/29/2021    TRIG 128 03/24/2021     Lab Results   Component Value Date    HGBA1C 7.00 (H) 04/12/2022     Lab Results   Component Value Date  "   INR 0.99 04/11/2022    PROTIME 13.0 04/11/2022         Physical Examination:  BP 99/54 (BP Location: Left arm, Patient Position: Lying)   Pulse 76   Temp 97.4 °F (36.3 °C) (Oral)   Resp 16   Ht 167.6 cm (66\")   Wt 59 kg (130 lb)   SpO2 95%   BMI 20.98 kg/m²   General Appearance:   Very emaciated looking gentleman who has lost a lot of weight and stooped in appearance.  HEENT: Normocephalic.  Normal fundoscopic exam including normal retina, discs are flat with sharp margins, normal vasculature.  Neck and Spine: Normal range of motion.  Normal alignment. No mass or tenderness. No bruits.  Cardiac: Regular rate and rhythm. No murmurs.  Peripheral Vasculature: Radial and pedal pulses are equal and symmetric. No signs of distal embolization.  Extremities:    No edema or deformities. Normal joint ROM. VALENCIA hoses and SCD's in place  Skin:    Has bruises on his face and upper torso from the falls.    Neurological examination:  Higher Integrative  Function: Oriented to time, place and person. Normal registration, recall, attention span and concentration. Normal language including comprehension, spontaneous speech, repetition, reading, writing, naming and vocabulary. No neglect with normal visual-spatial function and construction. Normal fund of knowledge and higher integrative function.  CN II: Pupils are equal, round, and reactive to light. Normal visual acuity and visual fields.    CN III IV VI: Extraocular movements are full without nystagmus.   CN V: Normal facial sensation and strength of muscles of mastication.  CN VII: Facial movements are symmetric.  Has masked facies.  CN VIII:   Auditory acuity is normal.  CN IX & X:   Symmetric palatal movement.  CN XI: Sternocleidomastoid and trapezius are normal.  No weakness.  CN XII:   The tongue is midline.  No atrophy or fasciculations.  Motor: Has bilateral leadpipe rigidity worse in the lower extremities compared to the upper.  There is a lot of loss of muscle " mass because of weight loss..  Reflexes: Unable to elicit any deep tendon reflexes in both upper and lower extremities. Plantar responses are flexor.  Sensation: Normal to light touch, pinprick, vibration, temperature, and proprioception in arms and legs.   Station and Gait: He needed help getting out of the bed as well as sitting and then with a walker and gait belt and the nursing aide trying to help him he barely could stand up in a stooped posture and then he took some prominent festinating steps and he felt unstable to get up and walk further and wanted to get back in bed..    Coordination: Finger to nose test shows no dysmetria.    Because of progressive stiffness of lower extremities hard to perform the lower extremity heel-to-shin testing.      Diagnoses / Discussion:  81 y.o. who presents with Sx of progressive Parkinson's disease stage IIIb.  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  In addition he also has hydrocephalus as noted on the MRI.    Plan:  MRI of the brain with and without contrast to look for hydrocephalus as well as bilateral basal ganglia infarcts have been requested.    Added Comtan/entacapone 200 mg 3 times daily to the current dose of the carbidopa/levodopa 50/200 mg 3 times daily with food.  There is a combined medication called Stalevo 15 x 200 x 200 3 times daily with food could be used as well.    Physical therapy and Occupational Therapy have already been requested and the patient should be also evaluated by inpatient rehabilitation department to see if he qualifies for in-house inpatient rehabilitation as well as getting the appropriate equipment for his wife so that she could take care of him at home..     I have discussed the above with the patient and family.  He will be followed up by our inpatient neurology team.  Time spent with patient: 60min    Coding    Dictated using Dragon  dictation.

## 2022-04-12 NOTE — PROGRESS NOTES
"    Name: Griffin Angeles ADMIT: 2022   : 1940  PCP: Jeronimo Quevedo MD    MRN: [id number] LOS: 0 days   AGE/SEX: 81 y.o. male  ROOM: [facility]/1     Subjective   Subjective   Resting in bed. Wife at bedside. States he had a fall last night and . States his balance is the issue.  She was concerned when he fell yesterday because of the facial trauma and blood- he is on Plavix for history of TIAs. Wife states they usually don't leave the house related to his condition. He has \"Parkinsonism\" per their neurologist and she does feel that the Sinemet helps. He did have large diarrhea x 1 yesterday and vomiting the night prior to that. Otherwise, denies any abdominal pain, nausea or vomiting at this time. Wife states fever of 100 x 1 here. No chest pain or dyspnea.      Objective   Objective   Vital Signs  Temp:  [97.4 °F (36.3 °C)-100 °F (37.8 °C)] 97.4 °F (36.3 °C)  Heart Rate:  [68-85] 76  Resp:  [16] 16  BP: ()/(54-89) 99/54  SpO2:  [95 %-99 %] 95 %  on   ;   Device (Oxygen Therapy): room air  Body mass index is 20.98 kg/m².     Physical Exam  Vitals and nursing note reviewed.   Constitutional:       Appearance: He is ill-appearing. He is not toxic-appearing.   HENT:      Head: Normocephalic and atraumatic.   Cardiovascular:      Rate and Rhythm: Normal rate and regular rhythm.      Pulses: Normal pulses.      Heart sounds: Normal heart sounds.   Pulmonary:      Effort: Pulmonary effort is normal. No respiratory distress.      Breath sounds: Normal breath sounds.   Abdominal:      General: Bowel sounds are normal. There is no distension.      Palpations: Abdomen is soft.      Tenderness: There is no abdominal tenderness.   Musculoskeletal:         General: No swelling or tenderness. Normal range of motion.      Cervical back: Normal range of motion and neck supple.   Skin:     General: Skin is warm and dry.      Coloration: Skin is pale.      Findings: Bruising present.   Neurological:      " General: No focal deficit present.      Mental Status: He is alert and oriented to person, place, and time.      Sensory: No sensory deficit.      Motor: Weakness present.      Coordination: Coordination normal.   Psychiatric:         Mood and Affect: Mood normal.         Behavior: Behavior normal.      Comments: Mildly flat       Results Review:       I reviewed the patient's new clinical results.  Results from last 7 days   Lab Units 04/12/22  0809 04/11/22  1247   WBC 10*3/mm3 10.13 10.52   HEMOGLOBIN g/dL 12.5* 12.7*   PLATELETS 10*3/mm3 181 159     Results from last 7 days   Lab Units 04/12/22  0809 04/11/22  1247   SODIUM mmol/L 137 138   POTASSIUM mmol/L 4.6 4.4   CHLORIDE mmol/L 104 103   CO2 mmol/L 23.7 28.0   BUN mg/dL 21 28*   CREATININE mg/dL 1.17 1.13   GLUCOSE mg/dL 158* 221*   Estimated Creatinine Clearance: 42 mL/min (by C-G formula based on SCr of 1.17 mg/dL).  Results from last 7 days   Lab Units 04/11/22  1247   ALBUMIN g/dL 3.70   BILIRUBIN mg/dL 0.4   ALK PHOS U/L 279*   AST (SGOT) U/L 9   ALT (SGPT) U/L 8     Results from last 7 days   Lab Units 04/12/22  0809 04/11/22  1247   CALCIUM mg/dL 9.2 9.0   ALBUMIN g/dL  --  3.70       Hemoglobin A1C   Date/Time Value Ref Range Status   04/12/2022 0809 7.00 (H) 4.80 - 5.60 % Final     Glucose   Date/Time Value Ref Range Status   04/12/2022 1052 289 (H) 70 - 130 mg/dL Final     Comment:     Meter: [id number] : [id number] Page Moya  CNA   04/12/2022 0750 142 (H) 70 - 130 mg/dL Final     Comment:     Meter: [id number] : [id number] Page Moya  CNA   04/11/2022 2219 172 (H) 70 - 130 mg/dL Final     Comment:     Meter: [id number] : [id number] Sal Pittman RN   04/11/2022 1958 181 (H) 70 - 130 mg/dL Final     Comment:     Meter: [id number] : [id number] Sal Pittman RN       atorvastatin, 20 mg, Oral, Daily  carbidopa-levodopa, 2 tablet, Oral, TID With Meals  cholecalciferol, 1,000 Units, Oral, Daily  donepezil, 10 mg, Oral,  Nightly  escitalopram, 5 mg, Oral, Daily  fluticasone, 2 spray, Nasal, Daily  [START ON 4/13/2022] insulin glargine, 8 Units, Subcutaneous, QAM  insulin lispro, 0-9 Units, Subcutaneous, TID With Meals  isosorbide mononitrate, 30 mg, Oral, Daily  levothyroxine, 88 mcg, Oral, Daily  losartan, 50 mg, Oral, Daily  metoprolol succinate XL, 25 mg, Oral, Daily  tamsulosin, 0.4 mg, Oral, Daily       Diet Regular; Cardiac, Consistent Carbohydrate       Assessment/Plan     Active Hospital Problems    Diagnosis  POA   • **Fall at home [W19.XXXA, Y92.009]  Not Applicable   • Nausea vomiting and diarrhea [R11.2, R19.7]  Yes   • Vascular dementia (HCC) [F01.50]  Yes   • History of CVA (cerebrovascular accident) [Z86.73]  Not Applicable   • Cerebral ventriculomegaly [G93.89]  Yes   • Parkinson's disease (HCC) [G20]  Yes   • 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     Mr. Angeles is a 81 year old male who presented to the hospital with a fall at home. He is dependent with ADLs secondary to a history of Parkinson's disease. He apparently suffered a fall face forward in the bathroom and is cared for at home by his wife. He is followed by a Neurologist at  for known vascular dementia and cerebral ventriculomegaly. He is not a candidate for  shunt placement.     · Fall at home: CT head and cervical spine as well as x-ray lumbar spine all negative for acute findings. PT/OT have been consulted.  · Vascular dementia/cerebral ventriculomegaly/Parkinsonism: Neurology consulted. Continue his home Sinemet and Aricept.   · History of CVA: Statin resumed. Plavix temporarily on hold. Monitor neuro checks.  · DM1: Followed by Dr. Silver. Long-acting insulin resumed. Will add back meal-time pending trends. Monitor ACHS and use SSI as needed.    · CAD: Followed by Dr. Kong. BB/ARB resumed.  · HTN: BP stable on BB/ARB.  · Nausea/vomiting/diarrhea: PTA. Possible gastroenteritis given recent community outbreaks. Will add PCR and monitor.     Discussed with patient, wife, palliative nurse and Dr. Marley.    VTE Prophylaxis - SCDs  Code Status - Full code- confirmed with patient and wife.  Disposition - Anticipate discharge to SNF most likely.      JOE Trevino  [facility]  04/12/22  16:07 EDT

## 2022-04-12 NOTE — PLAN OF CARE
Goal Outcome Evaluation:  Plan of Care Reviewed With: patient, spouse        Progress: no change  Outcome Evaluation: Wife brought pt to ER via EMS after he fell forward while on toilet & hit his nose & forehead, nasal & forehead abrasion c/d/i, CT negative, A&O but confused @ times, male purwick in place, BM x1, room air, SCDs, BS high in ER - TID accuchecks, arm insulin monitor does not work - wife awaiting replacement, pt mobility decreasing & is chairfast, Palliative consult ordered by MD, wife at bedside

## 2022-04-12 NOTE — PROGRESS NOTES
Continued Stay Note  [facility]     Patient Name: Griffin Angeles  MRN: [id number]  Today's Date: 4/12/2022    Admit Date: 4/11/2022     Discharge Plan     Row Name 04/12/22 1528       Plan    Plan Home with wife and HH vs SNF, referrals pending    Patient/Family in Agreement with Plan yes    Plan Comments CCP met with pt and wife at bedside per request. Wife prefers for pt to return home with HH if possible (pt has used [facility], but wife prefers [facility]/referral pending). Pt has no SNF history but wife agrees to referrals to facilities in [place], [place], and [facility] (pending). PT/OT evals pending. Palliative consult pending. CCP to follow. Leah Thompson LCSW               Discharge Codes    No documentation.               Expected Discharge Date and Time     Expected Discharge Date Expected Discharge Time    Apr 15, 2022             Paola Thompson LCSW

## 2022-04-13 ENCOUNTER — APPOINTMENT (OUTPATIENT)
Dept: MRI IMAGING | Facility: HOSPITAL | Age: 82
End: 2022-04-13

## 2022-04-13 LAB
ALBUMIN SERPL-MCNC: 3 G/DL (ref 3.5–5.2)
ALBUMIN/GLOB SERPL: 1 G/DL
ALP SERPL-CCNC: 220 U/L (ref 39–117)
ALT SERPL W P-5'-P-CCNC: 7 U/L (ref 1–41)
ANION GAP SERPL CALCULATED.3IONS-SCNC: 10.2 MMOL/L (ref 5–15)
AST SERPL-CCNC: 7 U/L (ref 1–40)
BILIRUB SERPL-MCNC: 0.6 MG/DL (ref 0–1.2)
BUN SERPL-MCNC: 27 MG/DL (ref 8–23)
BUN/CREAT SERPL: 26.7 (ref 7–25)
CALCIUM SPEC-SCNC: 9 MG/DL (ref 8.6–10.5)
CHLORIDE SERPL-SCNC: 102 MMOL/L (ref 98–107)
CO2 SERPL-SCNC: 23.8 MMOL/L (ref 22–29)
CREAT SERPL-MCNC: 1.01 MG/DL (ref 0.76–1.27)
DEPRECATED RDW RBC AUTO: 43.9 FL (ref 37–54)
EGFRCR SERPLBLD CKD-EPI 2021: 75.2 ML/MIN/1.73
ERYTHROCYTE [DISTWIDTH] IN BLOOD BY AUTOMATED COUNT: 13.6 % (ref 12.3–15.4)
GLOBULIN UR ELPH-MCNC: 2.9 GM/DL
GLUCOSE BLDC GLUCOMTR-MCNC: 197 MG/DL (ref 70–130)
GLUCOSE BLDC GLUCOMTR-MCNC: 267 MG/DL (ref 70–130)
GLUCOSE BLDC GLUCOMTR-MCNC: 288 MG/DL (ref 70–130)
GLUCOSE BLDC GLUCOMTR-MCNC: 355 MG/DL (ref 70–130)
GLUCOSE SERPL-MCNC: 238 MG/DL (ref 65–99)
HCT VFR BLD AUTO: 37.8 % (ref 37.5–51)
HGB BLD-MCNC: 12.2 G/DL (ref 13–17.7)
MCH RBC QN AUTO: 28.8 PG (ref 26.6–33)
MCHC RBC AUTO-ENTMCNC: 32.3 G/DL (ref 31.5–35.7)
MCV RBC AUTO: 89.4 FL (ref 79–97)
PLATELET # BLD AUTO: 181 10*3/MM3 (ref 140–450)
PMV BLD AUTO: 13.3 FL (ref 6–12)
POTASSIUM SERPL-SCNC: 4.5 MMOL/L (ref 3.5–5.2)
PROT SERPL-MCNC: 5.9 G/DL (ref 6–8.5)
RBC # BLD AUTO: 4.23 10*6/MM3 (ref 4.14–5.8)
SODIUM SERPL-SCNC: 136 MMOL/L (ref 136–145)
TSH SERPL DL<=0.05 MIU/L-ACNC: 0.26 UIU/ML (ref 0.27–4.2)
WBC NRBC COR # BLD: 9.32 10*3/MM3 (ref 3.4–10.8)

## 2022-04-13 PROCEDURE — 97166 OT EVAL MOD COMPLEX 45 MIN: CPT

## 2022-04-13 PROCEDURE — 97162 PT EVAL MOD COMPLEX 30 MIN: CPT

## 2022-04-13 PROCEDURE — 70553 MRI BRAIN STEM W/O & W/DYE: CPT

## 2022-04-13 PROCEDURE — 85027 COMPLETE CBC AUTOMATED: CPT | Performed by: NURSE PRACTITIONER

## 2022-04-13 PROCEDURE — 63710000001 INSULIN LISPRO (HUMAN) PER 5 UNITS: Performed by: INTERNAL MEDICINE

## 2022-04-13 PROCEDURE — A9577 INJ MULTIHANCE: HCPCS | Performed by: INTERNAL MEDICINE

## 2022-04-13 PROCEDURE — 82962 GLUCOSE BLOOD TEST: CPT

## 2022-04-13 PROCEDURE — 97535 SELF CARE MNGMENT TRAINING: CPT

## 2022-04-13 PROCEDURE — 99233 SBSQ HOSP IP/OBS HIGH 50: CPT | Performed by: NURSE PRACTITIONER

## 2022-04-13 PROCEDURE — 84443 ASSAY THYROID STIM HORMONE: CPT | Performed by: NURSE PRACTITIONER

## 2022-04-13 PROCEDURE — 0 GADOBENATE DIMEGLUMINE 529 MG/ML SOLUTION: Performed by: INTERNAL MEDICINE

## 2022-04-13 PROCEDURE — 80053 COMPREHEN METABOLIC PANEL: CPT | Performed by: NURSE PRACTITIONER

## 2022-04-13 PROCEDURE — 97530 THERAPEUTIC ACTIVITIES: CPT

## 2022-04-13 RX ORDER — SODIUM CHLORIDE 0.9 % (FLUSH) 0.9 %
10 SYRINGE (ML) INJECTION EVERY 12 HOURS SCHEDULED
Status: DISCONTINUED | OUTPATIENT
Start: 2022-04-13 | End: 2022-04-15 | Stop reason: HOSPADM

## 2022-04-13 RX ORDER — CLOPIDOGREL BISULFATE 75 MG/1
75 TABLET ORAL DAILY
Status: DISCONTINUED | OUTPATIENT
Start: 2022-04-13 | End: 2022-04-15 | Stop reason: HOSPADM

## 2022-04-13 RX ORDER — ATORVASTATIN CALCIUM 80 MG/1
80 TABLET, FILM COATED ORAL NIGHTLY
Status: DISCONTINUED | OUTPATIENT
Start: 2022-04-13 | End: 2022-04-15 | Stop reason: HOSPADM

## 2022-04-13 RX ORDER — SODIUM CHLORIDE 0.9 % (FLUSH) 0.9 %
10 SYRINGE (ML) INJECTION AS NEEDED
Status: DISCONTINUED | OUTPATIENT
Start: 2022-04-13 | End: 2022-04-15 | Stop reason: HOSPADM

## 2022-04-13 RX ADMIN — CLOPIDOGREL 75 MG: 75 TABLET, FILM COATED ORAL at 20:50

## 2022-04-13 RX ADMIN — Medication 10 ML: at 09:41

## 2022-04-13 RX ADMIN — FLUTICASONE PROPIONATE 2 SPRAY: 50 SPRAY, METERED NASAL at 09:42

## 2022-04-13 RX ADMIN — TAMSULOSIN HYDROCHLORIDE 0.4 MG: 0.4 CAPSULE ORAL at 09:42

## 2022-04-13 RX ADMIN — INSULIN LISPRO 6 UNITS: 100 INJECTION, SOLUTION INTRAVENOUS; SUBCUTANEOUS at 13:31

## 2022-04-13 RX ADMIN — Medication 1000 UNITS: at 09:41

## 2022-04-13 RX ADMIN — Medication 10 ML: at 20:50

## 2022-04-13 RX ADMIN — ATORVASTATIN CALCIUM 80 MG: 80 TABLET, FILM COATED ORAL at 20:50

## 2022-04-13 RX ADMIN — LEVOTHYROXINE SODIUM 88 MCG: 0.09 TABLET ORAL at 09:42

## 2022-04-13 RX ADMIN — INSULIN LISPRO 8 UNITS: 100 INJECTION, SOLUTION INTRAVENOUS; SUBCUTANEOUS at 17:25

## 2022-04-13 RX ADMIN — ENTACAPONE 200 MG: 200 TABLET, FILM COATED ORAL at 09:42

## 2022-04-13 RX ADMIN — ISOSORBIDE MONONITRATE 30 MG: 30 TABLET ORAL at 09:41

## 2022-04-13 RX ADMIN — ENTACAPONE 200 MG: 200 TABLET, FILM COATED ORAL at 17:24

## 2022-04-13 RX ADMIN — INSULIN LISPRO 4 UNITS: 100 INJECTION, SOLUTION INTRAVENOUS; SUBCUTANEOUS at 09:42

## 2022-04-13 RX ADMIN — GADOBENATE DIMEGLUMINE 12 ML: 529 INJECTION, SOLUTION INTRAVENOUS at 12:32

## 2022-04-13 RX ADMIN — ENTACAPONE 200 MG: 200 TABLET, FILM COATED ORAL at 13:31

## 2022-04-13 RX ADMIN — CARBIDOPA AND LEVODOPA 2 TABLET: 25; 100 TABLET ORAL at 09:42

## 2022-04-13 RX ADMIN — CARBIDOPA AND LEVODOPA 2 TABLET: 25; 100 TABLET ORAL at 17:24

## 2022-04-13 RX ADMIN — ESCITALOPRAM 5 MG: 5 TABLET, FILM COATED ORAL at 09:42

## 2022-04-13 RX ADMIN — METOPROLOL SUCCINATE 25 MG: 25 TABLET, EXTENDED RELEASE ORAL at 09:41

## 2022-04-13 RX ADMIN — INSULIN GLARGINE-YFGN 8 UNITS: 100 INJECTION, SOLUTION SUBCUTANEOUS at 07:51

## 2022-04-13 RX ADMIN — LOSARTAN POTASSIUM 50 MG: 50 TABLET, FILM COATED ORAL at 09:42

## 2022-04-13 RX ADMIN — CARBIDOPA AND LEVODOPA 2 TABLET: 25; 100 TABLET ORAL at 13:31

## 2022-04-13 RX ADMIN — DONEPEZIL HYDROCHLORIDE 10 MG: 10 TABLET, FILM COATED ORAL at 21:55

## 2022-04-13 NOTE — PLAN OF CARE
Goal Outcome Evaluation:  Plan of Care Reviewed With: patient           Outcome Evaluation: Pt is an 82 y/o M with PMH of PD and multiple falls recently. Pt reports he lives with his wife and uses a rollator at baseline. Reports 5 JESSICA and having basement stairs, although pt states bedroom and full bathroom are on main level. Pt agreeable to work with PT this AM, Tara for bed mobility. Performs STS to RW and able to take side steps EOB 5' with seated rest break 3x. Pt demo's dec'd stride length, req's VC for initiating taking steps and also demo's posterior lean. Pt presents with dec'd strength, endurance, and balance and would benefit from skilled PT intervention. Rec D/C to SNF.

## 2022-04-13 NOTE — THERAPY EVALUATION
Patient Name: Griffin Angeles  : 1940    MRN: [id number]                              Today's Date: 2022       Admit Date: 2022    Visit Dx:     ICD-10-CM ICD-9-CM   1. Parkinson's disease (HCC)  G20 332.0   2. Frequent falls  R29.6 V15.88   3. Abrasion of nose, initial encounter  S00.31XA 910.0   4. 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)   • Type 1 diabetes mellitus with proteinuria (HCC)   • Chronic nonspecific colitis   • Multiple lacunar infarcts (HCC)   • Type 1 diabetes mellitus with complications (HCC)   • Cholesterol retinal embolus of left eye   • Parkinson's disease (HCC)   • Fall at home   • Vascular dementia (HCC)   • History of CVA (cerebrovascular accident)   • Cerebral ventriculomegaly   • Nausea vomiting and diarrhea     Past Medical History:   Diagnosis Date   • BPH (benign prostatic hypertrophy)    • CAD (coronary artery disease)    • Dementia (HCC)    • Hydrocephalus (HCC)    • Hyperlipidemia    • Hyperparathyroidism (HCC)    • Hypertension    • Hypothyroidism    • Lacunar infarction (HCC)     Bilateral basal ganglia   • Parkinsonism (HCC)    • Postherpetic neuralgia    • Shingles 2018   • Stroke (HCC)    • Type 2 diabetes mellitus (HCC)      Past Surgical History:   Procedure Laterality Date   • CATARACT EXTRACTION Bilateral 2017   • COLONOSCOPY  2017    Chronic inflammation.  Dr. Benoit.  [facility]   • CORONARY ANGIOPLASTY WITH STENT PLACEMENT     • HEMORRHOIDECTOMY     • PARATHYROIDECTOMY     • PENILE  PROSTHESIS IMPLANT  2016    Dr. [facility] at [facility]   • RETINAL LASER PROCEDURE Right    • TURP / TRANSURETHRAL INCISION / DRAINAGE PROSTATE        General Information     Row Name 04/13/22 1201          OT Time and Intention    Document Type evaluation  -     Mode of Treatment individual therapy;occupational therapy  -     Row Name 04/13/22 1201          General Information    Patient Profile Reviewed yes  -KA     Prior Level of Function --  pt reported requiring assistance from wife for ADLs and using a RW at home  -     Existing Precautions/Restrictions fall  -     Barriers to Rehab cognitive status  -     Row Name 04/13/22 1201          Living Environment    People in Home spouse  -     Row Name 04/13/22 1201          Home Main Entrance    Number of Stairs, Main Entrance --  pt reported stairs entering home, he was unsure about how many. Poor historian this AM  -     Row Name 04/13/22 1201          Cognition    Orientation Status (Cognition) oriented x 3  Couldn't recall situation or why he was in hospital  -     Row Name 04/13/22 1201          Safety Issues, Functional Mobility    Safety Issues Affecting Function (Mobility) impulsivity;insight into deficits/self-awareness;awareness of need for assistance;sequencing abilities  -     Impairments Affecting Function (Mobility) balance;endurance/activity tolerance;range of motion (ROM);postural/trunk control;strength  -     Comment, Safety Issues/Impairments (Mobility) non skid socks and gait belt donned for safety  -           User Key  (r) = Recorded By, (t) = Taken By, (c) = Cosigned By    Initials Name Provider Type    Clover Espino OT Occupational Therapist                 Mobility/ADL's     Row Name 04/13/22 1206          Bed Mobility    Bed Mobility supine-sit;sit-supine;scooting/bridging  -     Scooting/Bridging [place] (Bed Mobility) moderate assist (50% patient effort)  attempted to assist with LE's  -KA      Supine-Sit [place] (Bed Mobility) moderate assist (50% patient effort)  required assistance bringing trunk to midline  -     Sit-Supine [place] (Bed Mobility) moderate assist (50% patient effort)  -     Assistive Device (Bed Mobility) draw sheet;bed rails;head of bed elevated  -     Row Name 04/13/22 1206          Activities of Daily Living    BADL Assessment/Intervention bathing;upper body dressing;lower body dressing;toileting  -     Row Name 04/13/22 1206          Bathing Assessment/Intervention    [place] Level (Bathing) bathing skills;upper body;minimum assist (75% patient effort);verbal cues;lower body;moderate assist (50% patient effort)  -KA     Position (Bathing) edge of bed sitting  -     Comment, (Bathing) Pt sat EOB and washed UB with min A and verbal cues for sequencing. Required min- mod A for sitting balance, as he fatigued required more assistance. Completed bathing supine in bed due to pt fatiguing and decreased dynamic sitting balance  -     Row Name 04/13/22 1206          Upper Body Dressing Assessment/Training    [place] Level (Upper Body Dressing) don;front opening garment;minimum assist (75% patient effort);verbal cues  -KA     Position (Upper Body Dressing) supine  -     Row Name 04/13/22 1206          Lower Body Dressing Assessment/Training    [place] Level (Lower Body Dressing) don;doff;socks;dependent (less than 25% patient effort)  -KA     Position (Lower Body Dressing) edge of bed sitting  -     Row Name 04/13/22 1206          Toileting Assessment/Training    [place] Level (Toileting) toileting skills;dependent (less than 25% patient effort)  brief donned, incont  -     Position (Toileting) supine  -           User Key  (r) = Recorded By, (t) = Taken By, (c) = Cosigned By    Initials Name Provider Type    Clover Espino OT Occupational Therapist               Obj/Interventions     Row Name 04/13/22 1210          Sensory Assessment  (Somatosensory)    Sensory Assessment reported no sensory issues  -[facility] Name 04/13/22 1210          Range of Motion Comprehensive    General Range of Motion bilateral upper extremity ROM WFL  -[facility] Name 04/13/22 1210          Strength Comprehensive (MMT)    General Manual Muscle Testing (MMT) Assessment upper extremity strength deficits identified  -     Comment, General Manual Muscle Testing (MMT) Assessment generalized weakness, grossly 3-/5 BUE  -[facility] Name 04/13/22 1210          Motor Skills    Motor Skills functional endurance;posture  -     Functional Endurance Required min-mod A to sit EOB for grooming tasks, completed bathing in supine due to decreased endurance and dynamic sitting balance  -[facility] Name 04/13/22 1210          Balance    Balance Assessment sitting static balance;sitting dynamic balance  -     Static Sitting Balance minimal assist  -     Dynamic Sitting Balance moderate assist;maximum assist  required more assistance as he fatigued  -     Position, Sitting Balance sitting edge of bed  -     Balance Interventions occupation based/functional task;dynamic reaching  -     Comment, Balance Pt demonstrated posterior lean during bathing and dressing tasks sitting EOB  -           User Key  (r) = Recorded By, (t) = Taken By, (c) = Cosigned By    Initials Name Provider Type    Clover Espino, OT Occupational Therapist               Goals/Plan     [facility] Name 04/13/22 1220          Transfer Goal 1 (OT)    Activity/Assistive Device (Transfer Goal 1, OT) toilet  -     [place] Level/Cues Needed (Transfer Goal 1, OT) minimum assist (75% or more patient effort)  -     Time Frame (Transfer Goal 1, OT) 2 weeks  -     Progress/Outcome (Transfer Goal 1, OT) goal ongoing  -KA     Row Name 04/13/22 1220          Bathing Goal 1 (OT)    Activity/Device (Bathing Goal 1, OT) bathing skills, all  -     [place] Level/Cues Needed (Bathing Goal 1, OT) minimum assist  (75% or more patient effort)  -     Time Frame (Bathing Goal 1, OT) 2 weeks  -     Row Name 04/13/22 1220          Toileting Goal 1 (OT)    Activity/Device (Toileting Goal 1, OT) toileting skills, all  -KA     [place] Level/Cues Needed (Toileting Goal 1, OT) moderate assist (50-74% patient effort)  -     Time Frame (Toileting Goal 1, OT) 2 weeks  -     Row Name 04/13/22 1220          Grooming Goal 1 (OT)    Activity/Device (Grooming Goal 1, OT) grooming skills, all  -KA     [place] (Grooming Goal 1, OT) standby assist;contact guard required  -     Time Frame (Grooming Goal 1, OT) 2 weeks  -     Row Name 04/13/22 1220          Therapy Assessment/Plan (OT)    Planned Therapy Interventions (OT) activity tolerance training;functional balance retraining;BADL retraining;patient/caregiver education/training;transfer/mobility retraining;strengthening exercise;ROM/therapeutic exercise;occupation/activity based interventions  -           User Key  (r) = Recorded By, (t) = Taken By, (c) = Cosigned By    Initials Name Provider Type    Clover Espino, OT Occupational Therapist               Clinical Impression     Row Name 04/13/22 1212          Pain Assessment    Pretreatment Pain Rating 0/10 - no pain  -     Posttreatment Pain Rating 0/10 - no pain  -     Row Name 04/13/22 1212          Plan of Care Review    Plan of Care Reviewed With patient  -     Progress no change  -     Outcome Evaluation Pt seen by OT this AM for eval. Pt Ox3, stated he was confused throughout session. Reported living with his wife and using a RW at home. He reported requiring assistance from wife for ADLs. Pt presented today with decreased sitting balance, endurance, activity tolerance, strength all required for participation with ADLs. Sat EOB and and washed UB with min A and verbal cues for initiation and follow through with task. As pt fatigued, dynamic sitting balance decreased and pt demonstrated posterior lean  and requested to lay back down. Bathing was completed in supine where he required A to wash LB. Pt dependent with toileting and donning socks this session. Pt will benefit from skilled OT services to address deficits and maximize independence. Depending on pt progress, SNF recommended for safety at discharge.  -     Row Name 04/13/22 1212          Therapy Assessment/Plan (OT)    Patient/Family Therapy Goal Statement (OT) Go home with wife  -     Criteria for Skilled Therapeutic Interventions Met (OT) yes  -KA     Therapy Frequency (OT) 5 times/wk  -     Row Name 04/13/22 1212          Vital Signs    Pre Patient Position Supine  -KA     Intra Patient Position Sitting  -KA     Post Patient Position Supine  -KA     Row Name 04/13/22 1212          Positioning and Restraints    Pre-Treatment Position in bed  -KA     Post Treatment Position bed  -KA     In Bed notified nsg;supine;call light within reach;encouraged to call for assist;exit alarm on  -KA           User Key  (r) = Recorded By, (t) = Taken By, (c) = Cosigned By    Initials Name Provider Type    Clover Espino OT Occupational Therapist               Outcome Measures     Row Name 04/13/22 1228          How much help from another is currently needed...    Putting on and taking off regular lower body clothing? 2  -KA     Bathing (including washing, rinsing, and drying) 2  -KA     Toileting (which includes using toilet bed pan or urinal) 1  -KA     Putting on and taking off regular upper body clothing 3  -KA     Taking care of personal grooming (such as brushing teeth) 3  -KA     Eating meals 3  -KA     AM-PAC 6 Clicks Score (OT) 14  -KA     [facility] Name 04/13/22 1228          Functional Assessment    Outcome Measure Options AM-PAC 6 Clicks Daily Activity (OT)  -KA           User Key  (r) = Recorded By, (t) = Taken By, (c) = Cosigned By    Initials Name Provider Type    Clover Espino OT Occupational Therapist                Occupational Therapy  Education                 Title: PT OT SLP Therapies (Done)     Topic: Occupational Therapy (Done)     Point: ADL training (Done)     Description:   Instruct learner(s) on proper safety adaptation and remediation techniques during self care or transfers.   Instruct in proper use of assistive devices.              Learning Progress Summary           Patient Acceptance, E, VU by ARVIND at 4/13/2022 1229                   Point: Precautions (Done)     Description:   Instruct learner(s) on prescribed precautions during self-care and functional transfers.              Learning Progress Summary           Patient Acceptance, E, VU by ARVIND at 4/13/2022 1229                               User Key     Initials Effective Dates Name Provider Type Discipline    ARVIND 02/22/22 -  Clover Cui OT Occupational Therapist OT              OT Recommendation and Plan  Planned Therapy Interventions (OT): activity tolerance training, functional balance retraining, BADL retraining, patient/caregiver education/training, transfer/mobility retraining, strengthening exercise, ROM/therapeutic exercise, occupation/activity based interventions  Therapy Frequency (OT): 5 times/wk  Plan of Care Review  Plan of Care Reviewed With: patient  Progress: no change  Outcome Evaluation: Pt seen by OT this AM for eval. Pt Ox3, stated he was confused throughout session. Reported living with his wife and using a RW at home. He reported requiring assistance from wife for ADLs. Pt presented today with decreased sitting balance, endurance, activity tolerance, strength all required for participation with ADLs. Sat EOB and and washed UB with min A and verbal cues for initiation and follow through with task. As pt fatigued, dynamic sitting balance decreased and pt demonstrated posterior lean and requested to lay back down. Bathing was completed in supine where he required A to wash LB. Pt dependent with toileting and donning socks this session. Pt will benefit from  skilled OT services to address deficits and maximize independence. Depending on pt progress, SNF recommended for safety at discharge.     Time Calculation:    Time Calculation- OT     Row Name 04/13/22 1232             Time Calculation- OT    OT Start Time 0950  -KA      OT Stop Time 1019  -KA      OT Time Calculation (min) 29 min  -KA      Total Timed Code Minutes- OT 19 minute(s)  -KA      OT Received On 04/13/22  -KA      OT - Next Appointment 04/14/22  -KA      OT Goal Re-Cert Due Date 04/27/22  -KA              Timed Charges    12270 - OT Self Care/Mgmt Minutes 19  -KA              Untimed Charges    OT Eval/Re-eval Minutes 10  -KA              Total Minutes    Timed Charges Total Minutes 19  -KA      Untimed Charges Total Minutes 10  -KA       Total Minutes 29  -KA            User Key  (r) = Recorded By, (t) = Taken By, (c) = Cosigned By    Initials Name Provider Type    Yamileth Espino OT Occupational Therapist              Therapy Charges for Today     Code Description Service Date Service Provider Modifiers Qty    [id number] HC OT SELF CARE/MGMT/TRAIN EA 15 MIN 4/13/2022 Yamileth Cui OT GO 1    [id number] HC OT EVAL MOD COMPLEXITY 2 4/13/2022 Yamileth Cui OT GO 1               YAMILETH CUI OT  4/13/2022

## 2022-04-13 NOTE — PLAN OF CARE
Goal Outcome Evaluation:  Plan of Care Reviewed With: patient        Progress: no change  Outcome Evaluation: Pt seen by OT this AM for eval. Pt Ox3, stated he was confused throughout session. Reported living with his wife and using a RW at home. He reported requiring assistance from wife for ADLs. Pt presented today with decreased sitting balance, endurance, activity tolerance, strength all required for participation with ADLs. Sat EOB and and washed UB with min A and verbal cues for initiation and follow through with task. As pt fatigued, dynamic sitting balance decreased and pt demonstrated posterior lean and requested to lay back down. Bathing was completed in supine where he required A to wash LB. Pt dependent with toileting and donning socks this session. Pt will benefit from skilled OT services to address deficits and maximize independence. Depending on pt progress, SNF recommended for safety at discharge. STS x2 with mod A and RW, took 2 steps to HOB. Therapy donned proper PPE and performed hand hygiene before and after treatment session.

## 2022-04-13 NOTE — PROGRESS NOTES
DOS: 2022  NAME: Griffin Angeles   : 1940  PCP: Jeronimo Quevedo MD    Chief Complaint   Patient presents with   • Fall   • Head Injury         Subjective: Pt seen in follow up, however the problem is new to me.  Sitting up in the bed.  He states he feels fine.  Denies any new weakness, numbness, speech or visual disturbances, or headaches.  Had some visual hallucinations last night and weird dreams while he was in the MRI scanner.  RN reports he had some confusion last night not knowing where he was.  Wife at bedside. Seen and examined with Dr. Casas.    Objective:  Vital signs:   Vitals:    22 0017 22 0548 22 1339   BP: 107/56 151/67 138/83 125/62   BP Location: Right arm Right arm Right arm Left arm   Patient Position: Sitting Lying Lying Lying   Pulse: 66 55 71 57   Resp: 16 16 16 16   Temp: 97.4 °F (36.3 °C) 97 °F (36.1 °C) 98.7 °F (37.1 °C) 96.3 °F (35.7 °C)   TempSrc: Oral Oral Oral Oral   SpO2: 98% 94% 93% 97%   Weight:       Height:           Current Facility-Administered Medications:   •  acetaminophen (TYLENOL) tablet 650 mg, 650 mg, Oral, Q4H PRN, 650 mg at 22 1153 **OR** acetaminophen (TYLENOL) 160 MG/5ML solution 650 mg, 650 mg, Oral, Q4H PRN **OR** acetaminophen (TYLENOL) suppository 650 mg, 650 mg, Rectal, Q4H PRN, Emely Mckinley MD  •  atorvastatin (LIPITOR) tablet 20 mg, 20 mg, Oral, Daily, Emely Mckinley MD, 20 mg at 22 0934  •  carbidopa-levodopa (SINEMET)  MG per tablet 2 tablet, 2 tablet, Oral, TID With Meals, Alvin Marley DO, 2 tablet at 22 1331  •  cholecalciferol (VITAMIN D3) tablet 1,000 Units, 1,000 Units, Oral, Daily, Emely Mckinley MD, 1,000 Units at 22 0941  •  dextrose (D50W) (25 g/50 mL) IV injection 25 g, 25 g, Intravenous, Q15 Min PRN, Emely Mckinley MD  •  dextrose (GLUTOSE) oral gel 15 g, 15 g, Oral, Q15 Min PRN, Emely Mckinley MD  •  donepezil  (ARICEPT) tablet 10 mg, 10 mg, Oral, Nightly, Emely Mckinley MD, 10 mg at 04/12/22 2031  •  entacapone (COMTAN) tablet 200 mg, 200 mg, Oral, TID With Meals, Neisha Casas MD, 200 mg at 04/13/22 1331  •  escitalopram (LEXAPRO) tablet 5 mg, 5 mg, Oral, Daily, Emely Mckinley MD, 5 mg at 04/13/22 0942  •  fluticasone (FLONASE) 50 MCG/ACT nasal spray 2 spray, 2 spray, Nasal, Daily, Emely Mckinley MD, 2 spray at 04/13/22 0942  •  glucagon (human recombinant) (GLUCAGEN DIAGNOSTIC) injection 1 mg, 1 mg, Intramuscular, Q15 Min PRN, Emely Mckinley MD  •  insulin glargine (LANTUS, SEMGLEE) injection 8 Units, 8 Units, Subcutaneous, Ayaka RIVERA Francis J, DO, 8 Units at 04/13/22 0751  •  insulin lispro (ADMELOG) injection 0-9 Units, 0-9 Units, Subcutaneous, TID With Meals, Emely Mckinley MD, 6 Units at 04/13/22 1331  •  isosorbide mononitrate (IMDUR) 24 hr tablet 30 mg, 30 mg, Oral, Daily, Emely Mckinley MD, 30 mg at 04/13/22 0941  •  levothyroxine (SYNTHROID, LEVOTHROID) tablet 88 mcg, 88 mcg, Oral, Daily, Emely Mckinley MD, 88 mcg at 04/13/22 0942  •  losartan (COZAAR) tablet 50 mg, 50 mg, Oral, Daily, Emely Mckinley MD, 50 mg at 04/13/22 0942  •  metoprolol succinate XL (TOPROL-XL) 24 hr tablet 25 mg, 25 mg, Oral, Daily, Emely Mckinley MD, 25 mg at 04/13/22 0941  •  ondansetron (ZOFRAN) injection 4 mg, 4 mg, Intravenous, Q6H PRN, Emely Mckinley MD  •  [COMPLETED] Insert peripheral IV, , , Once **AND** sodium chloride 0.9 % flush 10 mL, 10 mL, Intravenous, PRN, Cy Wang MD, 10 mL at 04/13/22 0941  •  tamsulosin (FLOMAX) 24 hr capsule 0.4 mg, 0.4 mg, Oral, Daily, Stingl, Emely Rogers MD, 0.4 mg at 04/13/22 0942    PRN meds  •  acetaminophen **OR** acetaminophen **OR** acetaminophen  •  dextrose  •  dextrose  •  glucagon (human recombinant)  •  ondansetron  •  [COMPLETED] Insert peripheral IV **AND** sodium chloride    No  current facility-administered medications on file prior to encounter.     Current Outpatient Medications on File Prior to Encounter   Medication Sig   • atorvastatin (LIPITOR) 20 MG tablet TAKE 1 TABLET DAILY   • brompheniramine-pseudoephedrine-DM 30-2-10 MG/5ML syrup Take 5 mL by mouth.   • carbidopa-levodopa (SINEMET)  MG per tablet 2 tablets 3 (Three) Times a Day. 2 tablets in the morning 1 tablet at lunch and 1  Hs    Now taking 2 TID per wife   • Cholecalciferol (VITAMIN D3) 1000 units capsule Take 1 capsule by mouth Daily.   • clopidogrel (PLAVIX) 75 MG tablet TAKE 1 TABLET DAILY   • Continuous Blood Gluc Sensor (FreeStyle Dione 2 Sensor) misc 1 each Every 14 (Fourteen) Days. Use every 14 days.  Dx: E 1   • donepezil (ARICEPT) 10 MG tablet TAKE 1 TABLET BY MOUTH ONCE DAILY AT NIGHT   • escitalopram (LEXAPRO) 5 MG tablet Take 10 mg by mouth Daily.   • fluticasone (FLONASE) 50 MCG/ACT nasal spray 2 sprays into the nostril(s) as directed by provider Daily As Needed.   • insulin degludec (Tresiba FlexTouch) 100 UNIT/ML solution pen-injector injection INJECT 8 UNITS SUBCUTANEOUSLY IN THE MORNING AS DIRECTED INTO  APPROPRIATE  AREA   • isosorbide mononitrate (IMDUR) 30 MG 24 hr tablet TAKE 1 TABLET DAILY   • levothyroxine (SYNTHROID, LEVOTHROID) 88 MCG tablet TAKE 1 TABLET DAILY   • losartan (COZAAR) 50 MG tablet Take 50 mg by mouth Daily.   • metoprolol succinate XL (TOPROL-XL) 25 MG 24 hr tablet 25 mg Daily. 1/2 tablet daily   • tamsulosin (FLOMAX) 0.4 MG capsule 24 hr capsule 2 capsules Daily.   • ACCU-CHEK SOFTCLIX LANCETS lancets Dx code E10.8 testing bs 3 x day   • BD Pen Needle Aydee 2nd Gen 32G X 4 MM misc USE AS DIRECTED 4 TIMES A  DAY   • FREESTYLE PRECISION JEREMIAS TEST test strip Dx code E10.43 Testing bs 4 x day   • glucagon (GLUCAGON EMERGENCY) 1 MG injection Inject 1 mg under the skin into the appropriate area as directed 1 (One) Time As Needed for Low Blood Sugar.   • Insulin Syringe-Needle U-100  "(RELION INSULIN SYRINGE) 31G X 15/64\" 0.5 ML misc Using 3 syringes daily   • NovoLOG FlexPen 100 UNIT/ML solution pen-injector sc pen INJECT 5-6 UNITS SUBCUTANEOUSLY AT BREAKFAST, 5-6 UNITS AT LUNCH AND 5-6 UNITS AT DINNER       General appearance: Elderly frail male, NAD, flat affect, alert and cooperative, well groomed  HEENT: Normocephalic, atraumatic, PERRL-2mm bilaterally, no masses or tenderness  COR: RRR  Resp: Even and unlabored  Extremities: No obvious edema.  Skin: warm, dry    Neurological:   MS: oriented x3, recent/remote memory impaired, normal attention/concentration, language delayed, no neglect  CN: visual acuity grossly normal, visual fields full, EOMI, facial sensation equal, no facial droop, [place] bilaterally, palate elevates symmetrically, shoulder shrug equal, tongue midline  Motor: 5/5 in all 4 ext.  Rigidity of LUE  Sensory: light touch sensation intact in all 4 ext.  Coordination: Normal finger to nose test  Gait and station: Deferred  Rapid alternating movements: normal finger to thumb tap    Laboratory results:  Lab Results   Component Value Date    TSH 1.050 03/11/2022     Lab Results   Component Value Date    GXFVEKYI12 1,033 (H) 09/21/2017     Lab Results   Component Value Date    HGBA1C 7.00 (H) 04/12/2022     Lab Results   Component Value Date    GLUCOSE 238 (H) 04/13/2022    BUN 27 (H) 04/13/2022    CREATININE 1.01 04/13/2022    EGFRIFNONA 65 07/29/2021    EGFRIFAFRI 75 07/29/2021    BCR 26.7 (H) 04/13/2022    K 4.5 04/13/2022    CO2 23.8 04/13/2022    CALCIUM 9.0 04/13/2022    PROTENTOTREF 6.5 03/11/2022    ALBUMIN 3.00 (L) 04/13/2022    LABIL2 1.7 03/11/2022    AST 7 04/13/2022    ALT 7 04/13/2022     Lab Results   Component Value Date    WBC 9.32 04/13/2022    HGB 12.2 (L) 04/13/2022    HCT 37.8 04/13/2022    MCV 89.4 04/13/2022     04/13/2022     Brief Urine Lab Results  (Last result in the past 365 days)      Color   Clarity   Blood   Leuk Est   Nitrite   Protein   CREAT   " Urine HCG        08/05/21 1458             64.5             No results found for: ACANTHNAEG, AFBCX, BPERTUSSISCX, BLOODCX  No results found for: BCIDPCR, CXREFLEX, CSFCX, CULTURETIS  No results found for: CULTURES, HSVCX, URCX  No results found for: EYECULTURE, GCCX, HSVCULTURE, LABHSV  No results found for: LEGIONELLA, MRSACX, MUMPSCX, MYCOPLASCX  No results found for: NOCARDIACX, STOOLCX  No results found for: THROATCX, UNSTIMCULT, URINECX, CULTURE, VZVCULTUR  No results found for: VIRALCULTU, WOUNDCX  Pain Management Panel     Pain Management Panel Latest Ref Rng & Units 8/5/2021 7/29/2021    CREATININE UR Not Estab. mg/dL 64.5 CANCELED          Review and interpretation of imaging:  CT Head Without Contrast    Result Date: 4/11/2022   No evidence for acute traumatic intracranial pathology.  The ventricles are system is relatively prominent in size as compared to the size of the sulci near the vertex raising concern for normal pressure hydrocephalus in the appropriate clinical setting and correlation with clinical data is suggested. When compared to the previous examination dated 02/28/2020, the ventricular system is stable to perhaps minimally larger in size.  Incidental note is made of mild to moderate changes of chronic small vessel ischemic phenomena and old lacunar disease.   TECHNIQUE: CT scan of the cervical spine was obtained with 1 mm axial bone algorithm and 2 mm axial soft tissue algorithm images. Sagittal and coronal reconstructed images were obtained.  FINDINGS:  There is no evidence for acute fracture or bony malalignment within the cervical spine.  Disc osteophyte complexes at C5-6 and C6-7 result in relatively mild degrees of canal narrowing. Uncovertebral joint hypertrophy at C5-6 and C6-7 result in relatively mild degrees of foraminal narrowing. Mild-to-moderate bilateral foraminal narrowing is seen at C3-4 secondary to a combination of uncovertebral joint hypertrophy and facet arthrosis and a  disc osteophyte complex at C3-4 results in a mild degree of canal narrowing.  Incidental note is made of a stable 6 mm nodular focus within the right lung apex that is unchanged in appearance when compared to the prior cervical spine CT study dated 03/05/2017. The stability favors a benign etiology.  IMPRESSION:  No evidence for acute fracture or bony malalignment involving the cervical spine.  Multilevel degenerative phenomena within the cervical spine are incidentally noted and discussed in detail above.   Radiation dose reduction techniques were utilized, including automated exposure control and exposure modulation based on body size.  This report was finalized on 4/11/2022 12:22 PM by Dr. Gerardo Ledesma M.D.      CT Cervical Spine Without Contrast    Result Date: 4/11/2022   No evidence for acute traumatic intracranial pathology.  The ventricles are system is relatively prominent in size as compared to the size of the sulci near the vertex raising concern for normal pressure hydrocephalus in the appropriate clinical setting and correlation with clinical data is suggested. When compared to the previous examination dated 02/28/2020, the ventricular system is stable to perhaps minimally larger in size.  Incidental note is made of mild to moderate changes of chronic small vessel ischemic phenomena and old lacunar disease.   TECHNIQUE: CT scan of the cervical spine was obtained with 1 mm axial bone algorithm and 2 mm axial soft tissue algorithm images. Sagittal and coronal reconstructed images were obtained.  FINDINGS:  There is no evidence for acute fracture or bony malalignment within the cervical spine.  Disc osteophyte complexes at C5-6 and C6-7 result in relatively mild degrees of canal narrowing. Uncovertebral joint hypertrophy at C5-6 and C6-7 result in relatively mild degrees of foraminal narrowing. Mild-to-moderate bilateral foraminal narrowing is seen at C3-4 secondary to a combination of uncovertebral joint  hypertrophy and facet arthrosis and a disc osteophyte complex at C3-4 results in a mild degree of canal narrowing.  Incidental note is made of a stable 6 mm nodular focus within the right lung apex that is unchanged in appearance when compared to the prior cervical spine CT study dated 03/05/2017. The stability favors a benign etiology.  IMPRESSION:  No evidence for acute fracture or bony malalignment involving the cervical spine.  Multilevel degenerative phenomena within the cervical spine are incidentally noted and discussed in detail above.   Radiation dose reduction techniques were utilized, including automated exposure control and exposure modulation based on body size.  This report was finalized on 4/11/2022 12:22 PM by Dr. Gerardo Ledesma M.D.      XR Spine Lumbar Complete 4+VW    Result Date: 4/11/2022  1.  Levoscoliotic curvature of the lumbar spine with degenerative disc disease and facet arthritis. No evidence for fracture or acute abnormality of the lumbar spine. 2.  Left renal stones.  This report was finalized on 4/11/2022 1:48 PM by Dr. Tiago French M.D.      Results for orders placed during the hospital encounter of 11/04/21    Adult Transthoracic Echo Complete W/ Cont if Necessary Per Protocol    Interpretation Summary  · Calculated left ventricular EF = 56.3% Estimated left ventricular EF was in agreement with the calculated left ventricular EF.  · Left ventricular diastolic function was normal.  · There is no evidence of pericardial effusion. .        Impression/Assessment:  This is a 81-year-old male with past medical history of CAD, Parkinson's disease on Sinemet PTA, hydrocephalus felt to be not a  shunt candidate, hyperlipidemia, hypertension, hypothyroidism, basal ganglia strokes on Plavix 75mg and Lipitor 20 mg PTA, diabetes, dementia who presented to the hospital on 4/11/2022 with complaints of suffering a fall and hitting his head.  Reportedly he was sitting on the commode and fell  forward and hit his head on the floor and his wife was unable to get him up therefore she called EMS.  She states he actually fell the night before admission as well.  Falls have been becoming more frequent.  His wife is his primary caretaker.  He is followed by a neurologist in [place] for his Parkinson's disease.    We were consulted due to his frequent falls and his Parkinson's disease.  He was initially evaluated by Dr. Casas who ordered an MRI brain with and without to look for hydrocephalus as well new strokes given his history of bilateral basal ganglia infarcts.  He also added Comtan 200 mg 3 times daily to his Parkinson's disease treatment regimen.    His MRI brain does show a punctate acute right corona radiata infarct, appears lacunar.  Ventriculomegaly also noted.  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  We will check stroke labs including A1c, TSH, B12, and lipid panel.  We will also check 2D echo and carotid duplex.  Wife reports he was compliant with his Plavix daily PTA, was going to check a P2Y12 however his Plavix was not continued on admission due to his fall and bleeding so he has not received a dose in at least 2 days therefore the P2Y12 would not be accurate.   Discussed with Dr. Casas, we will continue him on Plavix 75 mg daily and he will need better risk factor control.  BP goal <130/80.  LDL goal <70.  A1c goal <7.0  Continue Comtan 200mg TID, he is having good response to it.  Continue carbidopa/levodopa 25-100mg 2 tablets 3 times daily with meals.  Neurochecks per stroke protocol.  Stroke education  Normalize BP  PT/OT/ST  Therapies as written. Call RRT for any acute neurological changes.   We  will continue to follow and advise.    Case discussed with patient, wife, RN, and Dr. Casas, and he agrees with plan above.     JOE Rodriguez

## 2022-04-13 NOTE — THERAPY EVALUATION
Patient Name: Griffin Angeles  : 1940    MRN: [id number]                              Today's Date: 2022       Admit Date: 2022    Visit Dx:     ICD-10-CM ICD-9-CM   1. Parkinson's disease (HCC)  G20 332.0   2. Frequent falls  R29.6 V15.88   3. Abrasion of nose, initial encounter  S00.31XA 910.0   4. 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)   • Type 1 diabetes mellitus with proteinuria (HCC)   • Chronic nonspecific colitis   • Multiple lacunar infarcts (HCC)   • Type 1 diabetes mellitus with complications (HCC)   • Cholesterol retinal embolus of left eye   • Parkinson's disease (HCC)   • Fall at home   • Vascular dementia (HCC)   • History of CVA (cerebrovascular accident)   • Cerebral ventriculomegaly   • Nausea vomiting and diarrhea     Past Medical History:   Diagnosis Date   • BPH (benign prostatic hypertrophy)    • CAD (coronary artery disease)    • Dementia (HCC)    • Hydrocephalus (HCC)    • Hyperlipidemia    • Hyperparathyroidism (HCC)    • Hypertension    • Hypothyroidism    • Lacunar infarction (HCC)     Bilateral basal ganglia   • Parkinsonism (HCC)    • Postherpetic neuralgia    • Shingles 2018   • Stroke (HCC)    • Type 2 diabetes mellitus (HCC)      Past Surgical History:   Procedure Laterality Date   • CATARACT EXTRACTION Bilateral 2017   • COLONOSCOPY  2017    Chronic inflammation.  Dr. Benoit.  [facility]   • CORONARY ANGIOPLASTY WITH STENT PLACEMENT     • HEMORRHOIDECTOMY     • PARATHYROIDECTOMY     • PENILE  PROSTHESIS IMPLANT  2016    Dr[facility] at [facility]   • RETINAL LASER PROCEDURE Right    • TURP / TRANSURETHRAL INCISION / DRAINAGE PROSTATE        General Information     Row Name 04/13/22 1215          Physical Therapy Time and Intention    Document Type evaluation  -DB     Mode of Treatment individual therapy;physical therapy  -DB     Row Name 04/13/22 1215          General Information    Patient Profile Reviewed yes  -DB     Prior Level of Function --  Per notes, pt's wife assists with ADL's  -DB     Existing Precautions/Restrictions fall  -DB     Barriers to Rehab cognitive status  -DB     Row Name 04/13/22 1215          Living Environment    People in Home spouse  -DB     Row Name 04/13/22 1215          Home Main Entrance    Number of Stairs, Main Entrance five  -DB     Row Name 04/13/22 1215          Stairs Within Home, Primary    Stairs, Within Home, Primary pt reports there is a basement, denies need to go downstairs  -DB     Row Name 04/13/22 1215          Safety Issues, Functional Mobility    Safety Issues Affecting Function (Mobility) ability to follow commands;awareness of need for assistance;impulsivity;insight into deficits/self-awareness;sequencing abilities  -DB     Impairments Affecting Function (Mobility) balance;endurance/activity tolerance;postural/trunk control;strength;coordination  -DB     Comment, Safety Issues/Impairments (Mobility) nonskid socks and gait belt for safety  -DB           User Key  (r) = Recorded By, (t) = Taken By, (c) = Cosigned By    Initials Name Provider Type    DB Amberly Iqbal, TIARA Physical Therapist               Mobility     Row Name 04/13/22 1218          Bed Mobility    Bed Mobility supine-sit;sit-supine;scooting/bridging  -DB     Scooting/Bridging [place] (Bed Mobility) moderate assist (50% patient effort);verbal cues;nonverbal cues (demo/gesture)  -DB     Supine-Sit [place] (Bed Mobility) minimum assist (75% patient effort);verbal cues;nonverbal  cues (demo/gesture)  -DB     Sit-Supine [place] (Bed Mobility) minimum assist (75% patient effort);verbal cues;nonverbal cues (demo/gesture)  -DB     Assistive Device (Bed Mobility) draw sheet;bed rails;head of bed elevated  -DB     Comment, (Bed Mobility) inc'd time to complete activities  -DB     Row Name 04/13/22 1218          Sit-Stand Transfer    Sit-Stand [place] (Transfers) minimum assist (75% patient effort);verbal cues;nonverbal cues (demo/gesture)  -DB     Assistive Device (Sit-Stand Transfers) walker, front-wheeled  -DB     Row Name 04/13/22 1218          Gait/Stairs (Locomotion)    [place] Level (Gait) minimum assist (75% patient effort);verbal cues;nonverbal cues (demo/gesture)  -DB     Assistive Device (Gait) walker, front-wheeled  -DB     Distance in Feet (Gait) 5' side steps at EOB x 3 with seated rest breaks  -DB     Deviations/Abnormal Patterns (Gait) festinating/shuffling;stride length decreased;weight shifting decreased;base of support, narrow;gait speed decreased  -DB     Bilateral Gait Deviations forward flexed posture;heel strike decreased  -DB           User Key  (r) = Recorded By, (t) = Taken By, (c) = Cosigned By    Initials Name Provider Type    DB Amberly Iqbal PT Physical Therapist               Obj/Interventions     Row Name 04/13/22 1240          Range of Motion Comprehensive    General Range of Motion no range of motion deficits identified  -DB     Row Name 04/13/22 1240          Strength Comprehensive (MMT)    Comment, General Manual Muscle Testing (MMT) Assessment generalized weakness, BLE >3/5 MMT  -DB     Row Name 04/13/22 1240          Balance    Balance Assessment sitting static balance;sitting dynamic balance;standing static balance;standing dynamic balance  -DB     Static Sitting Balance standby assist  -DB     Dynamic Sitting Balance standby assist  -DB     Position, Sitting Balance unsupported;sitting edge of bed  -DB     Static Standing Balance verbal  cues;minimal assist  -DB     Dynamic Standing Balance minimal assist;verbal cues  -DB     Position/Device Used, Standing Balance supported;walker, front-wheeled  -DB     Balance Interventions sitting;standing;sit to stand  -DB           User Key  (r) = Recorded By, (t) = Taken By, (c) = Cosigned By    Initials Name Provider Type    DB Amberly Iqbal, PT Physical Therapist               Goals/Plan     Row Name 04/13/22 1252          Bed Mobility Goal 1 (PT)    Activity/Assistive Device (Bed Mobility Goal 1, PT) bed mobility activities, all  -DB     [place] Level/Cues Needed (Bed Mobility Goal 1, PT) supervision required  -DB     Time Frame (Bed Mobility Goal 1, PT) 1 week  -DB     Row Name 04/13/22 1252          Transfer Goal 1 (PT)    Activity/Assistive Device (Transfer Goal 1, PT) transfers, all  -DB     [place] Level/Cues Needed (Transfer Goal 1, PT) supervision required  -DB     Time Frame (Transfer Goal 1, PT) 1 week  -DB     Row Name 04/13/22 1252          Gait Training Goal 1 (PT)    Activity/Assistive Device (Gait Training Goal 1, PT) gait (walking locomotion)  -DB     [place] Level (Gait Training Goal 1, PT) standby assist  -DB     Time Frame (Gait Training Goal 1, PT) 1 week  -DB     Row Name 04/13/22 1252          Therapy Assessment/Plan (PT)    Planned Therapy Interventions (PT) balance training;bed mobility training;gait training;home exercise program;neuromuscular re-education;patient/family education;postural re-education;stair training;strengthening;transfer training  -DB           User Key  (r) = Recorded By, (t) = Taken By, (c) = Cosigned By    Initials Name Provider Type    DB Amberly Iqbal, PT Physical Therapist               Clinical Impression     Row Name 04/13/22 1241          Pain    Pain Intervention(s) Ambulation/increased activity;Repositioned  -DB     Row Name 04/13/22 1241          Plan of Care Review    Plan of Care Reviewed With patient  -DB     Outcome Evaluation  Pt is an 82 y/o M with PMH of PD and multiple falls recently. Pt reports he lives with his wife and uses a rollator at baseline. Reports 5 JESSICA and having basement stairs, although pt states bedroom and full bathroom are on main level. Pt agreeable to work with PT this AM, Tara for bed mobility. Performs STS to RW and able to take side steps EOB 5' with seated rest break 3x. Pt demo's dec'd stride length, req's VC for initiating taking steps and also demo's posterior lean. Pt presents with dec'd strength, endurance, and balance and would benefit from skilled PT intervention. Rec D/C to SNF.  -DB     Row Name 04/13/22 1241          Therapy Assessment/Plan (PT)    Rehab Potential (PT) good, to achieve stated therapy goals  -DB     Criteria for Skilled Interventions Met (PT) yes  -DB     Therapy Frequency (PT) 5 times/wk  -DB     Row Name 04/13/22 1241          Vital Signs    O2 Delivery Pre Treatment room air  -DB     O2 Delivery Intra Treatment room air  -DB     O2 Delivery Post Treatment room air  -DB     Pre Patient Position Supine  -DB     Intra Patient Position Standing  -DB     Post Patient Position Supine  -DB     Row Name 04/13/22 1241          Positioning and Restraints    Pre-Treatment Position in bed  -DB     Post Treatment Position bed  -DB     In Bed supine;call light within reach;encouraged to call for assist;exit alarm on  -DB           User Key  (r) = Recorded By, (t) = Taken By, (c) = Cosigned By    Initials Name Provider Type    Amberly Mccarty PT Physical Therapist               Outcome Measures     Row Name 04/13/22 1252          How much help from another person do you currently need...    Turning from your back to your side while in flat bed without using bedrails? 3  -DB     Moving from lying on back to sitting on the side of a flat bed without bedrails? 2  -DB     Moving to and from a bed to a chair (including a wheelchair)? 2  -DB     Standing up from a chair using your arms (e.g.,  wheelchair, bedside chair)? 3  -DB     Climbing 3-5 steps with a railing? 1  -DB     To walk in hospital room? 2  -DB     AM-PAC 6 Clicks Score (PT) 13  -DB     Row Name 04/13/22 1252 04/13/22 1228       Functional Assessment    Outcome Measure Options AM-PAC 6 Clicks Basic Mobility (PT)  -DB AM-PAC 6 Clicks Daily Activity (OT)  -ARVIND          User Key  (r) = Recorded By, (t) = Taken By, (c) = Cosigned By    Initials Name Provider Type    DB Amberly Iqbal, PT Physical Therapist    Clover Espino, OT Occupational Therapist                             Physical Therapy Education                 Title: PT OT SLP Therapies (In Progress)     Topic: Physical Therapy (In Progress)     Point: Mobility training (In Progress)     Learning Progress Summary           Patient Acceptance, E, NR by DB at 4/13/2022 1253                   Point: Home exercise program (In Progress)     Learning Progress Summary           Patient Acceptance, E, NR by DB at 4/13/2022 1253                   Point: Body mechanics (In Progress)     Learning Progress Summary           Patient Acceptance, E, NR by DB at 4/13/2022 1253                   Point: Precautions (In Progress)     Learning Progress Summary           Patient Acceptance, E, NR by DB at 4/13/2022 1253                               User Key     Initials Effective Dates Name Provider Type Discipline     06/16/21 -  Amberly Iqbal, PT Physical Therapist PT              PT Recommendation and Plan  Planned Therapy Interventions (PT): balance training, bed mobility training, gait training, home exercise program, neuromuscular re-education, patient/family education, postural re-education, stair training, strengthening, transfer training  Plan of Care Reviewed With: patient  Outcome Evaluation: Pt is an 80 y/o M with PMH of PD and multiple falls recently. Pt reports he lives with his wife and uses a rollator at baseline. Reports 5 JESSICA and having basement stairs, although pt states  bedroom and full bathroom are on main level. Pt agreeable to work with PT this AM, Tara for bed mobility. Performs STS to RW and able to take side steps EOB 5' with seated rest break 3x. Pt demo's dec'd stride length, req's VC for initiating taking steps and also demo's posterior lean. Pt presents with dec'd strength, endurance, and balance and would benefit from skilled PT intervention. Rec D/C to SNF.     Time Calculation:    PT Charges     Row Name 04/13/22 1253             Time Calculation    Start Time 1033  -DB      Stop Time 1049  -DB      Time Calculation (min) 16 min  -DB      PT Received On 04/13/22  -DB      PT - Next Appointment 04/14/22  -DB      PT Goal Re-Cert Due Date 04/20/22  -DB              Time Calculation- PT    Total Timed Code Minutes- PT 8 minute(s)  -DB            User Key  (r) = Recorded By, (t) = Taken By, (c) = Cosigned By    Initials Name Provider Type    DB Amberly Iqbal, PT Physical Therapist              Therapy Charges for Today     Code Description Service Date Service Provider Modifiers Qty    [id number]  PT EVAL MOD COMPLEXITY 2 4/13/2022 Amberly Iqbal, PT GP 1    [id number]  PT THERAPEUTIC ACT EA 15 MIN 4/13/2022 Amberly Iqbal, PT GP 1          PT G-Codes  Outcome Measure Options: AM-PAC 6 Clicks Basic Mobility (PT)  AM-PAC 6 Clicks Score (PT): 13  AM-PAC 6 Clicks Score (OT): 14    Amberly Iqbal PT  4/13/2022

## 2022-04-13 NOTE — PROGRESS NOTES
[facility]    [facility]    Name:  Griffin Angeles   Age:  81 y.o.  Sex:  male  :  1940  MRN:  [id number]   Visit Number:  [id number]  Admission Date:  2022  Date Of Service:  22  Primary Care Physician:  Jeronimo Quevedo MD     LOS: 0 days :  Patient Care Team:  Jeronimo Quevedo MD as PCP - General (Internal Medicine)  Moisés Kong MD as Consulting Physician (Cardiology)  Hay Love MD as Consulting Physician (Urology)  Rich Benoit MD as Consulting Physician (Ophthalmology)  RAULITO Acevedo MD as Consulting Physician (Ophthalmology)  Epifanio Silver MD as Consulting Physician (Endocrinology)  Fan Benoit MD as Surgeon (General Surgery)  Vikash Crocker DPM as Consulting Physician (Podiatry)  Will Ortega MD as Consulting Physician (Cardiology):    History taken from:     patient chart family    Chief Complaint:      Weakness    Subjective     Interval History:     Patient seen and examined again today.    Patient admitted for falls at home with weakness.  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  He has seen multiple neurologists at [facility] and follows with one in [place].  He has been tried on Sinemet to see if that would help, the wife states that this is not helping.    Did consult neurology for opinion.  Wife states that he was diagnosed with NPH, however he was felt not to be a candidate for shunt.     Neurology was consulted and felt that he did have Parkinson's disease and added entacapone 20 mg 3 times daily.  MRI was ordered which showed an acute right corona radiata infarct.  Patient was on Plavix prior to admission, this has been restarted.  He continues with PT, OT, speech therapy.    Patient himself is somewhat of a poor historian.  Answered all of the wife's questions.    Review of Systems:     Unable to obtain due to dementia.    Objective     Vital Signs:    Temp:  [96.3  °F (35.7 °C)-98.7 °F (37.1 °C)] 96.3 °F (35.7 °C)  Heart Rate:  [55-71] 57  Resp:  [16] 16  BP: (107-151)/(56-83) 125/62    Physical Exam:    General: Alert and oriented x2, mild distress.  Heart: Regular rate and rhythm without murmur rub or thrill.  Lungs: Clear to auscultation bilaterally without use of accessory muscles respiration.  Abdomen: Soft/nontender/nondistended.  No HSM noted.  MSK: 4/5 strength in upper/lower extremities bilaterally.  Weaker in his legs.  Neuro: Cranial nerves II through XII grossly intact.  No neuro deficits are noted.       Results Review:      I reviewed the patient's new clinical results.  I reviewed the patient's new imaging results and agree with the interpretation.  I reviewed the patient's other test results and agree with the interpretation    Labs:    Lab Results (last 24 hours)     Procedure Component Value Units Date/Time    POC Glucose Once [[id number]]  (Abnormal) Collected: 04/13/22 1301    Specimen: Blood Updated: 04/13/22 1303     Glucose 267 mg/dL      Comment: Meter: [id number] : [id number] Rich TELLO       Comprehensive Metabolic Panel [[id number]]  (Abnormal) Collected: 04/13/22 0632    Specimen: Blood from Arm, Left Updated: 04/13/22 0731     Glucose 238 mg/dL      BUN 27 mg/dL      Creatinine 1.01 mg/dL      Sodium 136 mmol/L      Potassium 4.5 mmol/L      Comment: Slight hemolysis detected by analyzer. Results may be affected.        Chloride 102 mmol/L      CO2 23.8 mmol/L      Calcium 9.0 mg/dL      Total Protein 5.9 g/dL      Albumin 3.00 g/dL      ALT (SGPT) 7 U/L      AST (SGOT) 7 U/L      Alkaline Phosphatase 220 U/L      Total Bilirubin 0.6 mg/dL      Globulin 2.9 gm/dL      A/G Ratio 1.0 g/dL      BUN/Creatinine Ratio 26.7     Anion Gap 10.2 mmol/L      eGFR 75.2 mL/min/1.73      Comment: National Kidney Foundation and American Society of Nephrology (ASN) Task Force recommended calculation based on the Chronic Kidney Disease Epidemiology  Collaboration (CKD-EPI) equation refit without adjustment for race.       Narrative:      GFR Normal >60  Chronic Kidney Disease <60  Kidney Failure <15      CBC (No Diff) [[id number]]  (Abnormal) Collected: 04/13/22 0632    Specimen: Blood from Arm, Left Updated: 04/13/22 0713     WBC 9.32 10*3/mm3      RBC 4.23 10*6/mm3      Hemoglobin 12.2 g/dL      Hematocrit 37.8 %      MCV 89.4 fL      MCH 28.8 pg      MCHC 32.3 g/dL      RDW 13.6 %      RDW-SD 43.9 fl      MPV 13.3 fL      Platelets 181 10*3/mm3     POC Glucose Once [[id number]]  (Abnormal) Collected: 04/13/22 0621    Specimen: Blood Updated: 04/13/22 0624     Glucose 197 mg/dL      Comment: Meter: [id number] : [id number] [place] NA       POC Glucose Once [[id number]]  (Abnormal) Collected: 04/12/22 2025    Specimen: Blood Updated: 04/12/22 2026     Glucose 294 mg/dL      Comment: Meter: [id number] : [id number] [place] NA       POC Glucose Once [[id number]]  (Abnormal) Collected: 04/12/22 1609    Specimen: Blood Updated: 04/12/22 1610     Glucose 327 mg/dL      Comment: Meter: [id number] : [id number] Page Moya CNA              Radiology:    Imaging Results (Last 24 Hours)     Procedure Component Value Units Date/Time    MRI Brain With & Without Contrast [[id number]] Collected: 04/13/22 1431     Updated: 04/13/22 1556    Narrative:      MRI OF THE BRAIN WITH AND WITHOUT CONTRAST      CLINICAL HISTORY: Fall with headache and neck pain. History of  Parkinson's disease.     TECHNIQUE: MRI of the brain was obtained with sagittal T1, axial pre and  postgadolinium T1, coronal postgadolinium T1, axial FLAIR, axial T2,  axial diffusion, and axial gradient echo images     COMPARISON: Comparison is made to previous CT scan of the head dated  04/11/2022.     FINDINGS:     There is an 8 mm focus of restricted diffusion within the right corona  radiata that is compatible with an acute to subacute infarct. Given its  location, the findings  are felt to probably represent an acute lacunar  type infarct. Mild to moderate changes of chronic small vessel ischemic  phenomena are identified. Chronic lacunar disease is identified within  the right corona radiata and the lentiform nuclei bilaterally.  Additional old lacunar disease is seen within the right thalamus.     The ventricular system is enlarged as compared to the size of the sulci  near the vertex. Furthermore, there is some flattening of the  undersurface of the cerebellar vermis. The findings suggest the  possibility of a Britton's pouch cyst resulting in chronic hydrocephalus.     There are no abnormal areas of susceptibility artifact. The midline  intracranial anatomy is within normal limits. There are no abnormal  areas of contrast enhancement.       Impression:         There is a subcentimeter focus of restricted diffusion within the right  corona radiata that is compatible with an acute infarct. Given its  location and solitary nature, I suspect that this represents an acute  lacunar type infarct.     The ventricular system is relatively enlarged as compared to the size of  the sulci near the vertex. 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 The size and configuration of the ventricular  system is stable when compared to the prior study.     There are mild-to-moderate changes of chronic small vessel ischemic  phenomena. Old lacunar disease is again noted within the right corona  radiata, the lentiform nuclei, and the right thalamus.     This report was finalized on 4/13/2022 3:53 PM by Dr. Gerardo Ledesma M.D.             Medication Review:     atorvastatin, 80 mg, Oral, Nightly  carbidopa-levodopa, 2 tablet, Oral, TID With Meals  cholecalciferol, 1,000 Units, Oral, Daily  clopidogrel, 75 mg, Oral, Daily  donepezil, 10 mg, Oral,  Nightly  entacapone, 200 mg, Oral, TID With Meals  escitalopram, 5 mg, Oral, Daily  fluticasone, 2 spray, Nasal, Daily  insulin glargine, 8 Units, Subcutaneous, QAM  insulin lispro, 0-9 Units, Subcutaneous, TID With Meals  isosorbide mononitrate, 30 mg, Oral, Daily  levothyroxine, 88 mcg, Oral, Daily  losartan, 50 mg, Oral, Daily  metoprolol succinate XL, 25 mg, Oral, Daily  sodium chloride, 10 mL, Intravenous, Q12H  tamsulosin, 0.4 mg, Oral, Daily             Assessment/Plan     Principal Problem:    Fall at home  Active Problems:    CAD (coronary artery disease)    Hyperlipidemia    Essential hypertension    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    Acute right corona radiata infarct secondary to small vessel disease  2.   Parkinson's disease  3.  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   4.  Vascular dementia.   5.  Diabetes mellitus type 1   6.  Coronary artery disease   7.  Essential hypertension   8.  Nausea/vomiting/diarrhea.     Plan:    Neurology recommended resuming Plavix.  PT/OT/speech therapy to work with the patient.  Continue with Lipitor.  Echo and carotid duplex have been ordered.  Blood pressure control.  Monitor blood sugars.  Increase Lantus to 12 units.  Monitor blood pressure.  Further recommendations will depend on clinical course.    Alvin Marley DO  04/13/22  16:04 EDT

## 2022-04-13 NOTE — PROGRESS NOTES
Continued Stay Note  [facility]     Patient Name: Griffin Angeles  MRN: [id number]  Today's Date: 4/13/2022    Admit Date: 4/11/2022     Discharge Plan     Row Name 04/13/22 1513       Plan    Plan [facility], bed available tomorrow, 4/14    Patient/Family in Agreement with Plan yes    Plan Comments Per [facility] can accept pt for skilled rehab with a bed available tomorrow, 4/14. No new covid swab needed. No beds available at [facility] ([place]), [facility] ([place]), or [place] ([place]). CCP updated pt and wife at bedside who are agreeable and wife states she will transport pt at d/c. Pharmacy updated. Packet in CCP office. Leah Thompson LCSW               Discharge Codes    No documentation.               Expected Discharge Date and Time     Expected Discharge Date Expected Discharge Time    Apr 14, 2022             Paola Thompson LCSW

## 2022-04-14 ENCOUNTER — APPOINTMENT (OUTPATIENT)
Dept: CARDIOLOGY | Facility: HOSPITAL | Age: 82
End: 2022-04-14

## 2022-04-14 LAB
BH CV XLRA MEAS LEFT DIST CCA EDV: 7.4 CM/SEC
BH CV XLRA MEAS LEFT DIST CCA PSV: 68.8 CM/SEC
BH CV XLRA MEAS LEFT DIST ICA EDV: -11.3 CM/SEC
BH CV XLRA MEAS LEFT DIST ICA PSV: -83.4 CM/SEC
BH CV XLRA MEAS LEFT ICA/CCA RATIO: 1.21
BH CV XLRA MEAS LEFT MID ICA EDV: -11.7 CM/SEC
BH CV XLRA MEAS LEFT MID ICA PSV: -69 CM/SEC
BH CV XLRA MEAS LEFT PROX CCA EDV: 7.9 CM/SEC
BH CV XLRA MEAS LEFT PROX CCA PSV: 82.1 CM/SEC
BH CV XLRA MEAS LEFT PROX ECA PSV: -99.4 CM/SEC
BH CV XLRA MEAS LEFT PROX ICA EDV: -9.8 CM/SEC
BH CV XLRA MEAS LEFT PROX ICA PSV: -54.5 CM/SEC
BH CV XLRA MEAS LEFT PROX SCLA PSV: 139.5 CM/SEC
BH CV XLRA MEAS LEFT VERTEBRAL A EDV: 8.8 CM/SEC
BH CV XLRA MEAS LEFT VERTEBRAL A PSV: 52.7 CM/SEC
BH CV XLRA MEAS RIGHT DIST CCA EDV: 6.1 CM/SEC
BH CV XLRA MEAS RIGHT DIST CCA PSV: 59.3 CM/SEC
BH CV XLRA MEAS RIGHT DIST ICA EDV: -9.6 CM/SEC
BH CV XLRA MEAS RIGHT DIST ICA PSV: -69.1 CM/SEC
BH CV XLRA MEAS RIGHT ICA/CCA RATIO: 1.27
BH CV XLRA MEAS RIGHT MID ICA EDV: -13.8 CM/SEC
BH CV XLRA MEAS RIGHT MID ICA PSV: -75.2 CM/SEC
BH CV XLRA MEAS RIGHT PROX CCA EDV: 6.4 CM/SEC
BH CV XLRA MEAS RIGHT PROX CCA PSV: 67.8 CM/SEC
BH CV XLRA MEAS RIGHT PROX ECA PSV: -93.5 CM/SEC
BH CV XLRA MEAS RIGHT PROX ICA EDV: -11.2 CM/SEC
BH CV XLRA MEAS RIGHT PROX ICA PSV: -71.4 CM/SEC
BH CV XLRA MEAS RIGHT PROX SCLA PSV: 141.4 CM/SEC
BH CV XLRA MEAS RIGHT VERTEBRAL A EDV: 3.2 CM/SEC
BH CV XLRA MEAS RIGHT VERTEBRAL A PSV: 42.1 CM/SEC
CHOLEST SERPL-MCNC: 122 MG/DL (ref 0–200)
GLUCOSE BLDC GLUCOMTR-MCNC: 257 MG/DL (ref 70–130)
GLUCOSE BLDC GLUCOMTR-MCNC: 286 MG/DL (ref 70–130)
GLUCOSE BLDC GLUCOMTR-MCNC: 328 MG/DL (ref 70–130)
GLUCOSE BLDC GLUCOMTR-MCNC: 349 MG/DL (ref 70–130)
HDLC SERPL-MCNC: 55 MG/DL (ref 40–60)
LDLC SERPL CALC-MCNC: 52 MG/DL (ref 0–100)
LDLC/HDLC SERPL: 0.94 {RATIO}
LEFT ARM BP: NORMAL MMHG
MAXIMAL PREDICTED HEART RATE: 140 BPM
RIGHT ARM BP: NORMAL MMHG
STRESS TARGET HR: 119 BPM
T3FREE SERPL-MCNC: 2.02 PG/ML (ref 2–4.4)
T4 FREE SERPL-MCNC: 1.51 NG/DL (ref 0.93–1.7)
TRIGL SERPL-MCNC: 76 MG/DL (ref 0–150)
VIT B12 BLD-MCNC: 553 PG/ML (ref 211–946)
VLDLC SERPL-MCNC: 15 MG/DL (ref 5–40)

## 2022-04-14 PROCEDURE — 25010000002 ONDANSETRON PER 1 MG: Performed by: INTERNAL MEDICINE

## 2022-04-14 PROCEDURE — 84439 ASSAY OF FREE THYROXINE: CPT | Performed by: INTERNAL MEDICINE

## 2022-04-14 PROCEDURE — 93880 EXTRACRANIAL BILAT STUDY: CPT

## 2022-04-14 PROCEDURE — 99232 SBSQ HOSP IP/OBS MODERATE 35: CPT | Performed by: PSYCHIATRY & NEUROLOGY

## 2022-04-14 PROCEDURE — 82962 GLUCOSE BLOOD TEST: CPT

## 2022-04-14 PROCEDURE — 63710000001 INSULIN LISPRO (HUMAN) PER 5 UNITS: Performed by: INTERNAL MEDICINE

## 2022-04-14 PROCEDURE — 80061 LIPID PANEL: CPT | Performed by: NURSE PRACTITIONER

## 2022-04-14 PROCEDURE — 97116 GAIT TRAINING THERAPY: CPT

## 2022-04-14 PROCEDURE — 97530 THERAPEUTIC ACTIVITIES: CPT

## 2022-04-14 PROCEDURE — 82607 VITAMIN B-12: CPT | Performed by: NURSE PRACTITIONER

## 2022-04-14 PROCEDURE — 84481 FREE ASSAY (FT-3): CPT | Performed by: INTERNAL MEDICINE

## 2022-04-14 RX ADMIN — ENTACAPONE 200 MG: 200 TABLET, FILM COATED ORAL at 18:14

## 2022-04-14 RX ADMIN — LEVOTHYROXINE SODIUM 88 MCG: 0.09 TABLET ORAL at 09:31

## 2022-04-14 RX ADMIN — ISOSORBIDE MONONITRATE 30 MG: 30 TABLET ORAL at 09:31

## 2022-04-14 RX ADMIN — INSULIN GLARGINE-YFGN 10 UNITS: 100 INJECTION, SOLUTION SUBCUTANEOUS at 20:52

## 2022-04-14 RX ADMIN — INSULIN LISPRO 6 UNITS: 100 INJECTION, SOLUTION INTRAVENOUS; SUBCUTANEOUS at 09:31

## 2022-04-14 RX ADMIN — Medication 10 ML: at 20:52

## 2022-04-14 RX ADMIN — ESCITALOPRAM 5 MG: 5 TABLET, FILM COATED ORAL at 09:31

## 2022-04-14 RX ADMIN — CARBIDOPA AND LEVODOPA 2 TABLET: 25; 100 TABLET ORAL at 12:04

## 2022-04-14 RX ADMIN — ONDANSETRON 4 MG: 2 INJECTION INTRAMUSCULAR; INTRAVENOUS at 01:54

## 2022-04-14 RX ADMIN — ENTACAPONE 200 MG: 200 TABLET, FILM COATED ORAL at 09:31

## 2022-04-14 RX ADMIN — LOSARTAN POTASSIUM 50 MG: 50 TABLET, FILM COATED ORAL at 09:31

## 2022-04-14 RX ADMIN — CLOPIDOGREL 75 MG: 75 TABLET, FILM COATED ORAL at 09:31

## 2022-04-14 RX ADMIN — CARBIDOPA AND LEVODOPA 2 TABLET: 25; 100 TABLET ORAL at 18:14

## 2022-04-14 RX ADMIN — CARBIDOPA AND LEVODOPA 2 TABLET: 25; 100 TABLET ORAL at 09:31

## 2022-04-14 RX ADMIN — ENTACAPONE 200 MG: 200 TABLET, FILM COATED ORAL at 12:04

## 2022-04-14 RX ADMIN — METOPROLOL SUCCINATE 25 MG: 25 TABLET, EXTENDED RELEASE ORAL at 09:31

## 2022-04-14 RX ADMIN — DONEPEZIL HYDROCHLORIDE 10 MG: 10 TABLET, FILM COATED ORAL at 20:52

## 2022-04-14 RX ADMIN — ATORVASTATIN CALCIUM 80 MG: 80 TABLET, FILM COATED ORAL at 20:52

## 2022-04-14 RX ADMIN — INSULIN LISPRO 7 UNITS: 100 INJECTION, SOLUTION INTRAVENOUS; SUBCUTANEOUS at 18:14

## 2022-04-14 RX ADMIN — TAMSULOSIN HYDROCHLORIDE 0.4 MG: 0.4 CAPSULE ORAL at 09:31

## 2022-04-14 RX ADMIN — INSULIN LISPRO 7 UNITS: 100 INJECTION, SOLUTION INTRAVENOUS; SUBCUTANEOUS at 12:03

## 2022-04-14 RX ADMIN — Medication 1000 UNITS: at 09:31

## 2022-04-14 NOTE — PROGRESS NOTES
[facility] Acute Inpt Rehab Note      Referral received via stroke order set.  Please note this is a screening only, rehab admissions will not actively be evaluating this patient.  If felt patient is appropriate for our services once therapies begin, please call our office at [phone], to initiate a full referral.     Thank you  Stella Arzate RN  [facility] Admission Nurse  [phone]

## 2022-04-14 NOTE — PROGRESS NOTES
"DOS: 2022  NAME: Griffin Angeles   : 1940  PCP: Jeronimo Quevedo MD  Chief Complaint   Patient presents with   • Fall   • Head Injury       Chief complaint: Patient had some sundowning type effects but nothing too bad.  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    Objective:  Vital signs: /74 (BP Location: Right arm, Patient Position: Lying)   Pulse 72   Temp 98 °F (36.7 °C) (Oral)   Resp 18   Ht 167.6 cm (66\")   Wt 59 kg (130 lb)   SpO2 94%   BMI 20.98 kg/m²    Gen: NAD, vitals reviewed  MS: Seems to be good but he has short-term memory issues.  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  CN: Cranials 2 through 12: No focal deficits.  Motor: Muscles of both upper and lower extremities show leadpipe rigidity worse in the legs compared to the upper extremities.  Sensory: No sensory loss  Difficulty in ambulation has been noted.    ROS:  General: Pleasant gentleman who has lost some weight and had a recent stroke seems to be improving with the addition of Comtan.  Neurological: Worsening of the Parkinson's disease secondary to the new stroke.    Laboratory results:  Lab Results   Component Value Date    GLUCOSE 238 (H) 2022    CALCIUM 9.0 2022     2022    K 4.5 2022    CO2 23.8 2022     2022    BUN 27 (H) 2022    CREATININE 1.01 2022    EGFRIFAFRI 75 2021    EGFRIFNONA 65 2021    BCR 26.7 (H) 2022    ANIONGAP 10.2 2022     Lab Results   Component Value Date    WBC 9.32 2022    HGB 12.2 (L) 2022    HCT 37.8 2022    MCV 89.4 2022     2022     Lab Results   Component Value Date    LDL 52 2022    LDL 50 " 03/11/2022    LDL 46 07/29/2021         Lab 04/12/22  0809   HEMOGLOBIN A1C 7.00*        Review of labs: The hemoglobin A1c is 7.    Review and interpretation of imaging: Carotid Doppler studies showed the following:    Study Findings    •     Right CCA Prox:  No plaque visualized.    •     Right CCA Dist:  No plaque visualized.   •     Right ICA Prox:  Plaque present.   •     Right ECA:  Plaque present.   •     Right Vertebral:  Antegrade flow noted.       •     Left CCA Prox:  No plaque visualized.   •     Left CCA Dist:  No plaque visualized.   •     Left ICA Prox:  Plaque present.   •     Left ECA:  Plaque present.   •     Left Vertebral:  Antegrade flow noted.       Study Impression    •    Right ICA Prox:  Imaging indicates atherosclerotic plaque.    •    Right Vertebral:  Antegrade flow is present.     •    Left ICA Prox:  Imaging indicates atherosclerotic plaque.   •    Left Vertebral:  Antegrade flow is present.     The echocardiogram showed the following:    Study Description    A complete transthoracic echocardiogram with complete Doppler and color flow was performed. The study is technically good for diagnosis.         Echocardiogram Findings    Left Ventricle Calculated left ventricular EF = 56.3% Estimated left ventricular EF was in agreement with the calculated left ventricular EF.   Normal left ventricular cavity size and wall thickness noted. All left ventricular wall segments contract normally. Left ventricular diastolic function was normal.   Right Ventricle Normal right ventricular cavity size, wall thickness, systolic function and septal motion noted.   Left Atrium Normal left atrial size and volume noted.   Right Atrium Normal right atrial cavity size noted. The diameter of the inferior vena cava is 1.4 cm.   Aortic Valve The aortic valve is grossly normal in structure. No significant aortic valve regurgitation is present. No hemodynamically significant aortic valve stenosis is present.    Mitral Valve The mitral valve is grossly normal in structure. Trace to mild mitral valve regurgitation is present. No significant mitral valve stenosis is present.   Tricuspid Valve Trace to mild tricuspid valve regurgitation is present. No evidence of significant tricuspid valve stenosis is present.   Pulmonic Valve The pulmonic valve is grossly normal in structure.   Greater Vessels No dilation of the aortic root is present. No dilation of the sinuses of Valsalva is present.   Pericardium The pericardium is normal. There is no evidence of pericardial effusion         Workup to date: MRI of the brain was reviewed in details on the PACS that shows the following:        IMPRESSION:     There is a subcentimeter focus of restricted diffusion within the right  corona radiata that is compatible with an acute infarct. Given its  location and solitary nature, I suspect that this represents an acute  lacunar type infarct.     The ventricular system is relatively enlarged as compared to the size of  the sulci near the vertex. 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 The size and configuration of the ventricular  system is stable when compared to the prior study.     There are mild-to-moderate changes of chronic small vessel ischemic  phenomena. Old lacunar disease is again noted within the right corona  radiata, the lentiform nuclei, and the right thalamus.    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      Comment: Stroke work-up has been requested and has been completed.    Plan:  1.  Physical therapy and Occupational Therapy need to mobilize this patient so that he could be a good candidate for inpatient rehabilitation.  2.  Baby aspirin plus Plavix daily.  3.   Cholesterol-lowering agent daily.  4.  Continue the carbidopa/levodopa/Comtan also known as Stalevo at a dose of 50/200/200 mg 3 times daily with food.    Discussed with patient and family members and upon discharge the patient can be followed up as outpatient in the neurology clinic with his neurologist Dr. Jeff Argueta, movement disorder neurologist in [place].  Will be signing off and spent half an hour in face-to-face evaluation and management of the patient.    Neisha Casas MD

## 2022-04-14 NOTE — PLAN OF CARE
Goal Outcome Evaluation:               Discussed with RN. Patient passed screen and is tolerating diet. SLP to s/o, please re-consult as warranted- RN in agreement.

## 2022-04-14 NOTE — THERAPY TREATMENT NOTE
Patient Name: Griffin Angeles  : 1940    MRN: [id number]                              Today's Date: 2022       Admit Date: 2022    Visit Dx:     ICD-10-CM ICD-9-CM   1. Parkinson's disease (HCC)  G20 332.0   2. Frequent falls  R29.6 V15.88   3. Abrasion of nose, initial encounter  S00.31XA 910.0   4. 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)   • Type 1 diabetes mellitus with proteinuria (HCC)   • Chronic nonspecific colitis   • Multiple lacunar infarcts (HCC)   • Type 1 diabetes mellitus with complications (HCC)   • Cholesterol retinal embolus of left eye   • Parkinson's disease (HCC)   • Fall at home   • Vascular dementia (HCC)   • History of CVA (cerebrovascular accident)   • Cerebral ventriculomegaly   • Nausea vomiting and diarrhea     Past Medical History:   Diagnosis Date   • BPH (benign prostatic hypertrophy)    • CAD (coronary artery disease)    • Dementia (HCC)    • Hydrocephalus (HCC)    • Hyperlipidemia    • Hyperparathyroidism (HCC)    • Hypertension    • Hypothyroidism    • Lacunar infarction (HCC)     Bilateral basal ganglia   • Parkinsonism (HCC)    • Postherpetic neuralgia    • Shingles 2018   • Stroke (HCC)    • Type 2 diabetes mellitus (HCC)      Past Surgical History:   Procedure Laterality Date   • CATARACT EXTRACTION Bilateral 2017   • COLONOSCOPY  2017    Chronic inflammation.  Dr. Benoit.  [facility]   • CORONARY ANGIOPLASTY WITH STENT PLACEMENT     • HEMORRHOIDECTOMY     • PARATHYROIDECTOMY     • PENILE  PROSTHESIS IMPLANT  2016    Dr[facility] at [facility]   • RETINAL LASER PROCEDURE Right    • TURP / TRANSURETHRAL INCISION / DRAINAGE PROSTATE        General Information     Row Name 04/14/22 1642          Physical Therapy Time and Intention    Document Type therapy note (daily note)  -     Mode of Treatment individual therapy;physical therapy  -     Row Name 04/14/22 1642          General Information    Existing Precautions/Restrictions fall  -     Row Name 04/14/22 1642          Cognition    Orientation Status (Cognition) oriented to;person;place;situation  -     Row Name 04/14/22 1642          Safety Issues, Functional Mobility    Safety Issues Affecting Function (Mobility) ability to follow commands;at risk behavior observed;safety precautions follow-through/compliance;sequencing abilities;impulsivity;insight into deficits/self-awareness  -     Impairments Affecting Function (Mobility) balance;cognition;endurance/activity tolerance;strength;coordination;postural/trunk control  -           User Key  (r) = Recorded By, (t) = Taken By, (c) = Cosigned By    Initials Name Provider Type     Purvi Aldana PTA Physical Therapist Assistant               Mobility     Row Name 04/14/22 1645          Bed Mobility    Supine-Sit [place] (Bed Mobility) minimum assist (75% patient effort);moderate assist (50% patient effort);verbal cues;nonverbal cues (demo/gesture)  -EB     Sit-Supine [place] (Bed Mobility) minimum assist (75% patient effort);verbal cues;nonverbal cues (demo/gesture)  -     Assistive Device (Bed Mobility) bed rails;head of bed elevated  -EB     Comment, (Bed Mobility) increase time to complete. Pt leaning posteriorly cues for pt to correct posture and hand placement to hold self.  -     Row Name 04/14/22 1645          Sit-Stand Transfer    Sit-Stand [place] (Transfers) minimum assist (75% patient effort);nonverbal cues (demo/gesture);verbal cues  -     Assistive Device  (Sit-Stand Transfers) walker, 4-wheeled  -EB     Row Name 04/14/22 4212          Gait/Stairs (Locomotion)    [place] Level (Gait) minimum assist (75% patient effort);moderate assist (50% patient effort)  -EB     Assistive Device (Gait) walker, 4-wheeled  -EB     Distance in Feet (Gait) 40ft  -EB     Deviations/Abnormal Patterns (Gait) base of support, wide;festinating/shuffling;gait speed decreased;stride length decreased  -EB     Bilateral Gait Deviations forward flexed posture;heel strike decreased  -EB     Comment, (Gait/Stairs) cues to keep rollator closer and to take bigger steps. Pt at the end of ambulation, let go of walker and grabbed bed rail to try to sit on EOB. Cued pt to keep walker and finish out turns however pt not following commands. Needed ModA to safely sit pt on EOB.  -EB           User Key  (r) = Recorded By, (t) = Taken By, (c) = Cosigned By    Initials Name Provider Type    Purvi Jamison PTA Physical Therapist Assistant               Obj/Interventions    No documentation.                Goals/Plan    No documentation.                Clinical Impression     Row Name 04/14/22 1723          Plan of Care Review    Plan of Care Reviewed With patient  -EB     Progress improving  -EB     Outcome Evaluation Pt tolerated treatment with no complaints. Pt was ready to walk today. Pt required Kenn/ModA with bed mobility with pt leaning posteriorly, cues for pt to correct posture. Pt is Kenn with sit<->stand transfers. Pt increased ambulation distance, ambulating 40ft with rollator Kenn/ModA. Pt with short shuffling steps and forward flexed posture. Cues of pt to correct posture and keep walker close. Pt with decreased safety awarness when trying to sit back on EOB after ambulation. Pt reaching for bed rail when pt was not close to bed. cued pt on safety to keep walker close and finish turns before reaching for bed rail, however pt did not follow and needed ModA to safely sit pt on bed. Will  continue to progress pt as able.  -EB     Row Name 04/14/22 1648          Therapy Assessment/Plan (PT)    Therapy Frequency (PT) 5 times/wk  -EB     Row Name 04/14/22 1648          Positioning and Restraints    Pre-Treatment Position in bed  -EB     Post Treatment Position bed  -EB     In Bed supine;notified nsg;call light within reach;encouraged to call for assist;exit alarm on  -EB           User Key  (r) = Recorded By, (t) = Taken By, (c) = Cosigned By    Initials Name Provider Type    Purvi Jamison PTA Physical Therapist Assistant               Outcome Measures     Row Name 04/14/22 1651          How much help from another person do you currently need...    Turning from your back to your side while in flat bed without using bedrails? 3  -EB     Moving from lying on back to sitting on the side of a flat bed without bedrails? 2  -EB     Moving to and from a bed to a chair (including a wheelchair)? 3  -EB     Standing up from a chair using your arms (e.g., wheelchair, bedside chair)? 3  -EB     Climbing 3-5 steps with a railing? 1  -EB     To walk in hospital room? 2  -EB     AM-PAC 6 Clicks Score (PT) 14  -EB     Row Name 04/14/22 1651          Modified Paulette Scale    Modified [place]lette Scale 4 - Moderately severe disability.  Unable to walk without assistance, and unable to attend to own bodily needs without assistance.  -EB           User Key  (r) = Recorded By, (t) = Taken By, (c) = Cosigned By    Initials Name Provider Type    Purvi Jamison PTA Physical Therapist Assistant                             Physical Therapy Education                 Title: PT OT SLP Therapies (In Progress)     Topic: Physical Therapy (In Progress)     Point: Mobility training (In Progress)     Learning Progress Summary           Patient Acceptance, E,D, NR by JOSE at 4/14/2022 1651    Acceptance, E, NR by DB at 4/13/2022 1253                   Point: Home exercise program (In Progress)     Learning Progress Summary            Patient Acceptance, E,D, NR by EB at 4/14/2022 1651    Acceptance, E, NR by DB at 4/13/2022 1253                   Point: Body mechanics (In Progress)     Learning Progress Summary           Patient Acceptance, E,D, NR by EB at 4/14/2022 1651    Acceptance, E, NR by DB at 4/13/2022 1253                   Point: Precautions (In Progress)     Learning Progress Summary           Patient Acceptance, E,D, NR by EB at 4/14/2022 1651    Acceptance, E, NR by DB at 4/13/2022 1253                               User Key     Initials Effective Dates Name Provider Type Discipline    EB 06/16/21 -  Purvi Aldana, BOB Physical Therapist Assistant PT    DB 06/16/21 -  Amberly Iqbal, PT Physical Therapist PT              PT Recommendation and Plan     Plan of Care Reviewed With: patient  Progress: improving  Outcome Evaluation: Pt tolerated treatment with no complaints. Pt was ready to walk today. Pt required Kenn/ModA with bed mobility with pt leaning posteriorly, cues for pt to correct posture. Pt is Kenn with sit<->stand transfers. Pt increased ambulation distance, ambulating 40ft with rollator Kenn/ModA. Pt with short shuffling steps and forward flexed posture. Cues of pt to correct posture and keep walker close. Pt with decreased safety awarness when trying to sit back on EOB after ambulation. Pt reaching for bed rail when pt was not close to bed. cued pt on safety to keep walker close and finish turns before reaching for bed rail, however pt did not follow and needed ModA to safely sit pt on bed. Will continue to progress pt as able.     Time Calculation:    PT Charges     Row Name 04/14/22 1642             Time Calculation    Start Time 1514  -EB      Stop Time 1540  -EB      Time Calculation (min) 26 min  -EB      PT Received On 04/14/22  -EB      PT - Next Appointment 04/15/22  -              Time Calculation- PT    Total Timed Code Minutes- PT 26 minute(s)  -EB            User Key  (r) = Recorded By, (t) = Taken By,  (c) = Cosigned By    Initials Name Provider Type    Purvi Jamison PTA Physical Therapist Assistant              Therapy Charges for Today     Code Description Service Date Service Provider Modifiers Qty    27163237527 HC GAIT TRAINING EA 15 MIN 4/14/2022 Purvi Aldana PTA GP 1    [id number]  PT THERAPEUTIC ACT EA 15 MIN 4/14/2022 Purvi Aldana PTA GP 1          PT G-Codes  Outcome Measure Options: AM-PAC 6 Clicks Basic Mobility (PT)  AM-PAC 6 Clicks Score (PT): 14  AM-PAC 6 Clicks Score (OT): 14  Modified [place] Scale: 4 - Moderately severe disability.  Unable to walk without assistance, and unable to attend to own bodily needs without assistance.    Purvi Aldana PTA  4/14/2022

## 2022-04-14 NOTE — PLAN OF CARE
Goal Outcome Evaluation:  Plan of Care Reviewed With: patient        Progress: improving  Outcome Evaluation: Pt tolerated treatment with no complaints. Pt was ready to walk today. Pt required Kenn/ModA with bed mobility with pt leaning posteriorly, cues for pt to correct posture. Pt is Kenn with sit<->stand transfers. Pt increased ambulation distance, ambulating 40ft with rollator Kenn/ModA. Pt with short shuffling steps and forward flexed posture. Cues of pt to correct posture and keep walker close. Pt with decreased safety awarness when trying to sit back on EOB after ambulation. Pt reaching for bed rail when pt was not close to bed. cued pt on safety to keep walker close and finish turns before reaching for bed rail, however pt did not follow and needed ModA to safely sit pt on bed. Will continue to progress pt as able.

## 2022-04-14 NOTE — PLAN OF CARE
Goal Outcome Evaluation:      VSS. Patient SB-SR. He is more confuse and restless at night, oriented x 2-3. Wife at bedside. Patient reoriented multiple times. Denies pain. Patient slept some in between care.           Problem: Adult Inpatient Plan of Care  Goal: Plan of Care Review  Outcome: Ongoing, Progressing  Goal: Patient-Specific Goal (Individualized)  Outcome: Ongoing, Progressing  Goal: Absence of Hospital-Acquired Illness or Injury  Outcome: Ongoing, Progressing  Intervention: Identify and Manage Fall Risk  Recent Flowsheet Documentation  Taken 4/14/2022 0600 by Neida Hobbs RN  Safety Promotion/Fall Prevention: safety round/check completed  Taken 4/14/2022 0400 by Neida Hobbs RN  Safety Promotion/Fall Prevention: safety round/check completed  Taken 4/14/2022 0200 by Neida Hobbs RN  Safety Promotion/Fall Prevention: safety round/check completed  Taken 4/14/2022 0040 by Neida Hobbs RN  Safety Promotion/Fall Prevention: safety round/check completed  Taken 4/13/2022 2200 by Neida Hobbs RN  Safety Promotion/Fall Prevention: safety round/check completed  Intervention: Prevent Skin Injury  Recent Flowsheet Documentation  Taken 4/14/2022 0600 by Neida Hobbs RN  Body Position: position changed independently  Taken 4/14/2022 0400 by Neida Hobbs RN  Body Position: position changed independently  Taken 4/14/2022 0200 by Neida Hobbs RN  Body Position: position changed independently  Taken 4/14/2022 0040 by Neida Hobbs RN  Body Position: position changed independently  Taken 4/13/2022 2200 by Neida Hobbs RN  Body Position: position changed independently  Taken 4/13/2022 2000 by Neida Hobbs RN  Body Position: position changed independently  Intervention: Prevent and Manage VTE (Venous Thromboembolism) Risk  Recent Flowsheet Documentation  Taken 4/13/2022 2000 by Neida Hobbs RN  VTE Prevention/Management:   bilateral   sequential compression devices on  Range of  Motion: ROM (range of motion) performed  Goal: Optimal Comfort and Wellbeing  Outcome: Ongoing, Progressing  Goal: Readiness for Transition of Care  Outcome: Ongoing, Progressing     Problem: Fall Injury Risk  Goal: Absence of Fall and Fall-Related Injury  Outcome: Ongoing, Progressing  Intervention: Identify and Manage Contributors  Recent Flowsheet Documentation  Taken 4/13/2022 2000 by Neida Hobbs RN  Medication Review/Management: medications reviewed  Intervention: Promote Injury-Free Environment  Recent Flowsheet Documentation  Taken 4/14/2022 0600 by Neida Hobbs RN  Safety Promotion/Fall Prevention: safety round/check completed  Taken 4/14/2022 0400 by Neida Hobbs RN  Safety Promotion/Fall Prevention: safety round/check completed  Taken 4/14/2022 0200 by Neida Hobbs RN  Safety Promotion/Fall Prevention: safety round/check completed  Taken 4/14/2022 0040 by Neida Hobbs RN  Safety Promotion/Fall Prevention: safety round/check completed  Taken 4/13/2022 2200 by Neida Hobbs RN  Safety Promotion/Fall Prevention: safety round/check completed     Problem: Skin Injury Risk Increased  Goal: Skin Health and Integrity  Outcome: Ongoing, Progressing  Intervention: Optimize Skin Protection  Recent Flowsheet Documentation  Taken 4/14/2022 0600 by Neida Hobbs RN  Head of Bed (HOB) Positioning: HOB at 20 degrees  Taken 4/14/2022 0400 by Neida Hobbs RN  Head of Bed (HOB) Positioning: HOB at 20 degrees  Taken 4/14/2022 0200 by Neida Hobbs RN  Head of Bed (HOB) Positioning: HOB at 20 degrees  Taken 4/14/2022 0040 by Neida Hobbs RN  Head of Bed (HOB) Positioning: HOB at 20-30 degrees  Taken 4/13/2022 2200 by Neida Hobbs RN  Head of Bed (HOB) Positioning: HOB at 20-30 degrees  Taken 4/13/2022 2000 by Neida Hobbs RN  Head of Bed (HOB) Positioning: HOB at 30-45 degrees

## 2022-04-14 NOTE — CASE MANAGEMENT/SOCIAL WORK
Continued Stay Note  [facility]     Patient Name: Griffin Angeles  MRN: [id number]  Today's Date: 4/14/2022    Admit Date: 4/11/2022     Discharge Plan     Row Name 04/14/22 1636       Plan    Plan [facility] skilled bed available tomorrow, 4/15    Patient/Family in Agreement with Plan yes    Plan Comments Per Rossana/Estiven, the patient will have a bed available tomorrow, 4/15 at [facility]. The patient's wife can provide transport. CCP will follow. Packet in CCP office. Kit BLOOM RN CCP               Discharge Codes    No documentation.               Expected Discharge Date and Time     Expected Discharge Date Expected Discharge Time    Apr 14, 2022             Kit Hatfield RN

## 2022-04-14 NOTE — PROGRESS NOTES
[facility]    PROGRESS NOTE    Name:  Griffin Angeles   Age:  81 y.o.  Sex:  male  :  1940  MRN:  [id number]   Visit Number:  [id number]  Admission Date:  2022  Date Of Service:  22  Primary Care Physician:  Jeronimo Quevedo MD     LOS: 1 day :  Patient Care Team:  Jeronimo Quevedo MD as PCP - General (Internal Medicine)  Moisés Kong MD as Consulting Physician (Cardiology)  Hay Love MD as Consulting Physician (Urology)  Rich Benoit MD as Consulting Physician (Ophthalmology)  RAULITO Acevedo MD as Consulting Physician (Ophthalmology)  Epifanio Silver MD as Consulting Physician (Endocrinology)  Fan Benoit MD as Surgeon (General Surgery)  Vikash Crocker DPM as Consulting Physician (Podiatry)  Will Ortega MD as Consulting Physician (Cardiology):    History taken from:     patient chart family    Chief Complaint:      Weakness    Subjective     Interval History:     Patient seen and examined again today.    Patient admitted for falls at home with weakness.  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  He has seen multiple neurologists at [facility] and follows with one in [place].  He has been tried on Sinemet to see if that would help, the wife states that this is not helping.    Did consult neurology for opinion.  Wife states that he was diagnosed with NPH, however he was felt not to be a candidate for shunt.     Neurology was consulted and felt that he did have Parkinson's disease and added entacapone 20 mg 3 times daily.  MRI was ordered which showed an acute right corona radiata infarct.  Patient was on Plavix prior to admission, this has been restarted.  He continues with PT, OT, speech therapy.    Carotid duplex was done which was negative for significant carotid stenosis.  Echocardiogram with bubble study has been ordered, this is pending.  Patient denies any chest pressure, shortness of  breath, nausea, vomiting, pain.  Spoke to his wife at length.    He is noted to have elevated blood sugars, will adjust his Lantus to 10 units twice daily.  His LDL is only 52.    Review of Systems:     Unable to obtain due to dementia.    Objective     Vital Signs:    Temp:  [97.6 °F (36.4 °C)-98.1 °F (36.7 °C)] 98 °F (36.7 °C)  Heart Rate:  [57-72] 72  Resp:  [16-18] 18  BP: (126-159)/(61-74) 137/74    Physical Exam:    General: Alert and oriented x2, mild distress.  Heart: Regular rate and rhythm without murmur rub or thrill.  Lungs: Clear to auscultation bilaterally without use of accessory muscles respiration.  Abdomen: Soft/nontender/nondistended.  No HSM noted.  MSK: 4/5 strength in upper/lower extremities bilaterally.  Weaker in his legs.  Neuro: Cranial nerves II through XII grossly intact.  No neuro deficits are noted.  Psych: Short-term and long-term memory are mildly impaired.       Results Review:      I reviewed the patient's new clinical results.  I reviewed the patient's new imaging results and agree with the interpretation.  I reviewed the patient's other test results and agree with the interpretation    Labs:    Lab Results (last 24 hours)     Procedure Component Value Units Date/Time    T3, Free [[id number]]  (Normal) Collected: 04/14/22 0706    Specimen: Blood Updated: 04/14/22 1153     T3, Free 2.02 pg/mL     Narrative:      Results may be falsely increased if patient taking Biotin.      POC Glucose Once [[id number]]  (Abnormal) Collected: 04/14/22 1150    Specimen: Blood Updated: 04/14/22 1152     Glucose 349 mg/dL      Comment: Meter: [id number] : [id number] Hatchettwanda Dooley ELISHA       T4, Free [[id number]]  (Normal) Collected: 04/14/22 0706    Specimen: Blood Updated: 04/14/22 1026     Free T4 1.51 ng/dL     Narrative:      Results may be falsely increased if patient taking Biotin.      POC Glucose Once [[id number]]  (Abnormal) Collected: 04/14/22 0917    Specimen: Blood Updated: 04/14/22 0919      Glucose 257 mg/dL      Comment: Meter: [id number] : [id number] Sheng Torres RN       Vitamin B12 [[id number]]  (Normal) Collected: 04/14/22 0706    Specimen: Blood Updated: 04/14/22 0824     Vitamin B-12 553 pg/mL     Narrative:      Results may be falsely increased if patient taking Biotin.      Lipid Panel [[id number]] Collected: 04/14/22 0706    Specimen: Blood Updated: 04/14/22 0813     Total Cholesterol 122 mg/dL      Triglycerides 76 mg/dL      HDL Cholesterol 55 mg/dL      LDL Cholesterol  52 mg/dL      VLDL Cholesterol 15 mg/dL      LDL/HDL Ratio 0.94    Narrative:      Cholesterol Reference Ranges  (U.S. Department of Health and Human Services ATP III Classifications)    Desirable          <200 mg/dL  Borderline High    200-239 mg/dL  High Risk          >240 mg/dL      Triglyceride Reference Ranges  (U.S. Department of Health and Human Services ATP III Classifications)    Normal           <150 mg/dL  Borderline High  150-199 mg/dL  High             200-499 mg/dL  Very High        >500 mg/dL    HDL Reference Ranges  (U.S. Department of Health and Human Services ATP III Classifications)    Low     <40 mg/dl (major risk factor for CHD)  High    >60 mg/dl ('negative' risk factor for CHD)        LDL Reference Ranges  (U.S. Department of Health and Human Services ATP III Classifications)    Optimal          <100 mg/dL  Near Optimal     100-129 mg/dL  Borderline High  130-159 mg/dL  High             160-189 mg/dL  Very High        >189 mg/dL    POC Glucose Once [[id number]]  (Abnormal) Collected: 04/13/22 2153    Specimen: Blood Updated: 04/13/22 2155     Glucose 288 mg/dL      Comment: Meter: [id number] : [id number] Elías Vivas  PONCHO       TSH [[id number]]  (Abnormal) Collected: 04/13/22 0632    Specimen: Blood from Arm, Left Updated: 04/13/22 1937     TSH 0.258 uIU/mL     POC Glucose Once [[id number]]  (Abnormal) Collected: 04/13/22 1700    Specimen: Blood Updated: 04/13/22 1702     Glucose 355 mg/dL       Comment: Meter: [id number] : [id number] Rich Augustine ELISHA              Radiology:    Imaging Results (Last 24 Hours)     ** No results found for the last 24 hours. **          Medication Review:     atorvastatin, 80 mg, Oral, Nightly  carbidopa-levodopa, 2 tablet, Oral, TID With Meals  cholecalciferol, 1,000 Units, Oral, Daily  clopidogrel, 75 mg, Oral, Daily  donepezil, 10 mg, Oral, Nightly  entacapone, 200 mg, Oral, TID With Meals  escitalopram, 5 mg, Oral, Daily  fluticasone, 2 spray, Nasal, Daily  insulin glargine, 10 Units, Subcutaneous, Q12H  insulin lispro, 0-9 Units, Subcutaneous, TID With Meals  isosorbide mononitrate, 30 mg, Oral, Daily  levothyroxine, 88 mcg, Oral, Daily  losartan, 50 mg, Oral, Daily  metoprolol succinate XL, 25 mg, Oral, Daily  sodium chloride, 10 mL, Intravenous, Q12H  tamsulosin, 0.4 mg, Oral, Daily             Assessment/Plan     Principal Problem:    Fall at home  Active Problems:    CAD (coronary artery disease)    Hyperlipidemia    Essential hypertension    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    Acute right corona radiata infarct secondary to small vessel disease  2.   Parkinson's disease  3.  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   4.  Vascular dementia.   5.  Diabetes mellitus type 1   6.  Coronary artery disease   7.  Essential hypertension   8.  Nausea/vomiting/diarrhea.     Plan:    Continue Plavix.  PT/OT/speech therapy to work with the patient.  Continue with Lipitor.  Echo pending.  Blood pressure control.  Monitor blood sugars.  Increase Lantus to 10 units twice daily.  Monitor blood pressure.  Anticipate transfer to rehab tomorrow.  Further recommendations will depend on clinical course.    Alvin Marley DO  04/14/22  16:27 EDT

## 2022-04-15 ENCOUNTER — APPOINTMENT (OUTPATIENT)
Dept: CARDIOLOGY | Facility: HOSPITAL | Age: 82
End: 2022-04-15

## 2022-04-15 VITALS
OXYGEN SATURATION: 96 % | BODY MASS INDEX: 20.89 KG/M2 | WEIGHT: 130 LBS | RESPIRATION RATE: 18 BRPM | DIASTOLIC BLOOD PRESSURE: 63 MMHG | TEMPERATURE: 97.7 F | SYSTOLIC BLOOD PRESSURE: 142 MMHG | HEIGHT: 66 IN | HEART RATE: 61 BPM

## 2022-04-15 LAB
ALBUMIN SERPL-MCNC: 3.1 G/DL (ref 3.5–5.2)
ANION GAP SERPL CALCULATED.3IONS-SCNC: 10.3 MMOL/L (ref 5–15)
BH CV ECHO MEAS - ACS: 1.8 CM
BH CV ECHO MEAS - AO MAX PG: 16.1 MMHG
BH CV ECHO MEAS - AO MEAN PG: 10.1 MMHG
BH CV ECHO MEAS - AO ROOT DIAM: 2.7 CM
BH CV ECHO MEAS - AO V2 MAX: 200.9 CM/SEC
BH CV ECHO MEAS - AO V2 VTI: 52.3 CM
BH CV ECHO MEAS - AVA(I,D): 1.14 CM2
BH CV ECHO MEAS - EDV(CUBED): 31 ML
BH CV ECHO MEAS - EDV(MOD-SP2): 31 ML
BH CV ECHO MEAS - EDV(MOD-SP4): 35 ML
BH CV ECHO MEAS - EF(MOD-BP): 61.9 %
BH CV ECHO MEAS - EF(MOD-SP2): 64.5 %
BH CV ECHO MEAS - EF(MOD-SP4): 60 %
BH CV ECHO MEAS - ESV(CUBED): 11.2 ML
BH CV ECHO MEAS - ESV(MOD-SP2): 11 ML
BH CV ECHO MEAS - ESV(MOD-SP4): 14 ML
BH CV ECHO MEAS - FS: 28.7 %
BH CV ECHO MEAS - IVS/LVPW: 1.07 CM
BH CV ECHO MEAS - IVSD: 0.92 CM
BH CV ECHO MEAS - LA DIMENSION: 3 CM
BH CV ECHO MEAS - LAT PEAK E' VEL: 6.2 CM/SEC
BH CV ECHO MEAS - LV DIASTOLIC VOL/BSA (35-75): 21 CM2
BH CV ECHO MEAS - LV MASS(C)D: 73.8 GRAMS
BH CV ECHO MEAS - LV MAX PG: 3.8 MMHG
BH CV ECHO MEAS - LV MEAN PG: 1.95 MMHG
BH CV ECHO MEAS - LV SYSTOLIC VOL/BSA (12-30): 8.4 CM2
BH CV ECHO MEAS - LV V1 MAX: 97.7 CM/SEC
BH CV ECHO MEAS - LV V1 VTI: 22 CM
BH CV ECHO MEAS - LVIDD: 3.1 CM
BH CV ECHO MEAS - LVIDS: 2.24 CM
BH CV ECHO MEAS - LVOT AREA: 2.7 CM2
BH CV ECHO MEAS - LVOT DIAM: 1.86 CM
BH CV ECHO MEAS - LVPWD: 0.86 CM
BH CV ECHO MEAS - MED PEAK E' VEL: 6.5 CM/SEC
BH CV ECHO MEAS - MV A MAX VEL: 109.3 CM/SEC
BH CV ECHO MEAS - MV DEC SLOPE: 282 CM/SEC2
BH CV ECHO MEAS - MV DEC TIME: 259 MSEC
BH CV ECHO MEAS - MV E MAX VEL: 67.7 CM/SEC
BH CV ECHO MEAS - MV E/A: 0.62
BH CV ECHO MEAS - MV MAX PG: 6.7 MMHG
BH CV ECHO MEAS - MV MEAN PG: 2.11 MMHG
BH CV ECHO MEAS - MV V2 VTI: 40.2 CM
BH CV ECHO MEAS - MVA(VTI): 1.48 CM2
BH CV ECHO MEAS - PA ACC TIME: 0.13 SEC
BH CV ECHO MEAS - PA PR(ACCEL): 18.4 MMHG
BH CV ECHO MEAS - PA V2 MAX: 157.5 CM/SEC
BH CV ECHO MEAS - PI END-D VEL: 111.3 CM/SEC
BH CV ECHO MEAS - PULM A REVS DUR: 0.22 SEC
BH CV ECHO MEAS - PULM A REVS VEL: 38.6 CM/SEC
BH CV ECHO MEAS - PULM DIAS VEL: 44.1 CM/SEC
BH CV ECHO MEAS - PULM SYS VEL: 89.5 CM/SEC
BH CV ECHO MEAS - RV MAX PG: 1.72 MMHG
BH CV ECHO MEAS - RV V1 MAX: 65.6 CM/SEC
BH CV ECHO MEAS - RV V1 VTI: 16.5 CM
BH CV ECHO MEAS - RVOT DIAM: 1.69 CM
BH CV ECHO MEAS - SI(MOD-SP2): 12 ML/M2
BH CV ECHO MEAS - SI(MOD-SP4): 12.6 ML/M2
BH CV ECHO MEAS - SV(LVOT): 59.5 ML
BH CV ECHO MEAS - SV(MOD-SP2): 20 ML
BH CV ECHO MEAS - SV(MOD-SP4): 21 ML
BH CV ECHO MEAS - SV(RVOT): 36.9 ML
BH CV ECHO MEAS - TAPSE (>1.6): 1.98 CM
BH CV ECHO MEASUREMENTS AVERAGE E/E' RATIO: 10.66
BH CV XLRA - RV BASE: 2.7 CM
BH CV XLRA - RV LENGTH: 6.7 CM
BH CV XLRA - RV MID: 2.25 CM
BH CV XLRA - TDI S': 14.5 CM/SEC
BUN SERPL-MCNC: 20 MG/DL (ref 8–23)
BUN/CREAT SERPL: 20 (ref 7–25)
CALCIUM SPEC-SCNC: 9.8 MG/DL (ref 8.6–10.5)
CHLORIDE SERPL-SCNC: 101 MMOL/L (ref 98–107)
CO2 SERPL-SCNC: 27.7 MMOL/L (ref 22–29)
CREAT SERPL-MCNC: 1 MG/DL (ref 0.76–1.27)
DEPRECATED RDW RBC AUTO: 40.6 FL (ref 37–54)
EGFRCR SERPLBLD CKD-EPI 2021: 76.1 ML/MIN/1.73
ERYTHROCYTE [DISTWIDTH] IN BLOOD BY AUTOMATED COUNT: 13.2 % (ref 12.3–15.4)
GLUCOSE BLDC GLUCOMTR-MCNC: 200 MG/DL (ref 70–130)
GLUCOSE BLDC GLUCOMTR-MCNC: 382 MG/DL (ref 70–130)
GLUCOSE BLDC GLUCOMTR-MCNC: 464 MG/DL (ref 70–130)
GLUCOSE SERPL-MCNC: 225 MG/DL (ref 65–99)
HCT VFR BLD AUTO: 40.1 % (ref 37.5–51)
HGB BLD-MCNC: 13.9 G/DL (ref 13–17.7)
MAXIMAL PREDICTED HEART RATE: 140 BPM
MCH RBC QN AUTO: 30 PG (ref 26.6–33)
MCHC RBC AUTO-ENTMCNC: 34.7 G/DL (ref 31.5–35.7)
MCV RBC AUTO: 86.6 FL (ref 79–97)
PHOSPHATE SERPL-MCNC: 2.6 MG/DL (ref 2.5–4.5)
PLATELET # BLD AUTO: 194 10*3/MM3 (ref 140–450)
PMV BLD AUTO: 13.1 FL (ref 6–12)
POTASSIUM SERPL-SCNC: 4.5 MMOL/L (ref 3.5–5.2)
RBC # BLD AUTO: 4.63 10*6/MM3 (ref 4.14–5.8)
SINUS: 2.46 CM
SODIUM SERPL-SCNC: 139 MMOL/L (ref 136–145)
STJ: 2.7 CM
STRESS TARGET HR: 119 BPM
WBC NRBC COR # BLD: 9.97 10*3/MM3 (ref 3.4–10.8)

## 2022-04-15 PROCEDURE — 82962 GLUCOSE BLOOD TEST: CPT

## 2022-04-15 PROCEDURE — 85027 COMPLETE CBC AUTOMATED: CPT | Performed by: INTERNAL MEDICINE

## 2022-04-15 PROCEDURE — 93306 TTE W/DOPPLER COMPLETE: CPT | Performed by: INTERNAL MEDICINE

## 2022-04-15 PROCEDURE — 63710000001 INSULIN LISPRO (HUMAN) PER 5 UNITS: Performed by: INTERNAL MEDICINE

## 2022-04-15 PROCEDURE — 80069 RENAL FUNCTION PANEL: CPT | Performed by: INTERNAL MEDICINE

## 2022-04-15 PROCEDURE — 93306 TTE W/DOPPLER COMPLETE: CPT

## 2022-04-15 RX ORDER — ATORVASTATIN CALCIUM 80 MG/1
80 TABLET, FILM COATED ORAL NIGHTLY
Start: 2022-04-15 | End: 2022-11-10 | Stop reason: DRUGHIGH

## 2022-04-15 RX ORDER — ESCITALOPRAM OXALATE 5 MG/1
5 TABLET ORAL DAILY
Start: 2022-04-16 | End: 2022-11-10 | Stop reason: DRUGHIGH

## 2022-04-15 RX ORDER — ENTACAPONE 200 MG/1
200 TABLET ORAL
Start: 2022-04-15 | End: 2022-11-10 | Stop reason: HOSPADM

## 2022-04-15 RX ADMIN — ISOSORBIDE MONONITRATE 30 MG: 30 TABLET ORAL at 08:14

## 2022-04-15 RX ADMIN — INSULIN LISPRO 4 UNITS: 100 INJECTION, SOLUTION INTRAVENOUS; SUBCUTANEOUS at 08:09

## 2022-04-15 RX ADMIN — INSULIN GLARGINE-YFGN 10 UNITS: 100 INJECTION, SOLUTION SUBCUTANEOUS at 08:11

## 2022-04-15 RX ADMIN — ESCITALOPRAM 5 MG: 5 TABLET, FILM COATED ORAL at 08:10

## 2022-04-15 RX ADMIN — LEVOTHYROXINE SODIUM 88 MCG: 0.09 TABLET ORAL at 08:10

## 2022-04-15 RX ADMIN — ENTACAPONE 200 MG: 200 TABLET, FILM COATED ORAL at 13:07

## 2022-04-15 RX ADMIN — INSULIN LISPRO 8 UNITS: 100 INJECTION, SOLUTION INTRAVENOUS; SUBCUTANEOUS at 13:07

## 2022-04-15 RX ADMIN — LOSARTAN POTASSIUM 50 MG: 50 TABLET, FILM COATED ORAL at 08:10

## 2022-04-15 RX ADMIN — CARBIDOPA AND LEVODOPA 2 TABLET: 25; 100 TABLET ORAL at 13:07

## 2022-04-15 RX ADMIN — TAMSULOSIN HYDROCHLORIDE 0.4 MG: 0.4 CAPSULE ORAL at 08:10

## 2022-04-15 RX ADMIN — Medication 1000 UNITS: at 08:10

## 2022-04-15 RX ADMIN — METOPROLOL SUCCINATE 25 MG: 25 TABLET, EXTENDED RELEASE ORAL at 08:10

## 2022-04-15 RX ADMIN — ENTACAPONE 200 MG: 200 TABLET, FILM COATED ORAL at 08:10

## 2022-04-15 RX ADMIN — CARBIDOPA AND LEVODOPA 2 TABLET: 25; 100 TABLET ORAL at 08:10

## 2022-04-15 RX ADMIN — CLOPIDOGREL 75 MG: 75 TABLET, FILM COATED ORAL at 08:10

## 2022-04-15 NOTE — PLAN OF CARE
Goal Outcome Evaluation:      VSS. Patient oriented x3, calm and cooperative. NIH of 2. Patient slept well in between care. Wife at bedside. Plan for ECHO and discharge today.           Problem: Adult Inpatient Plan of Care  Goal: Plan of Care Review  Outcome: Ongoing, Progressing  Goal: Patient-Specific Goal (Individualized)  Outcome: Ongoing, Progressing  Goal: Absence of Hospital-Acquired Illness or Injury  Outcome: Ongoing, Progressing  Intervention: Identify and Manage Fall Risk  Recent Flowsheet Documentation  Taken 4/15/2022 0400 by Neida Hobbs RN  Safety Promotion/Fall Prevention: safety round/check completed  Taken 4/15/2022 0000 by Neida Hobbs RN  Safety Promotion/Fall Prevention: safety round/check completed  Taken 4/14/2022 2235 by Neida Hobbs RN  Safety Promotion/Fall Prevention: safety round/check completed  Taken 4/14/2022 2045 by Neida Hobbs RN  Safety Promotion/Fall Prevention:   activity supervised   assistive device/personal items within reach   fall prevention program maintained   nonskid shoes/slippers when out of bed   safety round/check completed  Intervention: Prevent Skin Injury  Recent Flowsheet Documentation  Taken 4/15/2022 0000 by Neida Hobbs RN  Body Position: position changed independently  Taken 4/14/2022 2235 by Neida Hobbs RN  Body Position: position changed independently  Taken 4/14/2022 2045 by Neida Hobbs RN  Body Position: position changed independently  Intervention: Prevent and Manage VTE (Venous Thromboembolism) Risk  Recent Flowsheet Documentation  Taken 4/14/2022 2045 by Neida Hobbs RN  Activity Management: (back to bed)   up in chair   ambulated in room  VTE Prevention/Management:   bilateral   sequential compression devices on  Goal: Optimal Comfort and Wellbeing  Outcome: Ongoing, Progressing  Goal: Readiness for Transition of Care  Outcome: Ongoing, Progressing     Problem: Fall Injury Risk  Goal: Absence of Fall and Fall-Related  Injury  Outcome: Ongoing, Progressing  Intervention: Identify and Manage Contributors  Recent Flowsheet Documentation  Taken 4/14/2022 2045 by Neida Hobbs RN  Medication Review/Management: medications reviewed  Intervention: Promote Injury-Free Environment  Recent Flowsheet Documentation  Taken 4/15/2022 0400 by Neida Hobbs RN  Safety Promotion/Fall Prevention: safety round/check completed  Taken 4/15/2022 0000 by Neida Hobbs RN  Safety Promotion/Fall Prevention: safety round/check completed  Taken 4/14/2022 2235 by Neida Hobbs RN  Safety Promotion/Fall Prevention: safety round/check completed  Taken 4/14/2022 2045 by Neida Hobbs RN  Safety Promotion/Fall Prevention:   activity supervised   assistive device/personal items within reach   fall prevention program maintained   nonskid shoes/slippers when out of bed   safety round/check completed     Problem: Skin Injury Risk Increased  Goal: Skin Health and Integrity  Outcome: Ongoing, Progressing  Intervention: Optimize Skin Protection  Recent Flowsheet Documentation  Taken 4/15/2022 0000 by Neida Hobbs RN  Head of Bed (HOB) Positioning: HOB at 20-30 degrees  Taken 4/14/2022 2235 by Neida Hobbs RN  Head of Bed (HOB) Positioning: HOB at 20-30 degrees  Taken 4/14/2022 2045 by Neida Hobbs RN  Pressure Reduction Techniques: frequent weight shift encouraged  Head of Bed (HOB) Positioning: HOB at 20-30 degrees  Pressure Reduction Devices: alternating pressure pump (ADD)     Problem: Adjustment to Illness (Stroke, Ischemic/Transient Ischemic Attack)  Goal: Optimal Coping  Outcome: Ongoing, Progressing

## 2022-04-15 NOTE — DISCHARGE SUMMARY
[facility]  [facility]      Name:  Griffin Angeles   Age:  81 y.o.  Sex:  male  :  1940  MRN:  [id number]   Visit Number:  [id number]  Primary Care Physician:  Jeronimo Quevedo MD  Date of Discharge:  4/15/2022  Admission Date:  2022      Discharge Diagnosis:     1.    Acute right corona radiata infarct secondary to small vessel disease  2.   Parkinson's disease  3.  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   4.  Vascular dementia.   5.  Diabetes mellitus type 1   6.  Coronary artery disease   7.  Essential hypertension   8.  Nausea/vomiting/diarrhea.   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         Presenting Problem/History of Present Illness:    Parkinson's disease (HCC) [G20]  Frequent falls [R29.6]  Minor head injury, initial encounter [S09.90XA]  Abrasion of nose, initial encounter [S00.31XA]         Hospital Course:    Patient admitted for falls at home with weakness.  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  He has seen multiple neurologists at [facility] and follows with one in [place].  He has been tried on Sinemet to see if that would help, the wife states that this is not helping.    Did consult neurology for opinion.  Wife states that he was diagnosed with NPH, however he was felt not to be a candidate for shunt.      Neurology was consulted and felt that he did have Parkinson's  disease and added entacapone 20 mg 3 times daily.  MRI was ordered which showed an acute right corona radiata infarct.  Patient was on Plavix prior to admission, this has been restarted.  He continues with PT, OT, speech therapy.  Echocardiogram with bubble study is pending.     Carotid duplex was done which was negative for significant carotid stenosis.   Patient denies any chest pressure, shortness of breath, nausea, vomiting, pain.  Spoke to his wife at length.    PT, OT, speech therapy were consulted to work with the patient.  Speech therapy saw the patient and did not feel there should be any adjustment in his diet.  TSH was checked which was low, however free T4 and free T3 were normal.     He is noted to have elevated blood sugars, will adjust his Lantus to 10 units twice daily.  This may be needing further adjustment, would monitor at the nursing home and adjust out as needed. His LDL is only 52.  Continue Lipitor.    Patient should follow-up with his PCP once released from the nursing home.  Patient should also follow-up with Dr Kervin Argueta, his neurologist at , within the next couple weeks.  Return to the hospital if any worsening stroke symptoms.  Continue with current medication regimen, monitor blood sugars closely.  Patient will be discharged today after echocardiogram is read.    Procedures Performed:           Consults:     Consults     Date and Time Order Name Status Description    4/12/2022  2:45 PM Inpatient Neurology Consult General Completed     4/11/2022  2:11 PM [facility] (on-call MD unless specified) Details            Pertinent Test Results:         Results from last 7 days   Lab Units 04/13/22  0632 04/12/22  0809 04/11/22  1247   WBC 10*3/mm3 9.32 10.13 10.52   HEMOGLOBIN g/dL 12.2* 12.5* 12.7*   HEMATOCRIT % 37.8 38.4 36.9*   PLATELETS 10*3/mm3 181 181 159   MCV fL 89.4 89.1 86.6   SODIUM mmol/L 136 137 138   POTASSIUM mmol/L 4.5 4.6 4.4   CHLORIDE mmol/L 102 104 103   CO2 mmol/L 23.8 23.7  "28.0   BUN mg/dL 27* 21 28*   CREATININE mg/dL 1.01 1.17 1.13   GLUCOSE mg/dL 238* 158* 221*   CALCIUM mg/dL 9.0 9.2 9.0          Results from last 7 days   Lab Units 04/13/22  0632 04/12/22  0809 04/11/22  1247   WBC 10*3/mm3 9.32 10.13 10.52     Results from last 7 days   Lab Units 04/13/22  0632 04/11/22  1247   BILIRUBIN mg/dL 0.6 0.4   ALK PHOS U/L 220* 279*   ALT (SGPT) U/L 7 8   AST (SGOT) U/L 7 9   PROTIME Seconds  --  13.0   INR   --  0.99   APTT seconds  --  27.4     Results from last 7 days   Lab Units 04/14/22  0706   CHOLESTEROL mg/dL 122   TRIGLYCERIDES mg/dL 76   HDL CHOL mg/dL 55     Results from last 7 days   Lab Units 04/13/22  0632 04/12/22  0809   TSH uIU/mL 0.258*  --    HEMOGLOBIN A1C %  --  7.00*     Brief Urine Lab Results  (Last result in the past 365 days)      Color   Clarity   Blood   Leuk Est   Nitrite   Protein   CREAT   Urine HCG        08/05/21 1458             64.5                 Results for orders placed during the hospital encounter of 04/11/22    Duplex Carotid Ultrasound CAR    Interpretation Summary  · Right internal carotid artery plaque without significant stenosis.  · Left internal carotid artery plaque without significant stenosis.      Results for orders placed during the hospital encounter of 11/04/21    Adult Transthoracic Echo Complete W/ Cont if Necessary Per Protocol    Interpretation Summary  · Calculated left ventricular EF = 56.3% Estimated left ventricular EF was in agreement with the calculated left ventricular EF.  · Left ventricular diastolic function was normal.  · There is no evidence of pericardial effusion. .        Condition on Discharge:      Stable    Vital Signs:    /77 (BP Location: Right arm, Patient Position: Lying)   Pulse 51   Temp 97.7 °F (36.5 °C) (Oral)   Resp 18   Ht 167.6 cm (66\")   Wt 59 kg (130 lb)   SpO2 96%   BMI 20.98 kg/m²     Physical Exam:    General: Alert and oriented x2, mild distress.  Heart: Regular rate and rhythm " without murmur rub or thrill.  Lungs: Clear to auscultation bilaterally without use of accessory muscles respiration.  Abdomen: Soft/nontender/nondistended.  No HSM noted.  MSK: 4/5 strength in upper/lower extremities bilaterally.  Weaker in his legs.  Neuro: Cranial nerves II through XII grossly intact.  No neuro deficits are noted.  Psych: Short-term and long-term memory are mildly impaired.    Discharge Disposition:    Skilled Nursing Facility (DC - External)    Discharge Medication:       Discharge Medications      New Medications      Instructions Start Date   entacapone 200 MG tablet  Commonly known as: COMTAN   200 mg, Oral, 3 Times Daily With Meals      insulin glargine 100 UNIT/ML injection  Commonly known as: LANTUS, SEMGLEE   10 Units, Subcutaneous, Every 12 Hours Scheduled         Changes to Medications      Instructions Start Date   atorvastatin 80 MG tablet  Commonly known as: LIPITOR  What changed:   · medication strength  · how much to take  · when to take this   80 mg, Oral, Nightly      carbidopa-levodopa  MG per tablet  Commonly known as: SINEMET  What changed:   · how to take this  · when to take this  · additional instructions   2 tablets, Oral, 3 Times Daily With Meals      escitalopram 5 MG tablet  Commonly known as: LEXAPRO  What changed: how much to take   5 mg, Oral, Daily   Start Date: April 16, 2022        Continue These Medications      Instructions Start Date   Accu-Chek Softclix Lancets lancets   Dx code E10.8 testing bs 3 x day      BD Pen Needle Aydee 2nd Gen 32G X 4 MM misc  Generic drug: Insulin Pen Needle   USE AS DIRECTED 4 TIMES A  DAY      clopidogrel 75 MG tablet  Commonly known as: PLAVIX   TAKE 1 TABLET DAILY      donepezil 10 MG tablet  Commonly known as: ARICEPT   TAKE 1 TABLET BY MOUTH ONCE DAILY AT NIGHT      fluticasone 50 MCG/ACT nasal spray  Commonly known as: FLONASE   2 sprays, Nasal, Daily PRN      FreeStyle Dione 2 Sensor misc   1 each, Does not apply, Every  "14 Days, Use every 14 days.  Dx: E 1      Insulin Syringe-Needle U-100 31G X 15/64\" 0.5 ML misc  Commonly known as: ReliOn Insulin Syringe   Using 3 syringes daily      isosorbide mononitrate 30 MG 24 hr tablet  Commonly known as: IMDUR   TAKE 1 TABLET DAILY      levothyroxine 88 MCG tablet  Commonly known as: SYNTHROID, LEVOTHROID   TAKE 1 TABLET DAILY      losartan 50 MG tablet  Commonly known as: COZAAR   50 mg, Oral, Daily      metoprolol succinate XL 25 MG 24 hr tablet  Commonly known as: TOPROL-XL   25 mg, Daily, 1/2 tablet daily      NovoLOG FlexPen 100 UNIT/ML solution pen-injector sc pen  Generic drug: insulin aspart   INJECT 5-6 UNITS SUBCUTANEOUSLY AT BREAKFAST, 5-6 UNITS AT LUNCH AND 5-6 UNITS AT DINNER      tamsulosin 0.4 MG capsule 24 hr capsule  Commonly known as: FLOMAX   2 capsules, Daily      Vitamin D3 25 MCG (1000 UT) capsule   1 capsule, Oral, Daily         Stop These Medications    brompheniramine-pseudoephedrine-DM 30-2-10 MG/5ML syrup     FreeStyle Precision Bulmaro Test test strip  Generic drug: glucose blood     glucagon 1 MG injection  Commonly known as: GLUCAGEN     Tresiba FlexTouch 100 UNIT/ML solution pen-injector injection  Generic drug: insulin degludec            Discharge Diet:     Diet Instructions     Diet: Consistent Carbohydrate, Cardiac      Discharge Diet:  Consistent Carbohydrate  Cardiac             Activity at Discharge:     Activity Instructions     Activity as Tolerated            Follow-up Appointments:    Future Appointments   Date Time Provider Department Center   8/26/2022 11:00 AM Epifanio Silver MD [facility]   11/10/2022  1:15 PM Moisés Kong MD [facility]     Additional Instructions for the Follow-ups that You Need to Schedule     Discharge Follow-up with PCP   As directed       Currently Documented PCP:    Jeronimo Quevedo MD    PCP Phone Number:    [phone]     Follow Up Details: 1 week         Discharge Follow-up with Specified " Provider: Dr Kervin Argueta; 2 Weeks   As directed      To: Dr Kervin Argueta    Follow Up: 2 Weeks               Test Results Pending at Discharge:    Pending Labs     Order Current Status    CBC (No Diff) In process    Renal Function Panel In process        Discharge took 35 minutes including review of chart, charting, medication reconciliation, discussion with case management and nursing, discussion with patient and family, arrangement of nursing home, arrangement of follow-up, with greater than 50% of time spent in coordination of care.     Alvin Marley DO  04/15/22  08:52 EDT

## 2022-04-18 NOTE — CASE MANAGEMENT/SOCIAL WORK
Case Management Discharge Note      Final Note: pt discharged to [facility] skilled bed via family transport    Provided Post Acute Provider List?: Yes  Delivered To: Patient, Support Person  Support Person: spouse- Zohra  Method of Delivery: In person    Selected Continued Care - Discharged on 4/15/2022 Admission date: 4/11/2022 - Discharge disposition: Skilled Nursing Facility (DC - External)    Destination Coordination complete.    Service Provider Selected Services Address Phone Fax Patient Preferred    [facility]  Skilled Nursing [address], [facility] [address] [phone] --          Durable Medical Equipment    No services have been selected for the patient.              Dialysis/Infusion    No services have been selected for the patient.              Home Medical Care    No services have been selected for the patient.              Therapy    No services have been selected for the patient.              Community Resources    No services have been selected for the patient.              Community & DME    No services have been selected for the patient.                  Transportation Services  Private: Car    Final Discharge Disposition Code: 03 - skilled nursing facility (SNF)

## 2022-05-24 ENCOUNTER — TRANSCRIBE ORDERS (OUTPATIENT)
Dept: HOME HEALTH SERVICES | Facility: HOME HEALTHCARE | Age: 82
End: 2022-05-24

## 2022-05-24 ENCOUNTER — HOME HEALTH ADMISSION (OUTPATIENT)
Dept: HOME HEALTH SERVICES | Facility: HOME HEALTHCARE | Age: 82
End: 2022-05-24

## 2022-05-24 DIAGNOSIS — I69.30 UNSPECIFIED SEQUELAE OF CEREBRAL INFARCTION: Primary | ICD-10-CM

## 2022-05-25 ENCOUNTER — HOME CARE VISIT (OUTPATIENT)
Dept: HOME HEALTH SERVICES | Facility: HOME HEALTHCARE | Age: 82
End: 2022-05-25

## 2022-05-25 PROCEDURE — G0299 HHS/HOSPICE OF RN EA 15 MIN: HCPCS

## 2022-05-26 ENCOUNTER — HOME CARE VISIT (OUTPATIENT)
Dept: HOME HEALTH SERVICES | Facility: HOME HEALTHCARE | Age: 82
End: 2022-05-26

## 2022-05-26 PROCEDURE — G0153 HHCP-SVS OF S/L PATH,EA 15MN: HCPCS

## 2022-05-27 ENCOUNTER — HOME CARE VISIT (OUTPATIENT)
Dept: HOME HEALTH SERVICES | Facility: HOME HEALTHCARE | Age: 82
End: 2022-05-27

## 2022-05-27 VITALS
SYSTOLIC BLOOD PRESSURE: 110 MMHG | TEMPERATURE: 97.6 F | OXYGEN SATURATION: 99 % | RESPIRATION RATE: 18 BRPM | HEART RATE: 63 BPM | DIASTOLIC BLOOD PRESSURE: 62 MMHG

## 2022-05-27 PROCEDURE — G0152 HHCP-SERV OF OT,EA 15 MIN: HCPCS

## 2022-05-27 NOTE — CASE COMMUNICATION
PATIENT WAS SITTING AT KITCHEN TABLE HAING LUNCH WHEN SN ARRIVED. PATIENT LIVES WITH HIS WIFE WHO IS HIS PRIMART CG. PATIENT HAS DEMENTIA , PARKINSONS, HYDROCEPHALUS, AND DIABETES. PATIENT HAS WORD SALAD AND IS CONFUSED MAJORITY OF THE TIME ACCORDING TO HIS WIFE. HE WAS NOT ABLE TO ANSWER MOST QUESTIONS APPROPRIATELY. DM IS MANAGED WITH LONG AND SHORT ACTING INSULIN AND HE HAS THE ARTHUR 2. PATIENT USES WALKER AND STAND BY ASSIST TO AMBU LATE. HIS WIFE MANAGES HIS MEDS BY FILLING PILL PLANNER WEEKLY.

## 2022-05-27 NOTE — HOME HEALTH
PATIENT WAS SITTING AT KITCHEN TABLE HAING LUNCH WHEN SN ARRIVED. PATIENT LIVES WITH HIS WIFE WHO IS HIS PRIMART CG. PATIENT HAS DEMENTIA , PARKINSONS, HYDROCEPHALUS, AND DIABETES. PATIENT HAS WORD SALAD AND IS CONFUSED MAJORITY OF THE TIME ACCORDING TO HIS WIFE. HE WAS NOT ABLE TO ANSWER MOST QUESTIONS APPROPRIATELY. DM IS MANAGED WITH LONG AND SHORT ACTING INSULIN AND HE HAS THE ARTHUR 2. PATIENT USES WALKER AND STAND BY ASSIST TO AMBULATE. HIS WIFE MANAGES HIS MEDS BY FILLING PILL PLANNER WEEKLY.

## 2022-05-27 NOTE — HOME HEALTH
Subjective:  Pt. sitting on sofa in robe.  Spouse reports that pt. is having rough start, and she couldn't get him to walk this morning.  Pt. pleasant and cooperative this date.  No report of falls or medication changes.    Reason for referral:  OT home health referral given d/t recent hospital and rehab stay following fall at home.  Pt. demonstraing generalized weakness, impaired ADL and mobility.    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   Vascular dementia.   Diabetes mellitus type 1   Coronary artery disease   Essential hypertension   Nausea/vomiting/diarrhea    Pt. demonstrating impaired transfers and mobility to assist with ADL tasks.  Pt. has forward posture and requires tactile and verbal cues to correct posture with sitting and standing.  Pt. required min-mod assist with sit to stand transfers, min assist wtih functional mobility with rollator walker.  He demonstrates impaired UE and core strength - OT provided ed/training with ther. exercise - pt. participated well.    Plan for next visit:  OT to further address ADL, caregiver ed/training, BUE & core strengthening, functional transfers & mobility r/t ADL performance.    MEDICAL NECESSITY FOR ONGOING SKILLED THERAPY: Patient requires skilled occupational therapy for remediation of deficits to improve activities of daily living, home safety, falls prevention, monitor vital signs, including pulse oximetry, hand/ upper extremity function/range of motion/strength, therapeutic exercise, safety in home, and improve endurance and fatigue management with self care, and functional mobility with durable medical equipment as necessary.

## 2022-05-28 ENCOUNTER — HOME CARE VISIT (OUTPATIENT)
Dept: HOME HEALTH SERVICES | Facility: HOME HEALTHCARE | Age: 82
End: 2022-05-28

## 2022-05-28 VITALS
OXYGEN SATURATION: 97 % | RESPIRATION RATE: 18 BRPM | TEMPERATURE: 98 F | DIASTOLIC BLOOD PRESSURE: 75 MMHG | HEART RATE: 79 BPM | SYSTOLIC BLOOD PRESSURE: 140 MMHG

## 2022-05-28 NOTE — CASE COMMUNICATION
Attempted to reach patient several times by phone on 5/27 and 5/28/22, no answer. Patient was not seen for PT evaluation on this date.

## 2022-05-30 VITALS
TEMPERATURE: 97.1 F | HEART RATE: 64 BPM | DIASTOLIC BLOOD PRESSURE: 68 MMHG | OXYGEN SATURATION: 98 % | SYSTOLIC BLOOD PRESSURE: 130 MMHG

## 2022-05-30 NOTE — HOME HEALTH
REASON FOR REFERRAL: Skilled Speech Therapy referral post hospitalization and rehab after a fall, to evaluate and establish a plan of care.   PRIMARY DIAGNOSIS Dysphagia; Cognitive impairment  SECONDARY DIAGNOSIS Parkinson's disease   PERTINENT HISTORY: Parkinson's disease, Fall at home, Vascular dementia, History of CVA (cerebrovascular accident), Cerebral ventriculomegaly, Nausea vomiting and diarrhea     RESPIRATORY STATUS  No issues reported  HEARING Hearing aids; Not wearing  VISION  Glasses; Macular degeneration  HANDEDNESS Right                                                              ORIENTATION   Oriented to date of birth, current month and year                       PLAN FOR NEXT VISIT  MEDICAL NECESSITY FOR ONGOING SKILLED THERAPY: Dysphagia therapy to assess/evaluate least restrictive oral intake, minimize aspiration/risk of and restore oropharyngeal function in order to enhance patient's quality of life by improving ability to safely consume least restrictive diet.  Cognitive linguistic therapy to restore cognition to increase safety, communication and function in home. Skilled Speech Therapy interventions/education are necessary due to decreased swallowing function and increased risk of aspiration, decline in cognition and increased dependence on caregiver.  SPECIFIC INTERVENTIONS AND GOALS TO ADDRESS ON NEXT VISIT:  Education- Dysphagia   Cognitive linguistic tasks  FREQUENCY AND DURATION: 2w4  REASSESSMENT DUE DATE: 6/26/22

## 2022-05-31 ENCOUNTER — HOME CARE VISIT (OUTPATIENT)
Dept: HOME HEALTH SERVICES | Facility: HOME HEALTHCARE | Age: 82
End: 2022-05-31

## 2022-05-31 VITALS
SYSTOLIC BLOOD PRESSURE: 140 MMHG | HEART RATE: 73 BPM | DIASTOLIC BLOOD PRESSURE: 80 MMHG | RESPIRATION RATE: 18 BRPM | OXYGEN SATURATION: 99 % | TEMPERATURE: 97.2 F

## 2022-05-31 PROCEDURE — G0151 HHCP-SERV OF PT,EA 15 MIN: HCPCS

## 2022-05-31 PROCEDURE — G0152 HHCP-SERV OF OT,EA 15 MIN: HCPCS

## 2022-05-31 PROCEDURE — G0153 HHCP-SVS OF S/L PATH,EA 15MN: HCPCS

## 2022-05-31 NOTE — HOME HEALTH
Patient is an 80yo male discharged from [facility] in mid May for 4 days and referred to physical therapy for Parkinson's Disease. Patient transferred from [facility] to [facility] where he spent 6 weeks. Patient lives with spouse in 1 story home with spouse. According to spouse, patient was just diagnosed with Parkinson's about 7 months ago. Per spouse, patient has had numerous falls, and more unsteady in the morning. Patient using rollator, hospital bed, grab bars in master bathroom with walk in shower, bedside commode in other bathroom, but needs grab bars in other bathroom. Patient enters/exits through garage having to negotiate 3-4 steps with 1 handrail. Per spouse, patient had numerous TIA's in past, and does present with left side weakness so it is unclear if he has had a CVA in the past. Patient has short shuffled gait, difficulty advancing left lower extremity despite cuing to advance. Skilled Physical Therapy is medically necessary to establish home exercise program after recent functional decline from hospitalization. Patient education necessary for appropriate progression of home exercise program, and gait training to reduce reliance on assist device. Without skilled physical therapy, patient at risk for falls, increased burden of care.    PLAN FOR NEXT VISIT:  --Continue therapeutic exercises to include balance training  --Continue gait training to improve safety and consistency with foot clearance  --Continue transfer training

## 2022-06-01 VITALS
HEART RATE: 73 BPM | SYSTOLIC BLOOD PRESSURE: 140 MMHG | TEMPERATURE: 97.2 F | DIASTOLIC BLOOD PRESSURE: 80 MMHG | OXYGEN SATURATION: 99 % | RESPIRATION RATE: 18 BRPM

## 2022-06-01 VITALS
SYSTOLIC BLOOD PRESSURE: 140 MMHG | TEMPERATURE: 97.2 F | DIASTOLIC BLOOD PRESSURE: 80 MMHG | RESPIRATION RATE: 18 BRPM | HEART RATE: 73 BPM | OXYGEN SATURATION: 99 %

## 2022-06-01 NOTE — HOME HEALTH
PLAN FOR NEXT VISIT   MEDICAL NECESSITY FOR ONGOING SKILLED THERAPY: Dysphagia therapy to assess/evaluate least restrictive oral intake, minimize aspiration/risk of and restore oropharyngeal function in order to enhance patient's quality of life by improving ability to safely consume least restrictive diet. Cognitive linguistic therapy to restore cognition to increase safety, communication and function in home. Skilled Speech Therapy interventions/education are necessary due to decreased swallowing function and increased risk of aspiration, decline in cognition and increased dependence on caregiver.   SPECIFIC INTERVENTIONS AND GOALS TO ADDRESS ON NEXT VISIT:   Education- Dysphagia   Cognitive linguistic tasks   FREQUENCY AND DURATION: 2w4   REASSESSMENT DUE DATE: 6/26/22

## 2022-06-01 NOTE — HOME HEALTH
"Subjective:  Pt. sitting on sofa with wife present.  Pt's. spouse states that pt. had a terrible morning and continues to have difficulty getting him to walk.  Pt. states that he is \"OK\".  Spouse reports that they are going to spend the night in [place] with their son who is going to take them to Neurologist appt. tomorrow.      No falls or medication changes reported.    Pt. pleasant and cooperative and tolerated all activities well this date with improved upright posture.    Plan for next visit: OT to further address ADL, caregiver ed/training, BUE & core strengthening, functional transfers & mobility r/t ADL performance.   MEDICAL NECESSITY FOR ONGOING SKILLED THERAPY: Patient requires skilled occupational therapy for remediation of deficits to improve activities of daily living, home safety, falls prevention, monitor vital signs, including pulse oximetry, hand/ upper extremity function/range of motion/strength, therapeutic exercise, safety in home, and improve endurance and fatigue management with self care, and functional mobility with durable medical equipment as necessary."

## 2022-06-02 ENCOUNTER — HOME CARE VISIT (OUTPATIENT)
Dept: HOME HEALTH SERVICES | Facility: HOME HEALTHCARE | Age: 82
End: 2022-06-02

## 2022-06-02 VITALS
DIASTOLIC BLOOD PRESSURE: 58 MMHG | OXYGEN SATURATION: 100 % | SYSTOLIC BLOOD PRESSURE: 110 MMHG | HEART RATE: 73 BPM | TEMPERATURE: 96.5 F

## 2022-06-02 PROCEDURE — G0153 HHCP-SVS OF S/L PATH,EA 15MN: HCPCS

## 2022-06-03 ENCOUNTER — HOME CARE VISIT (OUTPATIENT)
Dept: HOME HEALTH SERVICES | Facility: HOME HEALTHCARE | Age: 82
End: 2022-06-03

## 2022-06-03 VITALS
TEMPERATURE: 97.7 F | SYSTOLIC BLOOD PRESSURE: 132 MMHG | DIASTOLIC BLOOD PRESSURE: 70 MMHG | HEART RATE: 79 BPM | OXYGEN SATURATION: 100 %

## 2022-06-03 PROCEDURE — G0157 HHC PT ASSISTANT EA 15: HCPCS

## 2022-06-03 PROCEDURE — G0152 HHCP-SERV OF OT,EA 15 MIN: HCPCS

## 2022-06-03 NOTE — HOME HEALTH
Subjective: I am ok today    Falls- none  Medication changes- none  Wound- left forearm bruising and small skin tear ( covered with bandaid)       Assessment: Family present for education to be able to assist for carryover. He has some memory concerns and talks about things that happened years ago and has to be re-directed and occasionally forgets some of his instructions and has to be cued on accuracy, safety and awareness. He requires CGA/SBA at all times with mobility and transfers. Therapist sent Email to follow up to see why no nursing visits are scheduled. Discussion with spouse about folder use for Vitals and Calender.    Plan for next visit/Communication  gait training  review and progress HEP  safety   balance  transfers  follow up with nursing

## 2022-06-03 NOTE — HOME HEALTH
Patient went to see neurologist in [place]. Wife reported that patient was having increased difficulty walking today. Patient had to make a trip to [place] for a doctor's appointment and was still recovering from the long ride/appointment/being away from home.       PLAN FOR NEXT VISIT   MEDICAL NECESSITY FOR ONGOING SKILLED THERAPY: Dysphagia therapy to assess/evaluate least restrictive oral intake, minimize aspiration/risk of and restore oropharyngeal function in order to enhance patient's quality of life by improving ability to safely consume least restrictive diet. Cognitive linguistic therapy to restore cognition to increase safety, communication and function in home. Skilled Speech Therapy in terventions/education are necessary due to decreased swallowing function and increased risk of aspiration, decline in cognition and increased dependence on caregiver.   SPECIFIC INTERVENTIONS AND GOALS TO ADDRESS ON NEXT VISIT:   Education- Dysphagia   Cognitive linguistic tasks   FREQUENCY AND DURATION: 2w4   REASSESSMENT DUE DATE: 6/26/22

## 2022-06-05 VITALS
DIASTOLIC BLOOD PRESSURE: 70 MMHG | HEART RATE: 79 BPM | OXYGEN SATURATION: 100 % | SYSTOLIC BLOOD PRESSURE: 132 MMHG | RESPIRATION RATE: 18 BRPM | TEMPERATURE: 97.7 F

## 2022-06-06 ENCOUNTER — HOME CARE VISIT (OUTPATIENT)
Dept: HOME HEALTH SERVICES | Facility: HOME HEALTHCARE | Age: 82
End: 2022-06-06

## 2022-06-06 VITALS
OXYGEN SATURATION: 99 % | SYSTOLIC BLOOD PRESSURE: 130 MMHG | HEART RATE: 74 BPM | TEMPERATURE: 96.9 F | DIASTOLIC BLOOD PRESSURE: 72 MMHG

## 2022-06-06 PROCEDURE — G0157 HHC PT ASSISTANT EA 15: HCPCS

## 2022-06-06 NOTE — HOME HEALTH
"Subjective: Pt. and spouse report they are doing \"OK\".  Patient was seen by neurologist yesterday - he has new orders for HHA services to assist with bathing needs - OT did enter those visits.    No falls reported.     Pt. pleasant and cooperative and tolerated all activities well this date with improved upright posture, activity tolerance, mobiity with AD, and functional transfers r/t to ADL - specifically toileting and shower transfer addressed.     Plan for next visit: OT to further address ADL, caregiver ed/training, BUE & core strengthening, functional transfers & mobility r/t ADL performance.     MEDICAL NECESSITY FOR ONGOING SKILLED THERAPY: Patient requires skilled occupational therapy for remediation of deficits to improve activities of daily living, home safety, falls prevention, monitor vital signs, including pulse oximetry, hand/ upper extremity function/range of motion/strength, therapeutic exercise, safety in home, and improve endurance and fatigue management with self care, and functional mobility with durable medical equipment as necessary"

## 2022-06-06 NOTE — HOME HEALTH
"Subjective:  per spouse- patient is having a worse day today and the doctor didn't do much at the PCP    Falls- none   Medication changes- none   Wound- left forearm bruising and small skin tear ( covered with bandaid)     Assessment: Family present for education to be able to assist for carryover. He has some memory concerns /cognitive deficits and talks about things that happened years ago and has to be re-directed and occasionally forgets some of his instructions and has to be cued on accuracy, safety and awareness. I gave spouse a pamplet for in home care to assist with patient and housework.He has more \"freezing\" today with mobility and took increased time to complete distance.    Plan for next visit/Communication   gait training   review and progress HEP   safety   balance   transfers   follow up with nursing ( email sent to nurse,  and Manager)"

## 2022-06-07 ENCOUNTER — HOME CARE VISIT (OUTPATIENT)
Dept: HOME HEALTH SERVICES | Facility: HOME HEALTHCARE | Age: 82
End: 2022-06-07

## 2022-06-07 VITALS
DIASTOLIC BLOOD PRESSURE: 70 MMHG | TEMPERATURE: 97.1 F | OXYGEN SATURATION: 96 % | RESPIRATION RATE: 18 BRPM | SYSTOLIC BLOOD PRESSURE: 136 MMHG | HEART RATE: 67 BPM

## 2022-06-07 VITALS
RESPIRATION RATE: 18 BRPM | SYSTOLIC BLOOD PRESSURE: 136 MMHG | TEMPERATURE: 97.1 F | HEART RATE: 67 BPM | OXYGEN SATURATION: 96 % | DIASTOLIC BLOOD PRESSURE: 70 MMHG

## 2022-06-07 PROCEDURE — G0153 HHCP-SVS OF S/L PATH,EA 15MN: HCPCS

## 2022-06-07 PROCEDURE — G0152 HHCP-SERV OF OT,EA 15 MIN: HCPCS

## 2022-06-08 ENCOUNTER — HOME CARE VISIT (OUTPATIENT)
Dept: HOME HEALTH SERVICES | Facility: HOME HEALTHCARE | Age: 82
End: 2022-06-08

## 2022-06-08 VITALS
HEART RATE: 61 BPM | DIASTOLIC BLOOD PRESSURE: 64 MMHG | TEMPERATURE: 97.3 F | OXYGEN SATURATION: 99 % | SYSTOLIC BLOOD PRESSURE: 122 MMHG

## 2022-06-08 VITALS
DIASTOLIC BLOOD PRESSURE: 68 MMHG | HEART RATE: 72 BPM | SYSTOLIC BLOOD PRESSURE: 108 MMHG | RESPIRATION RATE: 18 BRPM | TEMPERATURE: 97.2 F

## 2022-06-08 PROCEDURE — G0157 HHC PT ASSISTANT EA 15: HCPCS

## 2022-06-08 PROCEDURE — G0156 HHCP-SVS OF AIDE,EA 15 MIN: HCPCS

## 2022-06-08 NOTE — HOME HEALTH
Subjective: per spouse- He is doing better today, The bath aide was great    Falls- none   Medication changes- none   Wound- left forearm bruising and small skin tear ( covered with bandaid) . Therapist removed bandaid per spouse request and assessed with no concerns and Pictures taken and loaded in EPIC and left open to air. spouse able to cover as needed    Assessment: Patient continues with cognitive deficits and lacks safety awareness. spouse is able to provide 24/7. She declines need for nurse at this time and will let us know if she changes her mind.  Spouse able to demonstrate taking spouse up and down steps to exit the home and appears safe.     Plan for next visit/Communication   gait training   review HEP   safety   balance   transfers

## 2022-06-08 NOTE — HOME HEALTH
"Subjective: Pt. and spouse report they are doing \"OK\". Spouse reports that pt. couldn't walk when she got him up this morning - she utilized the w/c.  Pt's. spouse does have a sore back and is overwhelmed with providing 24 hr. care d/t the physical needs.  OT did discuss getting benefits from [facility] as pt. is a  and he [facility] in the [facility]. OT did contact pt's. PCP to obtain a v/o for MSW to assist with providing resources for long term care at home.  No falls reported.   Pt. pleasant and cooperative and tolerated all activities well this date with improved upright posture, activity tolerance, mobiity with AD, and functional transfers r/t to ADL - he continues to have LLE weakness and does drag LLE when fatigued - requires constant cues to  left foot to take bigger steps. OT also set him up at his piano to play at end of visit - pt. seems to really enjoy playing and OT provided education with how great the piano is for coordination and cognition.   Plan for next visit: OT to further address ADL, caregiver ed/training, BUE & core strengthening, functional transfers & mobility r/t ADL performance.   MEDICAL NECESSITY FOR ONGOING SKILLED THERAPY: Patient requires skilled occupational therapy for remediation of deficits to improve activities of daily living, home safety, falls prevention, monitor vital signs, including pulse oximetry, hand/ upper extremity function/range of motion/strength, therapeutic exercise, safety in home, and improve endurance and fatigue management with self care, and functional mobility with durable medical equipment as necessary"

## 2022-06-09 ENCOUNTER — HOME CARE VISIT (OUTPATIENT)
Dept: HOME HEALTH SERVICES | Facility: HOME HEALTHCARE | Age: 82
End: 2022-06-09

## 2022-06-09 PROCEDURE — G0153 HHCP-SVS OF S/L PATH,EA 15MN: HCPCS

## 2022-06-09 PROCEDURE — G0152 HHCP-SERV OF OT,EA 15 MIN: HCPCS

## 2022-06-09 NOTE — HOME HEALTH
Patient was finishing a session with [facility] [place]. SLP and OT discussed his progress and continued needs.    PLAN FOR NEXT VISIT   MEDICAL NECESSITY FOR ONGOING SKILLED THERAPY: Dysphagia therapy to assess/evaluate least restrictive oral intake, minimize aspiration/risk of and restore oropharyngeal function in order to enhance patient's quality of life by improving ability to safely consume least restrictive diet. Cognitive linguistic therapy to restore cognition to increase safety, communication and function in home. Skilled Speech Therapy in terventions/education are necessary due to decreased swallowing function and increased risk of aspiration, decline in cognition and increased dependence on caregiver.   SPECIFIC INTERVENTIONS AND GOALS TO ADDRESS ON NEXT VISIT:   Education- Dysphagia   Cognitive linguistic tasks   FREQUENCY AND DURATION: 2w4   REASSESSMENT DUE DATE: 6/26/22

## 2022-06-10 ENCOUNTER — HOME CARE VISIT (OUTPATIENT)
Dept: HOME HEALTH SERVICES | Facility: HOME HEALTHCARE | Age: 82
End: 2022-06-10

## 2022-06-11 VITALS
HEART RATE: 66 BPM | DIASTOLIC BLOOD PRESSURE: 64 MMHG | RESPIRATION RATE: 18 BRPM | TEMPERATURE: 97.1 F | SYSTOLIC BLOOD PRESSURE: 136 MMHG | OXYGEN SATURATION: 99 %

## 2022-06-11 NOTE — HOME HEALTH
"Subjective: Pt. and spouse report they are doing \"OK\". Spouse reports that patient had difficulty walking this morning and she has had to utilize w/c.  Paid caregiver services are recommended and spouse has been given resources.  OT did obtain order from pt's. PCP for MSW to assist with [facility] benefits and other resources in home.   No falls or medication changes reported.   Pt. pleasant and cooperative and tolerated all activities well this date.   Plan for next visit: OT to further address ADL, caregiver ed/training, BUE & core strengthening, functional transfers & mobility r/t ADL performance.   MEDICAL NECESSITY FOR ONGOING SKILLED THERAPY: Patient requires skilled occupational therapy for remediation of deficits to improve activities of daily living, home safety, falls prevention, monitor vital signs, including pulse oximetry, hand/ upper extremity function/range of motion/strength, therapeutic exercise, safety in home, and improve endurance and fatigue management with self care, and functional mobility with durable medical equipment as necessary"

## 2022-06-12 VITALS
SYSTOLIC BLOOD PRESSURE: 136 MMHG | HEART RATE: 67 BPM | DIASTOLIC BLOOD PRESSURE: 70 MMHG | RESPIRATION RATE: 18 BRPM | TEMPERATURE: 97.1 F | OXYGEN SATURATION: 96 %

## 2022-06-12 NOTE — HOME HEALTH
Patient had just finished a session with [facility] [place]. He was sitting at the piano. His wife was present for the session.     PLAN FOR NEXT VISIT   MEDICAL NECESSITY FOR ONGOING SKILLED THERAPY: Dysphagia therapy to assess/evaluate least restrictive oral intake, minimize aspiration/risk of and restore oropharyngeal function in order to enhance patient's quality of life by improving ability to safely consume least restrictive diet. Cognitive linguistic therapy to restore cognition to increase safety, communication and function in home. Skilled Speech Therapy in terventions/education are necessary due to decreased swallowing function and increased risk of aspiration, decline in cognition and increased dependence on caregiver.   SPECIFIC INTERVENTIONS AND GOALS TO ADDRESS ON NEXT VISIT:   Education- Dysphagia and strategies to continue daily  Cognitive linguistic tasks   FREQUENCY AND DURATION: 2w4   REASSESSMENT DUE DATE: 6/26/22

## 2022-06-13 ENCOUNTER — HOME CARE VISIT (OUTPATIENT)
Dept: HOME HEALTH SERVICES | Facility: HOME HEALTHCARE | Age: 82
End: 2022-06-13

## 2022-06-13 VITALS
HEART RATE: 75 BPM | TEMPERATURE: 97.3 F | DIASTOLIC BLOOD PRESSURE: 66 MMHG | OXYGEN SATURATION: 98 % | SYSTOLIC BLOOD PRESSURE: 122 MMHG

## 2022-06-13 PROCEDURE — G0157 HHC PT ASSISTANT EA 15: HCPCS

## 2022-06-13 NOTE — CASE COMMUNICATION
DR. Jeronimo Quevedo MD    REGARDING [facility]  DATE OF BIRTH 1940     HOME HEALTH AGENCIES ARE REQUIRED BY FEDERAL STATE AND ACCREDITATION REGULATIONS TO NOTIFY THE PHYSICIAN OF THE FOLLOWING PATIENT RELATED INCIDENT. PLEASE CALL [phone] IF YOU HAVE ANY QUESTIONS.     PATIENT HAD AN UNOBSERVED FALL ON 6- WITHOUT SIGNIFICANT INJURY.      THANK YOU,  Jayne Hammond [facility]

## 2022-06-13 NOTE — HOME HEALTH
Subjective: per spouse- He didn't  his feet earlier and he fell    Falls- 6-13-22 at ~11:00 am, spouse assisted up. doctor notified via CC  Medication changes- none   Wound- left and right forearm bruising and small skin tear ( covered with bandaid) .     Assessment: Spouse is having a hard time finding someone to come and provide care for patient and most are telling her they don't come to [facility]. Therapist emailed  to call and follow up. Spouse had another fall this am and spouse was able to assist him up. He has cognitive deficts and requires 24/7 care and has had multiple falls. Therapist has provided education to use belt at all times and to use w/c as needed and spouse appears knowledgeable.. He appears to know most of his HEP and is not appropriate for standing HEP at this time for high fall risk.    Plan for next visit/Communication   Follow up with  appointment  gait training   review HEP   safety   transfers

## 2022-06-13 NOTE — CASE COMMUNICATION
Spouse is asking for  to assist with in home care. She has called the ones on her list I gave her and they are saying they don't come to [facility].    He has a doctor appt at 1:45 on 6-14-22

## 2022-06-14 ENCOUNTER — HOME CARE VISIT (OUTPATIENT)
Dept: HOME HEALTH SERVICES | Facility: HOME HEALTHCARE | Age: 82
End: 2022-06-14

## 2022-06-14 VITALS
OXYGEN SATURATION: 100 % | SYSTOLIC BLOOD PRESSURE: 114 MMHG | RESPIRATION RATE: 18 BRPM | TEMPERATURE: 96.9 F | HEART RATE: 63 BPM | DIASTOLIC BLOOD PRESSURE: 60 MMHG

## 2022-06-14 PROCEDURE — G0153 HHCP-SVS OF S/L PATH,EA 15MN: HCPCS

## 2022-06-15 ENCOUNTER — HOME CARE VISIT (OUTPATIENT)
Dept: HOME HEALTH SERVICES | Facility: HOME HEALTHCARE | Age: 82
End: 2022-06-15

## 2022-06-15 VITALS
HEART RATE: 60 BPM | RESPIRATION RATE: 18 BRPM | DIASTOLIC BLOOD PRESSURE: 66 MMHG | TEMPERATURE: 97.1 F | SYSTOLIC BLOOD PRESSURE: 114 MMHG | OXYGEN SATURATION: 99 %

## 2022-06-15 VITALS
RESPIRATION RATE: 18 BRPM | TEMPERATURE: 96.8 F | HEART RATE: 64 BPM | SYSTOLIC BLOOD PRESSURE: 120 MMHG | DIASTOLIC BLOOD PRESSURE: 64 MMHG

## 2022-06-15 VITALS
DIASTOLIC BLOOD PRESSURE: 66 MMHG | HEART RATE: 58 BPM | OXYGEN SATURATION: 99 % | TEMPERATURE: 97.1 F | SYSTOLIC BLOOD PRESSURE: 114 MMHG

## 2022-06-15 PROCEDURE — G0152 HHCP-SERV OF OT,EA 15 MIN: HCPCS

## 2022-06-15 PROCEDURE — G0151 HHCP-SERV OF PT,EA 15 MIN: HCPCS

## 2022-06-15 PROCEDURE — G0156 HHCP-SVS OF AIDE,EA 15 MIN: HCPCS

## 2022-06-15 PROCEDURE — G0155 HHCP-SVS OF CSW,EA 15 MIN: HCPCS

## 2022-06-15 NOTE — HOME HEALTH
Patient was finishing in the bathroom when SLP arrived. Wife assisted him into the livingroom.   Wife reported that she had presented some of the worksheets left in the home, but patient was not attentive (fell asleep).     PLAN FOR NEXT VISIT   MEDICAL NECESSITY FOR ONGOING SKILLED THERAPY: Dysphagia therapy to assess/evaluate least restrictive oral intake, minimize aspiration/risk of and restore oropharyngeal function in order to enhance patient's quality of life by improving ability to safely consume least restrictive diet. Cognitive linguistic therapy to restore cognition to increase safety, communication and function in home. Skilled Speech Therapy in terventions/education are necessary due to decreased swallowing function and increased risk of aspiration, decline in cognition and increased dependence on caregiver.   SPECIFIC INTERVENTIONS AND GOALS TO ADDRESS ON NEXT VISIT:   Education- Dysphagia and strategies to continue daily   Cognitive linguistic tasks   FREQUENCY AND DURATION: 2w4   REASSESSMENT DUE DATE: 6/26/22

## 2022-06-16 ENCOUNTER — HOME CARE VISIT (OUTPATIENT)
Dept: HOME HEALTH SERVICES | Facility: HOME HEALTHCARE | Age: 82
End: 2022-06-16

## 2022-06-16 VITALS
HEART RATE: 67 BPM | OXYGEN SATURATION: 99 % | SYSTOLIC BLOOD PRESSURE: 110 MMHG | DIASTOLIC BLOOD PRESSURE: 60 MMHG | TEMPERATURE: 95.9 F

## 2022-06-16 PROCEDURE — G0153 HHCP-SVS OF S/L PATH,EA 15MN: HCPCS

## 2022-06-16 NOTE — HOME HEALTH
Patient was sitting in the livingroom today. Patient was very drowsy. Wife reported that patient had a good day yesterday, but today, he was tired. Patient was cooperative for the session.    PLAN FOR NEXT VISIT   MEDICAL NECESSITY FOR ONGOING SKILLED THERAPY: Dysphagia therapy to assess/evaluate least restrictive oral intake, minimize aspiration/risk of and restore oropharyngeal function in order to enhance patient's quality of life by improving ability to safely consume least restrictive diet. Cognitive linguistic therapy to restore cognition to increase safety, communication and function in home. Skilled Speech Therapy in terventions/education are necessary due to decreased swallowing function and increased risk of aspiration, decline in cognition and increased dependence on caregiver.   SPECIFIC INTERVENTIONS AND GOALS TO ADDRESS ON NEXT VISIT:   Education- Strategies to increase/maintain cognitive skills  Cognitive linguistic tasks   FREQUENCY AND DURATION: 2w4   REASSESSMENT DUE DATE: 6/26/22

## 2022-06-16 NOTE — CASE COMMUNICATION
Patient missed a HHA visit from [facility] on 6/10/2022     Reason: HHA was on vacation      For your records only.   As per home health protocol, MD must be notified of missed/cancelled visits; therefore the prescribed frequency was not met.

## 2022-06-16 NOTE — HOME HEALTH
HHA to continue with POC to assist pt. with bathing and self care tasks.  OT plans to discharge OT and HHA services next week.

## 2022-06-16 NOTE — HOME HEALTH
MSW eval with patients spouse to assist with obtaining community resources. Spouse is in need of additional assistance in home and now has receiced call back from a private caregiver referred by friend and is able to start next week. Provided list of agencies in the event of a change. Completed [employer] paperwork. Provided information and directions on reaching out to [facility] for assistance. Patient states that son can assist. WIll contact MSW with any additional needs or concerns.

## 2022-06-16 NOTE — HOME HEALTH
"Subjective: Pt. and spouse report they are doing \"OK\". Spouse reports that she has found a paid caregiver for 3-4 days to assist with patient's care - caregiver starts on Friday.  MSW is scheduled today to assist spouse with [facility] benefits and provide other resources as needed.    No falls or medication changes reported.   Pt. pleasant and cooperative and tolerated all activities well this date.   Plan for next visit: OT to further address ADL, caregiver ed/training, BUE & core strengthening, functional transfers & mobility r/t ADL performance. OT to complete ed/training with paid caregiver next visit - next visit will be next week d/t pt. having MD appt. on 6/16 and OT scheduled off on 6/17/22.  OT discussed plans to discharge OT and HHA services next week - spouse verbalized understanding.  MEDICAL NECESSITY FOR ONGOING SKILLED THERAPY: Patient requires skilled occupational therapy for remediation of deficits to improve activities of daily living, home safety, falls prevention, monitor vital signs, including pulse oximetry, hand/ upper extremity function/range of motion/strength, therapeutic exercise, safety in home, and improve endurance and fatigue management with self care, and functional mobility with durable medical equipment as necessary"

## 2022-06-17 ENCOUNTER — HOME CARE VISIT (OUTPATIENT)
Dept: HOME HEALTH SERVICES | Facility: HOME HEALTHCARE | Age: 82
End: 2022-06-17

## 2022-06-17 VITALS
DIASTOLIC BLOOD PRESSURE: 62 MMHG | SYSTOLIC BLOOD PRESSURE: 112 MMHG | RESPIRATION RATE: 18 BRPM | TEMPERATURE: 96.4 F | HEART RATE: 68 BPM

## 2022-06-17 PROCEDURE — G0156 HHCP-SVS OF AIDE,EA 15 MIN: HCPCS

## 2022-06-20 ENCOUNTER — HOME CARE VISIT (OUTPATIENT)
Dept: HOME HEALTH SERVICES | Facility: HOME HEALTHCARE | Age: 82
End: 2022-06-20

## 2022-06-20 VITALS
SYSTOLIC BLOOD PRESSURE: 112 MMHG | RESPIRATION RATE: 18 BRPM | TEMPERATURE: 97.4 F | DIASTOLIC BLOOD PRESSURE: 64 MMHG | HEART RATE: 64 BPM

## 2022-06-20 PROCEDURE — G0156 HHCP-SVS OF AIDE,EA 15 MIN: HCPCS

## 2022-06-20 RX ORDER — CLOPIDOGREL BISULFATE 75 MG/1
TABLET ORAL
Qty: 90 TABLET | Refills: 1 | Status: SHIPPED | OUTPATIENT
Start: 2022-06-20 | End: 2022-11-18

## 2022-06-21 ENCOUNTER — HOME CARE VISIT (OUTPATIENT)
Dept: HOME HEALTH SERVICES | Facility: HOME HEALTHCARE | Age: 82
End: 2022-06-21

## 2022-06-21 VITALS
OXYGEN SATURATION: 99 % | HEART RATE: 64 BPM | TEMPERATURE: 96.6 F | SYSTOLIC BLOOD PRESSURE: 120 MMHG | RESPIRATION RATE: 18 BRPM | DIASTOLIC BLOOD PRESSURE: 64 MMHG

## 2022-06-21 PROCEDURE — G0153 HHCP-SVS OF S/L PATH,EA 15MN: HCPCS

## 2022-06-22 ENCOUNTER — HOME CARE VISIT (OUTPATIENT)
Dept: HOME HEALTH SERVICES | Facility: HOME HEALTHCARE | Age: 82
End: 2022-06-22

## 2022-06-22 PROCEDURE — G0152 HHCP-SERV OF OT,EA 15 MIN: HCPCS

## 2022-06-22 NOTE — HOME HEALTH
Patient was sitting in the livingroom today. Patient's wife and new caregiver were present for the session. They were looking at family pictures in an album.   Patient will be discharged on the next session. Wife has requested more worksheets to continue working with patient after discharge. SLP provided strategies to continue at home (ie. a book of mazes, dot to dot or hidden pictures).     PLAN FOR NEXT VISIT   MEDICAL NECESSITY FOR ONGOING SKILLED THERAPY: Dysphagia therapy to assess/evaluate least restrictive oral intake, minimize aspiration/risk of and restore oropharyngeal function in order to enhance patient's quality of life by improving ability to safely consume least restrictive diet. Cognitive linguistic therapy to restore cognition to increase safety, communication and function in home. Skilled Speech Therapy in terventions/education are necessary due to decreased swallowing function and increased risk of aspiration, decline in cognition and increased dependence on caregiver.   SPECIFIC INTERVENTIONS AND GOALS TO ADDRESS ON NEXT VISIT:   Discharge instructions  Education- Strategies to increase/maintain cognitive skills   Cognitive linguistic tasks   FREQUENCY AND DURATION: Discharge  REASSESSMENT DUE DATE: 6/26/22

## 2022-06-23 ENCOUNTER — HOME CARE VISIT (OUTPATIENT)
Dept: HOME HEALTH SERVICES | Facility: HOME HEALTHCARE | Age: 82
End: 2022-06-23

## 2022-06-23 VITALS
OXYGEN SATURATION: 96 % | DIASTOLIC BLOOD PRESSURE: 80 MMHG | TEMPERATURE: 97.2 F | HEART RATE: 68 BPM | RESPIRATION RATE: 18 BRPM | SYSTOLIC BLOOD PRESSURE: 142 MMHG

## 2022-06-23 VITALS
HEART RATE: 70 BPM | RESPIRATION RATE: 18 BRPM | DIASTOLIC BLOOD PRESSURE: 60 MMHG | SYSTOLIC BLOOD PRESSURE: 125 MMHG | OXYGEN SATURATION: 98 % | TEMPERATURE: 96.8 F

## 2022-06-23 PROCEDURE — G0153 HHCP-SVS OF S/L PATH,EA 15MN: HCPCS

## 2022-06-23 NOTE — HOME HEALTH
Subjective:  Pt. sitting at kitchen table finishing breakfast this date with spouse, daughter, and paid caregiver present this date.  Pt's. spouse reports no new concerns or complaints - she is thankful to have paid caregiver.  Paid caregiver is working 4 days a week and is available in morning which is when pt. requires more assistance.    No falls or medication changes reported.      OT visit focused on caregiver ed/training with paid caregiver for functional mobility, transfers, ADL, and BUE HEP.  Paid caregiver demonstrates good understanding of how to best assist patient.  OT services are dscharged this date d/t pt. progress has plateaued.  Pt's. spouse is in agreement with discharge.      SLP to continue for one more visit.    Discharge plan is to caregiver support - spouse.

## 2022-06-23 NOTE — HOME HEALTH
TODAY'S INTERVENTIONS / EDUCATION / PATIENT'S RESPONSE / GOAL STATUS:  Patient was presented worksheets requiring him to match definitions to words and naming words that are the same.   Patient completed the first worksheet with independence (90 percent accuracy) and required minimal cueing to move through the second worksheet (80 percent accuracy).   EXPLANATION OF UNMET GOALS: Maximum benefit achieved from the rehab program  DISCHARGE STATUS     To family  DISCHARGE CONDITION  Good  DISCHARGE REASON    Maximum benefit achieved from the rehab program    INSTRUCTIONS HOME PROGRAM PROVIDED TO (Wife and caregiver)          CONTENT  Wife and caregiver were presented skilled instructions on activities to continue daily. A packet of worksheets was presented with instructions. They were instructed to present word search puzzles/games/time to play his piano/Mazes to stimulate cognitive linguistic skills.   PATIENT CAREGIVER LEVEL OF COMPLIANCE  Compliant/Verbalized understanding    Speech Therapy discharge /Agency discharge 6/23/22

## 2022-06-30 NOTE — CASE COMMUNICATION
Patient missed a HHA visit from [facility] on 6/22/2022    Reason: pt as discharged.       For your records only.   As per home health protocol, MD must be notified of missed/cancelled visits; therefore the prescribed frequency was not met.

## 2022-07-07 RX ORDER — LEVOTHYROXINE SODIUM 88 UG/1
TABLET ORAL
Qty: 90 TABLET | Refills: 1 | Status: SHIPPED | OUTPATIENT
Start: 2022-07-07 | End: 2022-12-16

## 2022-07-07 NOTE — TELEPHONE ENCOUNTER
Rx Refill Note  Requested Prescriptions     Pending Prescriptions Disp Refills    levothyroxine (SYNTHROID, LEVOTHROID) 88 MCG tablet [Pharmacy Med Name: LEVOTHYROXIN TAB 88MCG] 90 tablet 1     Sig: TAKE 1 TABLET DAILY      Last office visit with prescribing clinician: Adrianne 3 mo ago   Next office visit with prescribing clinician: Visit date not found            Afia Baker  07/07/22, 08:56 EDT

## 2022-08-25 NOTE — PROGRESS NOTES
Subjective   Griffin Angeles is a 81 y.o. male.      F/u for dm 1, hyperlipidemia, ED/ testing bs 4 x day using  the freestyle nitza / last dm eye exam 12/16/21 with dr Acevedo / last dm foot exam 3/21/22  with dr Silver              Patient is a 81-year-old male who came in for diabetes control. He has known diabetes mellitus for 30 years. He has been on Tresiba 8 q AM and Novolog 6-7 units with each meal.  He has freestyle nitza sensor 14.  He has gained 3 lbs since 3/22.  His last meal was at 9 AM.     Sensor data reviewed.    Average glucose 146 mg per DL.  Time in target 78%.  Time above target 22%.  Time below target 0%.  He has no early morning hypoglycemia.  Highest blood sugar are in the early afternoon.    His last eye examination was in 12/21. He has nonproliferative diabetic retinopathy and had an embolic plaque on left retinal artery.  He denies numbness, tingling or burning on his hands or feet. He has albuminuria on urine sample taken in 8/21. He is on losartan.       He had previous thyroidectomy for unknown thyroid disease. He has been on levothyroxine 88 mcg per day.      He has known coronary disease and had previous angioplasty with stent and history of hypertension. He had a normal stress test in done in 9/18 by Dr. Kong. He is on Plavix, Toprol, losartan, and Imdur.  He denies chest pain or SOB.      He has hyperlipidemia and has been on Lipitor 20 mg once a day. He denies any myalgia.       He was seen for gait abnormality last September 2017 by Dr. Mcdonald.  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  He was seen by neurologist at the [facility] and was told to have parkinsonism.  Namenda made him too sleepy and it was discontinued.  Entacapone made him anxious.  He is on donezepil 5 mg daily and Sinemet.  He has difficulty with short-term memory.  Carotid ultrasound done in April 2019 showed bilateral  "atherosclerotic plaque but no significant stenosis.    He was admitted for an acute right corona radiator infarct in April 2022.  He had left hemiparesis and spent some time in rehab facility.  He is back home.      He has erectile dysfunction and urinary incontinence and has been seeing Dr. Love. He had penile implant in 8/16.  He has urinary incontinence and is using diapers.  He has nocturia 2-3 times a night.  He is on Flomax prn for urinary retention.       He was seen by Dr. Fan Benoit in April 2019 for diarrhea.  He had an EGD which showed preserved villous structure and Brunner's gland hyperplasia.  Diarrhea did not improve on gluten-free diet.  He had a colonoscopy in May 2019 and had transverse colon polyp with focal adenomatous change without high-grade dysplasia was removed. He denies melena or hematochezia.      Diarrhea resolved in June 2022     He had Moderna vaccine without major side effects.    The following portions of the patient's history were reviewed and updated as appropriate: allergies, current medications, past family history, past medical history, past social history, past surgical history and problem list.    Review of Systems   Eyes: Negative for visual disturbance.   Respiratory: Negative for shortness of breath.    Gastrointestinal: Negative for abdominal pain, blood in stool and constipation.   Endocrine: Negative for cold intolerance and heat intolerance.   Genitourinary: Negative.    Musculoskeletal: Negative for myalgias.   Neurological: Negative for numbness.     Vitals:    08/26/22 1126   BP: 134/72   Pulse: 56   Temp: 97.3 °F (36.3 °C)   SpO2: 96%   Weight: 60.8 kg (134 lb)   Height: 167.6 cm (65.98\")      Objective   Physical Exam  Constitutional:       Appearance: Normal appearance. He is not toxic-appearing or diaphoretic.   Eyes:      General: No scleral icterus.        Right eye: No discharge.         Left eye: No discharge.      Extraocular Movements: Extraocular " movements intact.   Neck:      Vascular: No carotid bruit.   Cardiovascular:      Rate and Rhythm: Normal rate and regular rhythm.      Pulses: Normal pulses.      Heart sounds: Normal heart sounds.   Pulmonary:      Effort: Pulmonary effort is normal.      Breath sounds: Normal breath sounds. No rales.   Chest:      Chest wall: No tenderness.   Abdominal:      General: Bowel sounds are normal.      Palpations: Abdomen is soft.      Tenderness: There is no right CVA tenderness or left CVA tenderness.   Musculoskeletal:      Cervical back: Normal range of motion.   Lymphadenopathy:      Cervical: No cervical adenopathy.   Skin:     General: Skin is warm and dry.   Neurological:      Mental Status: He is alert and oriented to person, place, and time.       No results displayed because visit has over 200 results.        Assessment & Plan   Diagnoses and all orders for this visit:    1. 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 Coronary artery disease involving native coronary artery of native heart without angina pectoris  -     Comprehensive Metabolic Panel  -     Lipid Panel    3. Hyperlipidemia, unspecified hyperlipidemia type  -     Lipid Panel    4. Postoperative hypothyroidism  -     TSH  -     T4, Free    5. Benign prostatic hyperplasia with urinary obstruction  -     Comprehensive Metabolic Panel    6. Type 1 diabetes mellitus with complications (HCC)  -     Comprehensive Metabolic Panel  -     Lipid Panel  -     Hemoglobin A1c  -     Microalbumin / Creatinine Urine Ratio - Urine, Clean Catch    7. Parkinson's disease (HCC)    Other orders  -     insulin degludec (Tresiba FlexTouch) 100 UNIT/ML solution pen-injector injection; 8 units every morning  Dispense: 15 mL; Refill: 2      Continue Tresiba 8 units every morning and NovoLog 3 times daily  with meals.  Continue levothyroxine 88 mcg/day.  Continue Lipitor 20 mg/day.  Continue Plavix 75 mg/day.  Continue Sinemet.  Advised to follow-up with new neurologist.  Follow-up with Dr. Love.    Copy of my note sent to Dr. Quevedo, Dr. Love, Dr. Benoit.    RTC 6 mos with OSIEL Singleton NP

## 2022-08-26 ENCOUNTER — OFFICE VISIT (OUTPATIENT)
Dept: ENDOCRINOLOGY | Age: 82
End: 2022-08-26

## 2022-08-26 VITALS
BODY MASS INDEX: 21.53 KG/M2 | OXYGEN SATURATION: 96 % | DIASTOLIC BLOOD PRESSURE: 72 MMHG | TEMPERATURE: 97.3 F | SYSTOLIC BLOOD PRESSURE: 134 MMHG | HEIGHT: 66 IN | HEART RATE: 56 BPM | WEIGHT: 134 LBS

## 2022-08-26 DIAGNOSIS — E78.5 HYPERLIPIDEMIA, UNSPECIFIED HYPERLIPIDEMIA TYPE: ICD-10-CM

## 2022-08-26 DIAGNOSIS — G20 PARKINSON'S DISEASE: ICD-10-CM

## 2022-08-26 DIAGNOSIS — E89.0 POSTOPERATIVE HYPOTHYROIDISM: ICD-10-CM

## 2022-08-26 DIAGNOSIS — E10.29 TYPE 1 DIABETES MELLITUS WITH PROTEINURIA: Primary | ICD-10-CM

## 2022-08-26 DIAGNOSIS — N40.1 BENIGN PROSTATIC HYPERPLASIA WITH URINARY OBSTRUCTION: ICD-10-CM

## 2022-08-26 DIAGNOSIS — E10.8 TYPE 1 DIABETES MELLITUS WITH COMPLICATIONS: ICD-10-CM

## 2022-08-26 DIAGNOSIS — N13.8 BENIGN PROSTATIC HYPERPLASIA WITH URINARY OBSTRUCTION: ICD-10-CM

## 2022-08-26 DIAGNOSIS — I25.10 CORONARY ARTERY DISEASE INVOLVING NATIVE CORONARY ARTERY OF NATIVE HEART WITHOUT ANGINA PECTORIS: ICD-10-CM

## 2022-08-26 DIAGNOSIS — R80.9 TYPE 1 DIABETES MELLITUS WITH PROTEINURIA: Primary | ICD-10-CM

## 2022-08-26 PROBLEM — R11.2 NAUSEA VOMITING AND DIARRHEA: Status: RESOLVED | Noted: 2022-04-12 | Resolved: 2022-08-26

## 2022-08-26 PROBLEM — R19.7 NAUSEA VOMITING AND DIARRHEA: Status: RESOLVED | Noted: 2022-04-12 | Resolved: 2022-08-26

## 2022-08-26 PROCEDURE — 99214 OFFICE O/P EST MOD 30 MIN: CPT | Performed by: INTERNAL MEDICINE

## 2022-08-26 PROCEDURE — 95251 CONT GLUC MNTR ANALYSIS I&R: CPT | Performed by: INTERNAL MEDICINE

## 2022-08-26 RX ORDER — INSULIN DEGLUDEC INJECTION 100 U/ML
INJECTION, SOLUTION SUBCUTANEOUS
Qty: 15 ML | Refills: 2 | Status: SHIPPED | OUTPATIENT
Start: 2022-08-26 | End: 2023-02-09 | Stop reason: SDUPTHER

## 2022-08-27 LAB
ALBUMIN SERPL-MCNC: 3.8 G/DL (ref 3.7–4.7)
ALBUMIN/CREAT UR: 936 MG/G CREAT (ref 0–29)
ALBUMIN/GLOB SERPL: 1.5 {RATIO} (ref 1.2–2.2)
ALP SERPL-CCNC: 306 IU/L (ref 44–121)
ALT SERPL-CCNC: 8 IU/L (ref 0–44)
AST SERPL-CCNC: 8 IU/L (ref 0–40)
BILIRUB SERPL-MCNC: 0.4 MG/DL (ref 0–1.2)
BUN SERPL-MCNC: 27 MG/DL (ref 8–27)
BUN/CREAT SERPL: 24 (ref 10–24)
CALCIUM SERPL-MCNC: 9.5 MG/DL (ref 8.6–10.2)
CHLORIDE SERPL-SCNC: 101 MMOL/L (ref 96–106)
CHOLEST SERPL-MCNC: 137 MG/DL (ref 100–199)
CO2 SERPL-SCNC: 25 MMOL/L (ref 20–29)
CREAT SERPL-MCNC: 1.11 MG/DL (ref 0.76–1.27)
CREAT UR-MCNC: 44.7 MG/DL
EGFRCR-CYS SERPLBLD CKD-EPI 2021: 67 ML/MIN/1.73
GLOBULIN SER CALC-MCNC: 2.5 G/DL (ref 1.5–4.5)
GLUCOSE SERPL-MCNC: 138 MG/DL (ref 65–99)
HBA1C MFR BLD: 7.2 % (ref 4.8–5.6)
HDLC SERPL-MCNC: 66 MG/DL
IMP & REVIEW OF LAB RESULTS: NORMAL
LDLC SERPL CALC-MCNC: 51 MG/DL (ref 0–99)
MICROALBUMIN UR-MCNC: 418.6 UG/ML
POTASSIUM SERPL-SCNC: 5 MMOL/L (ref 3.5–5.2)
PROT SERPL-MCNC: 6.3 G/DL (ref 6–8.5)
SODIUM SERPL-SCNC: 141 MMOL/L (ref 134–144)
T4 FREE SERPL-MCNC: 1.46 NG/DL (ref 0.82–1.77)
TRIGL SERPL-MCNC: 109 MG/DL (ref 0–149)
TSH SERPL DL<=0.005 MIU/L-ACNC: 0.82 UIU/ML (ref 0.45–4.5)
VLDLC SERPL CALC-MCNC: 20 MG/DL (ref 5–40)

## 2022-08-28 NOTE — PROGRESS NOTES
LDL 51.  HDL 66.  Cholesterol 137.  Continue Lipitor 20 mg/day.  Hemoglobin A1c 7.2%.  Diabetes in fair control.  Normal thyroid function tests.  Continue levothyroxine 88 mcg/day.  Urine microalbumin elevated.  Continue losartan.  Send copy of labs to Dr. Quevedo and Dr. Benoit.  Copy of labs sent to Dr. Love and to patient through "[employer].

## 2022-11-10 ENCOUNTER — OFFICE VISIT (OUTPATIENT)
Dept: CARDIOLOGY | Facility: CLINIC | Age: 82
End: 2022-11-10

## 2022-11-10 VITALS
BODY MASS INDEX: 20.99 KG/M2 | HEART RATE: 58 BPM | HEIGHT: 67 IN | SYSTOLIC BLOOD PRESSURE: 132 MMHG | DIASTOLIC BLOOD PRESSURE: 72 MMHG

## 2022-11-10 DIAGNOSIS — I10 ESSENTIAL HYPERTENSION: ICD-10-CM

## 2022-11-10 DIAGNOSIS — E78.5 HYPERLIPIDEMIA, UNSPECIFIED HYPERLIPIDEMIA TYPE: ICD-10-CM

## 2022-11-10 DIAGNOSIS — I25.10 CORONARY ARTERY DISEASE INVOLVING NATIVE CORONARY ARTERY OF NATIVE HEART WITHOUT ANGINA PECTORIS: Primary | ICD-10-CM

## 2022-11-10 PROCEDURE — 93000 ELECTROCARDIOGRAM COMPLETE: CPT | Performed by: INTERNAL MEDICINE

## 2022-11-10 PROCEDURE — 99213 OFFICE O/P EST LOW 20 MIN: CPT | Performed by: INTERNAL MEDICINE

## 2022-11-10 RX ORDER — ESCITALOPRAM OXALATE 20 MG/1
TABLET ORAL
COMMUNITY
Start: 2022-11-02

## 2022-11-10 RX ORDER — ATORVASTATIN CALCIUM 20 MG/1
20 TABLET, FILM COATED ORAL DAILY
COMMUNITY

## 2022-11-10 RX ORDER — ISOSORBIDE MONONITRATE 30 MG/1
30 TABLET, EXTENDED RELEASE ORAL DAILY
Qty: 90 TABLET | Refills: 0 | Status: SHIPPED | OUTPATIENT
Start: 2022-11-10 | End: 2022-12-07

## 2022-11-10 NOTE — PROGRESS NOTES
Subjective:        Griffin Angeles is a 81 y.o. male who here for follow up    CC  Follow-up coronary artery disease hypertension hyperlipidemia  HPI  81-year-old male with known history of coronary artery disease hypertension hyperlipidemia here for the follow-up with no complaints of chest pains tightness heaviness or the pressure sensation     Problems Addressed this Visit        Cardiac and Vasculature    CAD (coronary artery disease) - Primary    Relevant Orders    Adult Transthoracic Echo Complete W/ Cont if Necessary Per Protocol    Hyperlipidemia    Relevant Medications    atorvastatin (LIPITOR) 20 MG tablet    Essential hypertension   Diagnoses       Codes Comments    Coronary artery disease involving native coronary artery of native heart without angina pectoris    -  Primary ICD-10-CM: I25.10  ICD-9-CM: 414.01     Essential hypertension     ICD-10-CM: I10  ICD-9-CM: 401.9     Hyperlipidemia, unspecified hyperlipidemia type     ICD-10-CM: E78.5  ICD-9-CM: 272.4         .    The following portions of the patient's history were reviewed and updated as appropriate: allergies, current medications, past family history, past medical history, past social history, past surgical history and problem list.    Past Medical History:   Diagnosis Date   • BPH (benign prostatic hypertrophy)    • CAD (coronary artery disease)    • Dementia ([facility])    • Fall 05/2022   • Hydrocephalus ([facility])    • Hyperlipidemia    • Hyperparathyroidism ([facility])    • Hypertension    • Hypothyroidism    • Lacunar infarction ([facility])     Bilateral basal ganglia   • Parkinsonism ([facility])    • Postherpetic neuralgia    • Shingles 06/2018   • Stroke ([facility])    • Type 2 diabetes mellitus ([facility])      reports that he quit smoking about 36 years ago. His smoking use included pipe. He has never used smokeless tobacco. He reports that he does not currently use alcohol. He reports that he does not use drugs.   Family History   Problem Relation Age of Onset   • Diabetes  "Mother    • Thyroid disease Mother    • Stroke Mother    • Diabetes Father    • Kidney failure Father    • Heart disease Sister        Review of Systems  Constitutional: No wt loss, fever, fatigue  Gastrointestinal: No nausea, abdominal pain  Behavioral/Psych: No insomnia or anxiety   Cardiovascular no chest pains or tightness in the chest  Objective:       Physical Exam  /72   Pulse 58   Ht 170.2 cm (67\")   BMI 20.99 kg/m²   General appearance: No acute changes   Neck: Trachea midline; NECK, supple, no thyromegaly or lymphadenopathy   Lungs: Normal size and shape, normal breath sounds, equal distribution of air, no rales and rhonchi   CV: S1-S2 regular, no murmurs, no rub, no gallop   Abdomen: Soft, nontender; no masses , no abnormal abdominal sounds   Extremities: No deformity , normal color , no peripheral edema   Skin: Normal temperature, turgor and texture; no rash, ulcers            ECG 12 Lead    Date/Time: 11/10/2022 2:12 PM  Performed by: Moisés Kong MD  Authorized by: Moisés Kong MD   Comparison: compared with previous ECG   Similar to previous ECG  Rhythm: sinus rhythm  ST Flattening: all    Clinical impression: non-specific ECG              Echocardiogram:        Current Outpatient Medications:   •  atorvastatin (LIPITOR) 20 MG tablet, Take 1 tablet by mouth Daily., Disp: , Rfl:   •  carbidopa-levodopa (SINEMET)  MG per tablet, Take 2 tablets by mouth 3 (Three) Times a Day With Meals. (Patient taking differently: Take 2 tablets by mouth 2 (Two) Times a Day.), Disp: , Rfl:   •  Cholecalciferol (VITAMIN D3) 1000 units capsule, Take 1 capsule by mouth Daily., Disp: , Rfl:   •  clopidogrel (PLAVIX) 75 MG tablet, TAKE 1 TABLET DAILY, Disp: 90 tablet, Rfl: 1  •  escitalopram (LEXAPRO) 20 MG tablet, , Disp: , Rfl:   •  fluticasone (FLONASE) 50 MCG/ACT nasal spray, 2 sprays into the nostril(s) as directed by provider Daily As Needed., Disp: , Rfl:   •  insulin degludec " "(Tresiba FlexTouch) 100 UNIT/ML solution pen-injector injection, 8 units every morning, Disp: 15 mL, Rfl: 2  •  levothyroxine (SYNTHROID, LEVOTHROID) 88 MCG tablet, TAKE 1 TABLET DAILY, Disp: 90 tablet, Rfl: 1  •  losartan (COZAAR) 50 MG tablet, Take 50 mg by mouth Daily., Disp: , Rfl:   •  metoprolol succinate XL (TOPROL-XL) 25 MG 24 hr tablet, 25 mg Daily. 1/2 tablet daily, Disp: , Rfl: 3  •  NovoLOG FlexPen 100 UNIT/ML solution pen-injector sc pen, INJECT 5-6 UNITS SUBCUTANEOUSLY AT BREAKFAST, 5-6 UNITS AT LUNCH AND 5-6 UNITS AT DINNER, Disp: 30 mL, Rfl: 1  •  tamsulosin (FLOMAX) 0.4 MG capsule 24 hr capsule, 2 capsules Daily., Disp: , Rfl:   •  ACCU-CHEK SOFTCLIX LANCETS lancets, Dx code E10.8 testing bs 3 x day, Disp: 300 each, Rfl: 1  •  BD Pen Needle Aydee 2nd Gen 32G X 4 MM misc, USE AS DIRECTED 4 TIMES A  DAY, Disp: 400 each, Rfl: 2  •  Continuous Blood Gluc Sensor (FreeStyle Dione 2 Sensor) misc, 1 each Every 14 (Fourteen) Days. Use every 14 days.  Dx: E 1, Disp: 2 each, Rfl: 6  •  donepezil (ARICEPT) 10 MG tablet, TAKE 1 TABLET BY MOUTH ONCE DAILY AT NIGHT, Disp: 90 tablet, Rfl: 1  •  Insulin Syringe-Needle U-100 (RELION INSULIN SYRINGE) 31G X 15/64\" 0.5 ML misc, Using 3 syringes daily, Disp: 300 each, Rfl: 1  •  isosorbide mononitrate (IMDUR) 30 MG 24 hr tablet, Take 1 tablet by mouth Daily., Disp: 90 tablet, Rfl: 0   Assessment:        Patient Active Problem List   Diagnosis   • CAD (coronary artery disease)   • Hyperlipidemia   • Postoperative hypothyroidism   • Abnormal cardiovascular stress test   • Erectile dysfunction   • Benign prostatic hyperplasia with urinary obstruction   • Essential hypertension   • Laryngitis   • Postherpetic neuralgia   • 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 Coronary artery disease involving native coronary artery of native heart without angina pectoris  I25.10 414.01   2. Essential hypertension  I10 401.9   3. Hyperlipidemia, unspecified hyperlipidemia type  E78.5 272.4     1. Coronary artery disease involving native coronary artery of native heart without angina pectoris  No angina pectoris  - Adult Transthoracic Echo Complete W/ Cont if Necessary Per Protocol    2. Essential hypertension  Continue current treatment    3. Hyperlipidemia, unspecified hyperlipidemia type  Continue current treatment       6 MONTHS  WITH ECHO  COUNSELING:    Griffin Eisenberg was given to patient for the following topics: diagnostic results, risk factor reductions, impressions, risks and benefits of treatment options and importance of treatment compliance .       SMOKING COUNSELING:        Dictated using Dragon dictation

## 2022-11-18 RX ORDER — CLOPIDOGREL BISULFATE 75 MG/1
TABLET ORAL
Qty: 90 TABLET | Refills: 1 | Status: SHIPPED | OUTPATIENT
Start: 2022-11-18

## 2022-11-28 RX ORDER — PEN NEEDLE, DIABETIC 32GX 5/32"
NEEDLE, DISPOSABLE MISCELLANEOUS
Qty: 400 EACH | Refills: 2 | Status: SHIPPED | OUTPATIENT
Start: 2022-11-28

## 2022-12-07 RX ORDER — ISOSORBIDE MONONITRATE 30 MG/1
TABLET, EXTENDED RELEASE ORAL
Qty: 90 TABLET | Refills: 1 | Status: SHIPPED | OUTPATIENT
Start: 2022-12-07

## 2022-12-16 RX ORDER — LEVOTHYROXINE SODIUM 88 UG/1
TABLET ORAL
Qty: 90 TABLET | Refills: 1 | Status: SHIPPED | OUTPATIENT
Start: 2022-12-16

## 2022-12-30 ENCOUNTER — TELEPHONE (OUTPATIENT)
Dept: ENDOCRINOLOGY | Age: 82
End: 2022-12-30

## 2023-02-09 ENCOUNTER — OFFICE VISIT (OUTPATIENT)
Dept: ENDOCRINOLOGY | Age: 83
End: 2023-02-09
Payer: MEDICARE

## 2023-02-09 VITALS
SYSTOLIC BLOOD PRESSURE: 122 MMHG | WEIGHT: 147.9 LBS | BODY MASS INDEX: 23.21 KG/M2 | DIASTOLIC BLOOD PRESSURE: 72 MMHG | TEMPERATURE: 96.8 F | HEIGHT: 67 IN | OXYGEN SATURATION: 99 % | HEART RATE: 63 BPM

## 2023-02-09 DIAGNOSIS — R80.9 TYPE 1 DIABETES MELLITUS WITH PROTEINURIA: Primary | ICD-10-CM

## 2023-02-09 DIAGNOSIS — E89.0 POSTOPERATIVE HYPOTHYROIDISM: ICD-10-CM

## 2023-02-09 DIAGNOSIS — I10 ESSENTIAL HYPERTENSION: ICD-10-CM

## 2023-02-09 DIAGNOSIS — E10.29 TYPE 1 DIABETES MELLITUS WITH PROTEINURIA: Primary | ICD-10-CM

## 2023-02-09 DIAGNOSIS — E78.5 HYPERLIPIDEMIA, UNSPECIFIED HYPERLIPIDEMIA TYPE: ICD-10-CM

## 2023-02-09 PROCEDURE — 95251 CONT GLUC MNTR ANALYSIS I&R: CPT | Performed by: NURSE PRACTITIONER

## 2023-02-09 PROCEDURE — 99214 OFFICE O/P EST MOD 30 MIN: CPT | Performed by: NURSE PRACTITIONER

## 2023-02-09 RX ORDER — INSULIN DEGLUDEC INJECTION 100 U/ML
INJECTION, SOLUTION SUBCUTANEOUS
Qty: 15 ML | Refills: 2 | Status: SHIPPED | OUTPATIENT
Start: 2023-02-09

## 2023-02-09 RX ORDER — INSULIN ASPART 100 [IU]/ML
9-10 INJECTION, SOLUTION INTRAVENOUS; SUBCUTANEOUS
Qty: 9 ML | Refills: 3 | Status: SHIPPED | OUTPATIENT
Start: 2023-02-09

## 2023-02-09 NOTE — PROGRESS NOTES
Chief Complaint  Chief Complaint   Patient presents with   • Diabetes     Type1: Dione,he has no hx of retinopathy, or neuropathy        Subjective          History of Present Illness    Griffin Angeles 82 y.o. presents for a follow-up evaluation for type 1 DM    He has been diabetic for over 30 years    Pt is on the following medications for their DM: Tresiba 8 units daily and Novolog 8-9 units with each meal.      Denies diarrhea, constipation, chest pain, shortness of breath, vision changes or numbness and tingling in feet/hands.      Pt does have a history of nonproliferative diabetic retinopathy and had an embolic plaque on left retinal artery.  Last eye exam was 12/21    Pt does have a history of nephropathy.  Patient is currently taking ARB    Pt does not have neuropathy.    Pt does have a history of CAD and had previous angioplasty with stent and history of hypertension  He was admitted for an acute right corona radiator infarct in April 2022    Last A1C in 08/22 was 7.2    Last microalbumin in 08/22 was positive          Blood Sugars    Blood glucoses are checked via Dione    Fasting blood glucoses: low 100s    Pre-meal blood glucoses: mid 100s - 200s    Pt has rare episodes of hypoglycemia.        Sensor Data    Time in range 60%  High 29%  Very high 11%  Low 0%  Very low 0%      Average Glucose - 173 mg/dL      Sensor Wear - 80 %          Hypothyroid    He had previous thyroidectomy for unknown thyroid disease    PT complains of dry skin    Current treatment is levothyroxine 88 mcg daily    Last labs in 08/22 showed TSH 0.823 and FT4 1.46             Hyperlipidemia     Pt denies any muscle/body aches, chest pain or shortness of breath    Pt is currently taking atorvastatin 20 mg HS    Last lipid panel in 08/22 showed Total 137, Triglycerides 109, HDL 66 and LDL 51            Hypertension    Pt denies any chest pain, palpitations, shortness of breath or headache    Current regimen includes metoprolol  "succinate 25 mg daily, Imdur 30 mg daily and losartan 50 mg daily    Follows with Dr. Kong          I have reviewed the patient's allergies, medicines, past medical hx, family hx and social hx.    Objective   Vital Signs:   /72   Pulse 63   Temp 96.8 °F (36 °C) (Temporal)   Ht 170.2 cm (67\")   Wt 67.1 kg (147 lb 14.4 oz)   SpO2 99%   BMI 23.16 kg/m²       Physical Exam   Physical Exam  Constitutional:       General: He is not in acute distress.     Appearance: Normal appearance. He is not diaphoretic.   HENT:      Head: Normocephalic and atraumatic.   Eyes:      General:         Right eye: No discharge.         Left eye: No discharge.   Skin:     General: Skin is warm and dry.   Neurological:      Mental Status: He is alert.   Psychiatric:         Mood and Affect: Mood normal.         Behavior: Behavior normal.                    Results Review:   Hemoglobin A1C   Date Value Ref Range Status   08/26/2022 7.2 (H) 4.8 - 5.6 % Final     Comment:              Prediabetes: 5.7 - 6.4           Diabetes: >6.4           Glycemic control for adults with diabetes: <7.0     04/12/2022 7.00 (H) 4.80 - 5.60 % Final     Total Cholesterol   Date Value Ref Range Status   04/14/2022 122 0 - 200 mg/dL Final     Triglycerides   Date Value Ref Range Status   08/26/2022 109 0 - 149 mg/dL Final   04/14/2022 76 0 - 150 mg/dL Final     HDL Cholesterol   Date Value Ref Range Status   08/26/2022 66 >39 mg/dL Final   04/14/2022 55 40 - 60 mg/dL Final     LDL Chol Calc (NIH)   Date Value Ref Range Status   08/26/2022 51 0 - 99 mg/dL Final     VLDL Cholesterol Woody   Date Value Ref Range Status   08/26/2022 20 5 - 40 mg/dL Final     LDL/HDL Ratio   Date Value Ref Range Status   04/14/2022 0.94  Final     LDL/HDL RATIO   Date Value Ref Range Status   07/29/2021 0.9 0.0 - 3.6 ratio Final     Comment:                                         LDL/HDL Ratio                                              Men  Women                     "            1/2 Avg.Risk  1.0    1.5                                    Avg.Risk  3.6    3.2                                 2X Avg.Risk  6.2    5.0                                 3X Avg.Risk  8.0    6.1           Assessment and Plan {CC Problem List  Visit Diagnosis  ROS  Review (Popup)  Health Maintenance  Quality  BestPractice  Medications  SmartSets  SnapShot Encounters  Media :23  Diagnoses and all orders for this visit:    1. 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  Dispense: 9 mL; Refill: 3  -     insulin degludec (Tresiba FlexTouch) 100 UNIT/ML solution pen-injector injection; 8 units every morning  Dispense: 15 mL; Refill: 2  -     Hemoglobin A1c  -     Comprehensive Metabolic Panel    Continue with Tresiba 8 units daily   Change Novolog 9-10 units with before meal   Continue with Dione  Check labs        2. Postoperative hypothyroidism  -     TSH  -     T4, Free    Check labs today  Continue with levothyroxine 88 mcg daily        3. Hyperlipidemia, unspecified hyperlipidemia type  -     Comprehensive Metabolic Panel  -     Lipid Panel    Check labs today  Continue with statin      4. Essential hypertension  -     Comprehensive Metabolic Panel    Stable  Continue with current medication regimen  Defer management to PCP            Refills sent to pharmacy    Labs  RTC in 6 months with Dr. Silver      Follow Up     Patient was given instructions and counseling regarding her condition or for health maintenance advice. Please see specific information pulled into the AVS if appropriate.              Dayanara Singleton, APRN  02/09/23

## 2023-02-10 LAB
ALBUMIN SERPL-MCNC: 3.5 G/DL (ref 3.5–5.2)
ALBUMIN/GLOB SERPL: 1.5 G/DL
ALP SERPL-CCNC: 352 U/L (ref 39–117)
ALT SERPL-CCNC: 5 U/L (ref 1–41)
AST SERPL-CCNC: 11 U/L (ref 1–40)
BILIRUB SERPL-MCNC: 0.3 MG/DL (ref 0–1.2)
BUN SERPL-MCNC: 30 MG/DL (ref 8–23)
BUN/CREAT SERPL: 22.2 (ref 7–25)
CALCIUM SERPL-MCNC: 8.9 MG/DL (ref 8.6–10.5)
CHLORIDE SERPL-SCNC: 102 MMOL/L (ref 98–107)
CHOLEST SERPL-MCNC: 137 MG/DL (ref 0–200)
CO2 SERPL-SCNC: 30 MMOL/L (ref 22–29)
CREAT SERPL-MCNC: 1.35 MG/DL (ref 0.76–1.27)
EGFRCR SERPLBLD CKD-EPI 2021: 52.7 ML/MIN/1.73
GLOBULIN SER CALC-MCNC: 2.3 GM/DL
GLUCOSE SERPL-MCNC: 406 MG/DL (ref 65–99)
HBA1C MFR BLD: 7.2 % (ref 4.8–5.6)
HDLC SERPL-MCNC: 62 MG/DL (ref 40–60)
IMP & REVIEW OF LAB RESULTS: NORMAL
LDLC SERPL CALC-MCNC: 47 MG/DL (ref 0–100)
POTASSIUM SERPL-SCNC: 5.1 MMOL/L (ref 3.5–5.2)
PROT SERPL-MCNC: 5.8 G/DL (ref 6–8.5)
SODIUM SERPL-SCNC: 140 MMOL/L (ref 136–145)
T4 FREE SERPL-MCNC: 1.38 NG/DL (ref 0.93–1.7)
TRIGL SERPL-MCNC: 169 MG/DL (ref 0–150)
TSH SERPL DL<=0.005 MIU/L-ACNC: 1.53 UIU/ML (ref 0.27–4.2)
VLDLC SERPL CALC-MCNC: 28 MG/DL (ref 5–40)

## 2023-04-18 RX ORDER — CLOPIDOGREL BISULFATE 75 MG/1
TABLET ORAL
Qty: 90 TABLET | Refills: 1 | Status: SHIPPED | OUTPATIENT
Start: 2023-04-18

## 2023-05-18 ENCOUNTER — HOSPITAL ENCOUNTER (OUTPATIENT)
Dept: CARDIOLOGY | Facility: HOSPITAL | Age: 83
Discharge: HOME | End: 2023-05-18
Admitting: INTERNAL MEDICINE
Payer: MEDICARE

## 2023-05-18 ENCOUNTER — OFFICE VISIT (OUTPATIENT)
Dept: CARDIOLOGY | Facility: CLINIC | Age: 83
End: 2023-05-18
Payer: MEDICARE

## 2023-05-18 VITALS
BODY MASS INDEX: 23.07 KG/M2 | WEIGHT: 147 LBS | HEART RATE: 62 BPM | DIASTOLIC BLOOD PRESSURE: 74 MMHG | SYSTOLIC BLOOD PRESSURE: 164 MMHG | HEIGHT: 67 IN

## 2023-05-18 VITALS
BODY MASS INDEX: 22.68 KG/M2 | SYSTOLIC BLOOD PRESSURE: 171 MMHG | HEART RATE: 56 BPM | DIASTOLIC BLOOD PRESSURE: 73 MMHG | HEIGHT: 68 IN

## 2023-05-18 DIAGNOSIS — E78.5 HYPERLIPIDEMIA, UNSPECIFIED HYPERLIPIDEMIA TYPE: ICD-10-CM

## 2023-05-18 DIAGNOSIS — I10 ESSENTIAL HYPERTENSION: ICD-10-CM

## 2023-05-18 DIAGNOSIS — I25.10 CORONARY ARTERY DISEASE INVOLVING NATIVE CORONARY ARTERY OF NATIVE HEART WITHOUT ANGINA PECTORIS: Primary | ICD-10-CM

## 2023-05-18 LAB
AORTIC DIMENSIONLESS INDEX: 0.4 (DI)
BH CV ECHO MEAS - ACS: 1.34 CM
BH CV ECHO MEAS - AO MAX PG: 15.6 MMHG
BH CV ECHO MEAS - AO MEAN PG: 8.3 MMHG
BH CV ECHO MEAS - AO ROOT DIAM: 1.67 CM
BH CV ECHO MEAS - AO V2 MAX: 197.2 CM/SEC
BH CV ECHO MEAS - AO V2 VTI: 51.7 CM
BH CV ECHO MEAS - AVA(I,D): 1.13 CM2
BH CV ECHO MEAS - EDV(CUBED): 39.7 ML
BH CV ECHO MEAS - EDV(MOD-SP2): 36 ML
BH CV ECHO MEAS - EDV(MOD-SP4): 48 ML
BH CV ECHO MEAS - EF(MOD-BP): 63.2 %
BH CV ECHO MEAS - EF(MOD-SP2): 61.1 %
BH CV ECHO MEAS - EF(MOD-SP4): 64.6 %
BH CV ECHO MEAS - ESV(CUBED): 14.1 ML
BH CV ECHO MEAS - ESV(MOD-SP2): 14 ML
BH CV ECHO MEAS - ESV(MOD-SP4): 17 ML
BH CV ECHO MEAS - FS: 29.1 %
BH CV ECHO MEAS - IVS/LVPW: 0.73 CM
BH CV ECHO MEAS - IVSD: 0.87 CM
BH CV ECHO MEAS - LA DIMENSION: 3.3 CM
BH CV ECHO MEAS - LAT PEAK E' VEL: 6.7 CM/SEC
BH CV ECHO MEAS - LV DIASTOLIC VOL/BSA (35-75): 27.1 CM2
BH CV ECHO MEAS - LV MASS(C)D: 103.8 GRAMS
BH CV ECHO MEAS - LV MAX PG: 3.3 MMHG
BH CV ECHO MEAS - LV MEAN PG: 1.41 MMHG
BH CV ECHO MEAS - LV SYSTOLIC VOL/BSA (12-30): 9.6 CM2
BH CV ECHO MEAS - LV V1 MAX: 90.7 CM/SEC
BH CV ECHO MEAS - LV V1 VTI: 18.7 CM
BH CV ECHO MEAS - LVIDD: 3.4 CM
BH CV ECHO MEAS - LVIDS: 2.42 CM
BH CV ECHO MEAS - LVOT AREA: 3.1 CM2
BH CV ECHO MEAS - LVOT DIAM: 1.99 CM
BH CV ECHO MEAS - LVPWD: 1.19 CM
BH CV ECHO MEAS - MED PEAK E' VEL: 6.5 CM/SEC
BH CV ECHO MEAS - MV A DUR: 0.12 SEC
BH CV ECHO MEAS - MV A MAX VEL: 102.8 CM/SEC
BH CV ECHO MEAS - MV DEC SLOPE: 430.1 CM/SEC2
BH CV ECHO MEAS - MV DEC TIME: 222 MSEC
BH CV ECHO MEAS - MV E MAX VEL: 81.5 CM/SEC
BH CV ECHO MEAS - MV E/A: 0.79
BH CV ECHO MEAS - MV MAX PG: 5.7 MMHG
BH CV ECHO MEAS - MV MEAN PG: 2.44 MMHG
BH CV ECHO MEAS - MV P1/2T: 76.7 MSEC
BH CV ECHO MEAS - MV V2 VTI: 28.2 CM
BH CV ECHO MEAS - MVA(P1/2T): 2.9 CM2
BH CV ECHO MEAS - MVA(VTI): 2.07 CM2
BH CV ECHO MEAS - PA ACC TIME: 0.14 SEC
BH CV ECHO MEAS - PA PR(ACCEL): 17.2 MMHG
BH CV ECHO MEAS - PA V2 MAX: 86.9 CM/SEC
BH CV ECHO MEAS - PI END-D VEL: 92.3 CM/SEC
BH CV ECHO MEAS - PULM A REVS DUR: 0.13 SEC
BH CV ECHO MEAS - PULM A REVS VEL: 28.6 CM/SEC
BH CV ECHO MEAS - PULM DIAS VEL: 38.3 CM/SEC
BH CV ECHO MEAS - PULM S/D: 1.39
BH CV ECHO MEAS - PULM SYS VEL: 53.4 CM/SEC
BH CV ECHO MEAS - QP/QS: 2.1
BH CV ECHO MEAS - RV MAX PG: 1.57 MMHG
BH CV ECHO MEAS - RV V1 MAX: 62.6 CM/SEC
BH CV ECHO MEAS - RV V1 VTI: 15.2 CM
BH CV ECHO MEAS - RVDD: 3.1 CM
BH CV ECHO MEAS - RVOT DIAM: 3.2 CM
BH CV ECHO MEAS - SI(MOD-SP2): 12.4 ML/M2
BH CV ECHO MEAS - SI(MOD-SP4): 17.5 ML/M2
BH CV ECHO MEAS - SV(LVOT): 58.2 ML
BH CV ECHO MEAS - SV(MOD-SP2): 22 ML
BH CV ECHO MEAS - SV(MOD-SP4): 31 ML
BH CV ECHO MEAS - SV(RVOT): 122.4 ML
BH CV ECHO MEAS - TAPSE (>1.6): 2.6 CM
BH CV ECHO MEASUREMENTS AVERAGE E/E' RATIO: 12.35
BH CV XLRA - RV BASE: 2.5 CM
BH CV XLRA - RV LENGTH: 6.9 CM
BH CV XLRA - RV MID: 2.21 CM
LEFT ATRIUM VOLUME INDEX: 21 ML/M2
MAXIMAL PREDICTED HEART RATE: 139 BPM
SINUS: 2.28 CM
STJ: 2.16 CM
STRESS TARGET HR: 118 BPM

## 2023-05-18 PROCEDURE — 93306 TTE W/DOPPLER COMPLETE: CPT | Performed by: INTERNAL MEDICINE

## 2023-05-18 PROCEDURE — 99213 OFFICE O/P EST LOW 20 MIN: CPT | Performed by: INTERNAL MEDICINE

## 2023-05-18 PROCEDURE — 93306 TTE W/DOPPLER COMPLETE: CPT

## 2023-05-18 PROCEDURE — 3077F SYST BP >= 140 MM HG: CPT | Performed by: INTERNAL MEDICINE

## 2023-05-18 PROCEDURE — 3078F DIAST BP <80 MM HG: CPT | Performed by: INTERNAL MEDICINE

## 2023-05-18 RX ORDER — QUETIAPINE FUMARATE 25 MG/1
25 TABLET, FILM COATED ORAL NIGHTLY
COMMUNITY

## 2023-05-18 NOTE — PROGRESS NOTES
6 MO FOLLOW UP WITH ECHO  NEEDS REFILLS OF PLAVIX AND ISOSORBIDE   Subjective:        Griffin Angeles is a 82 y.o. male who here for follow up    CC  Follow up cad  HPI  Follow up cad, htn     Problems Addressed this Visit        Cardiac and Vasculature    Coronary artery disease involving native coronary artery of native heart without angina pectoris - Primary    Hyperlipidemia    Essential hypertension   Diagnoses       Codes Comments    Coronary artery disease involving native coronary artery of native heart without angina pectoris    -  Primary ICD-10-CM: I25.10  ICD-9-CM: 414.01     Essential hypertension     ICD-10-CM: I10  ICD-9-CM: 401.9     Hyperlipidemia, unspecified hyperlipidemia type     ICD-10-CM: E78.5  ICD-9-CM: 272.4         .    The following portions of the patient's history were reviewed and updated as appropriate: allergies, current medications, past family history, past medical history, past social history, past surgical history and problem list.    Past Medical History:   Diagnosis Date   • BPH (benign prostatic hypertrophy)    • CAD (coronary artery disease)    • Dementia    • Fall 05/2022   • Hydrocephalus    • Hyperlipidemia    • Hyperparathyroidism    • Hypertension    • Hypothyroidism    • Lacunar infarction     Bilateral basal ganglia   • Parkinsonism    • Postherpetic neuralgia    • Shingles 06/2018   • Stroke    • Type 2 diabetes mellitus      reports that he quit smoking about 37 years ago. His smoking use included pipe. He has never been exposed to tobacco smoke. He has never used smokeless tobacco. He reports that he does not currently use alcohol. He reports that he does not use drugs.   Family History   Problem Relation Age of Onset   • Diabetes Mother    • Thyroid disease Mother    • Stroke Mother    • Diabetes Father    • Kidney failure Father    • Heart disease Sister        Review of Systems  Constitutional: No wt loss, fever, fatigue  Gastrointestinal: No nausea, abdominal  "pain  Behavioral/Psych: No insomnia or anxiety   Cardiovascular no cp  Objective:       Physical Exam  /73   Pulse 56   Ht 171.5 cm (67.5\")   BMI 22.68 kg/m²   General appearance: No acute changes   Neck: Trachea midline; NECK, supple, no thyromegaly or lymphadenopathy   Lungs: Normal size and shape, normal breath sounds, equal distribution of air, no rales and rhonchi   CV: S1-S2 regular, no murmurs, no rub, no gallop   Abdomen: Soft, nontender; no masses , no abnormal abdominal sounds   Extremities: No deformity , normal color , no peripheral edema   Skin: Normal temperature, turgor and texture; no rash, ulcers          Procedures      Echocardiogram:        Current Outpatient Medications:   •  ACCU-CHEK SOFTCLIX LANCETS lancets, Dx code E10.8 testing bs 3 x day, Disp: 300 each, Rfl: 1  •  atorvastatin (LIPITOR) 20 MG tablet, Take 1 tablet by mouth Daily., Disp: , Rfl:   •  BD Pen Needle Aydee 2nd Gen 32G X 4 MM misc, USE AS DIRECTED 4 TIMES A  DAY, Disp: 400 each, Rfl: 2  •  carbidopa-levodopa (SINEMET)  MG per tablet, Take 2 tablets by mouth 3 (Three) Times a Day With Meals. (Patient taking differently: Take 2 tablets by mouth 2 (Two) Times a Day.), Disp: , Rfl:   •  Cholecalciferol (VITAMIN D3) 1000 units capsule, Take 1 capsule by mouth Daily., Disp: , Rfl:   •  clopidogrel (PLAVIX) 75 MG tablet, TAKE 1 TABLET DAILY, Disp: 90 tablet, Rfl: 1  •  donepezil (ARICEPT) 10 MG tablet, TAKE 1 TABLET BY MOUTH ONCE DAILY AT NIGHT, Disp: 90 tablet, Rfl: 1  •  escitalopram (LEXAPRO) 20 MG tablet, , Disp: , Rfl:   •  fluticasone (FLONASE) 50 MCG/ACT nasal spray, 2 sprays into the nostril(s) as directed by provider Daily As Needed., Disp: , Rfl:   •  insulin degludec (Tresiba FlexTouch) 100 UNIT/ML solution pen-injector injection, 8 units every morning, Disp: 15 mL, Rfl: 2  •  Insulin Syringe-Needle U-100 (RELION INSULIN SYRINGE) 31G X 15/64\" 0.5 ML misc, Using 3 syringes daily, Disp: 300 each, Rfl: 1  •  " isosorbide mononitrate (IMDUR) 30 MG 24 hr tablet, TAKE ONE TABLET BY MOUTH DAILY, Disp: 90 tablet, Rfl: 1  •  levothyroxine (SYNTHROID, LEVOTHROID) 88 MCG tablet, TAKE 1 TABLET DAILY, Disp: 90 tablet, Rfl: 1  •  losartan (COZAAR) 50 MG tablet, Take 1 tablet by mouth Daily., Disp: , Rfl:   •  metoprolol succinate XL (TOPROL-XL) 25 MG 24 hr tablet, 1 tablet Daily. 1/2 tablet daily, Disp: , Rfl: 3  •  NovoLOG FlexPen 100 UNIT/ML solution pen-injector sc pen, Inject 9-10 Units under the skin into the appropriate area as directed 3 (Three) Times a Day With Meals., Disp: 9 mL, Rfl: 3  •  QUEtiapine (SEROquel) 25 MG tablet, Take 1 tablet by mouth Every Night., Disp: , Rfl:   •  tamsulosin (FLOMAX) 0.4 MG capsule 24 hr capsule, 2 capsules Daily., Disp: , Rfl:   •  Continuous Blood Gluc Sensor (FreeStyle Dione 2 Sensor) misc, 1 each Every 14 (Fourteen) Days. Use every 14 days.  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 Coronary artery disease involving native coronary artery of native heart without angina pectoris  I25.10  414.01   2. Essential hypertension  I10 401.9   3. Hyperlipidemia, unspecified hyperlipidemia type  E78.5 272.4     1. Coronary artery disease involving native coronary artery of native heart without angina pectoris  No angina    2. Essential hypertension  bp controlled    3. Hyperlipidemia, unspecified hyperlipidemia type  Con same       1 yr  COUNSELING:    Griffin Eisenberg was given to patient for the following topics: diagnostic results, risk factor reductions, impressions, risks and benefits of treatment options and importance of treatment compliance .       SMOKING COUNSELING:        Dictated using Dragon dictation

## 2023-05-26 DIAGNOSIS — E10.29 TYPE 1 DIABETES MELLITUS WITH PROTEINURIA: ICD-10-CM

## 2023-05-26 DIAGNOSIS — R80.9 TYPE 1 DIABETES MELLITUS WITH PROTEINURIA: ICD-10-CM

## 2023-05-26 RX ORDER — INSULIN ASPART 100 [IU]/ML
9-10 INJECTION, SOLUTION INTRAVENOUS; SUBCUTANEOUS
Qty: 9 ML | Refills: 3 | OUTPATIENT
Start: 2023-05-26

## 2023-05-26 NOTE — TELEPHONE ENCOUNTER
Caller: Zohra Angeles    Relationship: Emergency Contact    Best call back number: [phone]    Requested Prescriptions:   Requested Prescriptions     Pending Prescriptions Disp Refills   • NovoLOG FlexPen 100 UNIT/ML solution pen-injector sc pen 9 mL 3     Sig: Inject 9-10 Units under the skin into the appropriate area as directed 3 (Three) Times a Day With Meals.        Pharmacy where request should be sent:  PTS SPOUSE STATED TO SEND TO [facility] [place], [place]  [phone]    Last office visit with prescribing clinician: 8/26/2022   Last telemedicine visit with prescribing clinician: Visit date not found   Next office visit with prescribing clinician: 8/9/2023     Additional details provided by patient: PT SPOUSE WOULD LIKE A CALL BACK TO VERIFY REFILL HAS BEEN SENT TO THE CORRECT [facility].    Does the patient have less than a 3 day supply:  [x] Yes  [] No    Would you like a call back once the refill request has been completed: [x] Yes [] No    If the office needs to give you a call back, can they leave a voicemail: [x] Yes [] No    Axel Molina Rep   05/26/23 13:34 EDT

## 2023-05-30 RX ORDER — INSULIN ASPART 100 [IU]/ML
9-10 INJECTION, SOLUTION INTRAVENOUS; SUBCUTANEOUS
Qty: 9 ML | Refills: 3 | Status: SHIPPED | OUTPATIENT
Start: 2023-05-30

## 2023-06-06 RX ORDER — LEVOTHYROXINE SODIUM 88 UG/1
88 TABLET ORAL DAILY
Qty: 90 TABLET | Refills: 1 | Status: SHIPPED | OUTPATIENT
Start: 2023-06-06

## 2023-06-11 ENCOUNTER — HOSPITAL ENCOUNTER (OUTPATIENT)
Facility: HOSPITAL | Age: 83
Discharge: HOME | End: 2023-06-12
Attending: EMERGENCY MEDICINE | Admitting: EMERGENCY MEDICINE
Payer: MEDICARE

## 2023-06-11 ENCOUNTER — APPOINTMENT (OUTPATIENT)
Dept: GENERAL RADIOLOGY | Facility: HOSPITAL | Age: 83
End: 2023-06-11
Payer: MEDICARE

## 2023-06-11 ENCOUNTER — APPOINTMENT (OUTPATIENT)
Dept: CT IMAGING | Facility: HOSPITAL | Age: 83
End: 2023-06-11
Payer: MEDICARE

## 2023-06-11 DIAGNOSIS — R53.1 WEAKNESS: Primary | ICD-10-CM

## 2023-06-11 DIAGNOSIS — I10 HYPERTENSION, UNSPECIFIED TYPE: ICD-10-CM

## 2023-06-11 DIAGNOSIS — E11.9 TYPE 2 DIABETES MELLITUS WITHOUT COMPLICATION, UNSPECIFIED WHETHER LONG TERM INSULIN USE: ICD-10-CM

## 2023-06-11 DIAGNOSIS — N18.9 ACUTE RENAL FAILURE SUPERIMPOSED ON CHRONIC KIDNEY DISEASE, UNSPECIFIED CKD STAGE, UNSPECIFIED ACUTE RENAL FAILURE TYPE: ICD-10-CM

## 2023-06-11 DIAGNOSIS — N17.9 ACUTE RENAL FAILURE SUPERIMPOSED ON CHRONIC KIDNEY DISEASE, UNSPECIFIED CKD STAGE, UNSPECIFIED ACUTE RENAL FAILURE TYPE: ICD-10-CM

## 2023-06-11 DIAGNOSIS — F02.80 DEMENTIA DUE TO PARKINSON'S DISEASE, WITHOUT BEHAVIORAL DISTURBANCE, PSYCHOTIC DISTURBANCE, MOOD DISTURBANCE, OR ANXIETY, UNSPECIFIED DEMENTIA SEVERITY: ICD-10-CM

## 2023-06-11 DIAGNOSIS — G20 DEMENTIA DUE TO PARKINSON'S DISEASE, WITHOUT BEHAVIORAL DISTURBANCE, PSYCHOTIC DISTURBANCE, MOOD DISTURBANCE, OR ANXIETY, UNSPECIFIED DEMENTIA SEVERITY: ICD-10-CM

## 2023-06-11 DIAGNOSIS — E78.5 HYPERLIPIDEMIA, UNSPECIFIED HYPERLIPIDEMIA TYPE: ICD-10-CM

## 2023-06-11 LAB
ALBUMIN SERPL-MCNC: 3.3 G/DL (ref 3.5–5.2)
ALBUMIN/GLOB SERPL: 1.1 G/DL
ALP SERPL-CCNC: 366 U/L (ref 39–117)
ALT SERPL W P-5'-P-CCNC: 14 U/L (ref 1–41)
ANION GAP SERPL CALCULATED.3IONS-SCNC: 6 MMOL/L (ref 5–15)
AST SERPL-CCNC: 14 U/L (ref 1–40)
BACTERIA UR QL AUTO: ABNORMAL /HPF
BASOPHILS # BLD AUTO: 0.03 10*3/MM3 (ref 0–0.2)
BASOPHILS NFR BLD AUTO: 0.3 % (ref 0–1.5)
BILIRUB SERPL-MCNC: 0.2 MG/DL (ref 0–1.2)
BILIRUB UR QL STRIP: NEGATIVE
BUN SERPL-MCNC: 29 MG/DL (ref 8–23)
BUN/CREAT SERPL: 19.7 (ref 7–25)
CALCIUM SPEC-SCNC: 8.6 MG/DL (ref 8.6–10.5)
CHLORIDE SERPL-SCNC: 107 MMOL/L (ref 98–107)
CLARITY UR: CLEAR
CO2 SERPL-SCNC: 29 MMOL/L (ref 22–29)
COLOR UR: YELLOW
CREAT SERPL-MCNC: 1.47 MG/DL (ref 0.76–1.27)
DEPRECATED RDW RBC AUTO: 43.6 FL (ref 37–54)
EGFRCR SERPLBLD CKD-EPI 2021: 47.6 ML/MIN/1.73
EOSINOPHIL # BLD AUTO: 0.22 10*3/MM3 (ref 0–0.4)
EOSINOPHIL NFR BLD AUTO: 2.5 % (ref 0.3–6.2)
ERYTHROCYTE [DISTWIDTH] IN BLOOD BY AUTOMATED COUNT: 12.9 % (ref 12.3–15.4)
GLOBULIN UR ELPH-MCNC: 2.9 GM/DL
GLUCOSE SERPL-MCNC: 177 MG/DL (ref 65–99)
GLUCOSE UR STRIP-MCNC: NEGATIVE MG/DL
HCT VFR BLD AUTO: 38.2 % (ref 37.5–51)
HGB BLD-MCNC: 12.8 G/DL (ref 13–17.7)
HGB UR QL STRIP.AUTO: ABNORMAL
HYALINE CASTS UR QL AUTO: ABNORMAL /LPF
IMM GRANULOCYTES # BLD AUTO: 0.02 10*3/MM3 (ref 0–0.05)
IMM GRANULOCYTES NFR BLD AUTO: 0.2 % (ref 0–0.5)
INR PPP: 1 (ref 0.9–1.1)
KETONES UR QL STRIP: NEGATIVE
LEUKOCYTE ESTERASE UR QL STRIP.AUTO: ABNORMAL
LYMPHOCYTES # BLD AUTO: 1.34 10*3/MM3 (ref 0.7–3.1)
LYMPHOCYTES NFR BLD AUTO: 15.2 % (ref 19.6–45.3)
MAGNESIUM SERPL-MCNC: 1.8 MG/DL (ref 1.6–2.4)
MCH RBC QN AUTO: 30.5 PG (ref 26.6–33)
MCHC RBC AUTO-ENTMCNC: 33.5 G/DL (ref 31.5–35.7)
MCV RBC AUTO: 91 FL (ref 79–97)
MONOCYTES # BLD AUTO: 0.69 10*3/MM3 (ref 0.1–0.9)
MONOCYTES NFR BLD AUTO: 7.8 % (ref 5–12)
NEUTROPHILS NFR BLD AUTO: 6.51 10*3/MM3 (ref 1.7–7)
NEUTROPHILS NFR BLD AUTO: 74 % (ref 42.7–76)
NITRITE UR QL STRIP: NEGATIVE
NRBC BLD AUTO-RTO: 0 /100 WBC (ref 0–0.2)
PH UR STRIP.AUTO: 6 [PH] (ref 5–8)
PLATELET # BLD AUTO: 181 10*3/MM3 (ref 140–450)
PMV BLD AUTO: 12.7 FL (ref 6–12)
POTASSIUM SERPL-SCNC: 5.5 MMOL/L (ref 3.5–5.2)
PROT SERPL-MCNC: 6.2 G/DL (ref 6–8.5)
PROT UR QL STRIP: ABNORMAL
PROTHROMBIN TIME: 13.3 SECONDS (ref 11.7–14.2)
RBC # BLD AUTO: 4.2 10*6/MM3 (ref 4.14–5.8)
RBC # UR STRIP: ABNORMAL /HPF
REF LAB TEST METHOD: ABNORMAL
SODIUM SERPL-SCNC: 142 MMOL/L (ref 136–145)
SP GR UR STRIP: 1.01 (ref 1–1.03)
SQUAMOUS #/AREA URNS HPF: ABNORMAL /HPF
TROPONIN T SERPL HS-MCNC: 21 NG/L
TSH SERPL DL<=0.05 MIU/L-ACNC: 1.56 UIU/ML (ref 0.27–4.2)
UROBILINOGEN UR QL STRIP: ABNORMAL
WBC # UR STRIP: ABNORMAL /HPF
WBC NRBC COR # BLD: 8.81 10*3/MM3 (ref 3.4–10.8)

## 2023-06-11 PROCEDURE — 87186 SC STD MICRODIL/AGAR DIL: CPT | Performed by: PHYSICIAN ASSISTANT

## 2023-06-11 PROCEDURE — 70450 CT HEAD/BRAIN W/O DYE: CPT

## 2023-06-11 PROCEDURE — 85025 COMPLETE CBC W/AUTO DIFF WBC: CPT | Performed by: PHYSICIAN ASSISTANT

## 2023-06-11 PROCEDURE — 87086 URINE CULTURE/COLONY COUNT: CPT | Performed by: PHYSICIAN ASSISTANT

## 2023-06-11 PROCEDURE — 99285 EMERGENCY DEPT VISIT HI MDM: CPT

## 2023-06-11 PROCEDURE — 83735 ASSAY OF MAGNESIUM: CPT | Performed by: PHYSICIAN ASSISTANT

## 2023-06-11 PROCEDURE — 93005 ELECTROCARDIOGRAM TRACING: CPT | Performed by: PHYSICIAN ASSISTANT

## 2023-06-11 PROCEDURE — 87077 CULTURE AEROBIC IDENTIFY: CPT | Performed by: PHYSICIAN ASSISTANT

## 2023-06-11 PROCEDURE — 80053 COMPREHEN METABOLIC PANEL: CPT | Performed by: PHYSICIAN ASSISTANT

## 2023-06-11 PROCEDURE — 81001 URINALYSIS AUTO W/SCOPE: CPT | Performed by: PHYSICIAN ASSISTANT

## 2023-06-11 PROCEDURE — 84484 ASSAY OF TROPONIN QUANT: CPT | Performed by: PHYSICIAN ASSISTANT

## 2023-06-11 PROCEDURE — 84443 ASSAY THYROID STIM HORMONE: CPT | Performed by: PHYSICIAN ASSISTANT

## 2023-06-11 PROCEDURE — 85610 PROTHROMBIN TIME: CPT | Performed by: PHYSICIAN ASSISTANT

## 2023-06-11 PROCEDURE — 71045 X-RAY EXAM CHEST 1 VIEW: CPT

## 2023-06-11 RX ORDER — SODIUM CHLORIDE 0.9 % (FLUSH) 0.9 %
10 SYRINGE (ML) INJECTION AS NEEDED
Status: DISCONTINUED | OUTPATIENT
Start: 2023-06-11 | End: 2023-06-12 | Stop reason: HOSPADM

## 2023-06-11 RX ADMIN — SODIUM CHLORIDE 500 ML: 9 INJECTION, SOLUTION INTRAVENOUS at 21:26

## 2023-06-11 NOTE — ED NOTES
Patient to ED via EMS stretcher with complaints of bilateral leg weakness since approx 8am.  Patient normally can stand and get himself to wheelchair but unable to do so since this am.  Patient from home AA&Ox4.

## 2023-06-12 ENCOUNTER — READMISSION MANAGEMENT (OUTPATIENT)
Dept: CALL CENTER | Facility: HOSPITAL | Age: 83
End: 2023-06-12
Payer: MEDICARE

## 2023-06-12 ENCOUNTER — APPOINTMENT (OUTPATIENT)
Dept: MRI IMAGING | Facility: HOSPITAL | Age: 83
End: 2023-06-12
Payer: MEDICARE

## 2023-06-12 VITALS
HEIGHT: 67 IN | HEART RATE: 75 BPM | WEIGHT: 140 LBS | OXYGEN SATURATION: 99 % | DIASTOLIC BLOOD PRESSURE: 72 MMHG | SYSTOLIC BLOOD PRESSURE: 178 MMHG | RESPIRATION RATE: 16 BRPM | BODY MASS INDEX: 21.97 KG/M2 | TEMPERATURE: 97.4 F

## 2023-06-12 PROBLEM — G45.9 TIA (TRANSIENT ISCHEMIC ATTACK): Status: ACTIVE | Noted: 2023-06-12

## 2023-06-12 LAB
ALBUMIN SERPL-MCNC: 3.3 G/DL (ref 3.5–5.2)
ALBUMIN/GLOB SERPL: 1.2 G/DL
ALP SERPL-CCNC: 355 U/L (ref 39–117)
ALT SERPL W P-5'-P-CCNC: 14 U/L (ref 1–41)
ANION GAP SERPL CALCULATED.3IONS-SCNC: 8 MMOL/L (ref 5–15)
AST SERPL-CCNC: 11 U/L (ref 1–40)
BILIRUB SERPL-MCNC: 0.3 MG/DL (ref 0–1.2)
BUN SERPL-MCNC: 28 MG/DL (ref 8–23)
BUN/CREAT SERPL: 23.5 (ref 7–25)
CALCIUM SPEC-SCNC: 8.5 MG/DL (ref 8.6–10.5)
CHLORIDE SERPL-SCNC: 110 MMOL/L (ref 98–107)
CHOLEST SERPL-MCNC: 107 MG/DL (ref 0–200)
CO2 SERPL-SCNC: 26 MMOL/L (ref 22–29)
CREAT SERPL-MCNC: 1.19 MG/DL (ref 0.76–1.27)
EGFRCR SERPLBLD CKD-EPI 2021: 61.4 ML/MIN/1.73
GEN 5 2HR TROPONIN T REFLEX: 22 NG/L
GLOBULIN UR ELPH-MCNC: 2.7 GM/DL
GLUCOSE BLDC GLUCOMTR-MCNC: 125 MG/DL (ref 70–130)
GLUCOSE BLDC GLUCOMTR-MCNC: 153 MG/DL (ref 70–130)
GLUCOSE SERPL-MCNC: 127 MG/DL (ref 65–99)
HBA1C MFR BLD: 7.4 % (ref 4.8–5.6)
HDLC SERPL-MCNC: 53 MG/DL (ref 40–60)
LDLC SERPL CALC-MCNC: 38 MG/DL (ref 0–100)
LDLC/HDLC SERPL: 0.72 {RATIO}
POTASSIUM SERPL-SCNC: 4.1 MMOL/L (ref 3.5–5.2)
PROT SERPL-MCNC: 6 G/DL (ref 6–8.5)
QT INTERVAL: 369 MS
SODIUM SERPL-SCNC: 144 MMOL/L (ref 136–145)
TRIGL SERPL-MCNC: 78 MG/DL (ref 0–150)
TROPONIN T DELTA: 1 NG/L
VLDLC SERPL-MCNC: 16 MG/DL (ref 5–40)

## 2023-06-12 PROCEDURE — 92610 EVALUATE SWALLOWING FUNCTION: CPT

## 2023-06-12 PROCEDURE — 70551 MRI BRAIN STEM W/O DYE: CPT

## 2023-06-12 PROCEDURE — 84484 ASSAY OF TROPONIN QUANT: CPT | Performed by: PHYSICIAN ASSISTANT

## 2023-06-12 PROCEDURE — A9270 NON-COVERED ITEM OR SERVICE: HCPCS | Performed by: NURSE PRACTITIONER

## 2023-06-12 PROCEDURE — G0378 HOSPITAL OBSERVATION PER HR: HCPCS

## 2023-06-12 PROCEDURE — 36415 COLL VENOUS BLD VENIPUNCTURE: CPT

## 2023-06-12 PROCEDURE — 80053 COMPREHEN METABOLIC PANEL: CPT | Performed by: NURSE PRACTITIONER

## 2023-06-12 PROCEDURE — 80061 LIPID PANEL: CPT | Performed by: NURSE PRACTITIONER

## 2023-06-12 PROCEDURE — 99223 1ST HOSP IP/OBS HIGH 75: CPT | Performed by: PSYCHIATRY & NEUROLOGY

## 2023-06-12 PROCEDURE — 83036 HEMOGLOBIN GLYCOSYLATED A1C: CPT | Performed by: NURSE PRACTITIONER

## 2023-06-12 PROCEDURE — 63710000001 ASPIRIN 325 MG TABLET: Performed by: NURSE PRACTITIONER

## 2023-06-12 PROCEDURE — 82948 REAGENT STRIP/BLOOD GLUCOSE: CPT

## 2023-06-12 RX ORDER — SODIUM CHLORIDE 9 MG/ML
75 INJECTION, SOLUTION INTRAVENOUS CONTINUOUS
Status: DISCONTINUED | OUTPATIENT
Start: 2023-06-12 | End: 2023-06-12 | Stop reason: HOSPADM

## 2023-06-12 RX ORDER — INSULIN LISPRO 100 [IU]/ML
2-9 INJECTION, SOLUTION INTRAVENOUS; SUBCUTANEOUS
Status: DISCONTINUED | OUTPATIENT
Start: 2023-06-12 | End: 2023-06-12 | Stop reason: HOSPADM

## 2023-06-12 RX ORDER — ASPIRIN 325 MG
325 TABLET ORAL DAILY
Status: DISCONTINUED | OUTPATIENT
Start: 2023-06-12 | End: 2023-06-12 | Stop reason: HOSPADM

## 2023-06-12 RX ORDER — ATORVASTATIN CALCIUM 20 MG/1
40 TABLET, FILM COATED ORAL NIGHTLY
Status: DISCONTINUED | OUTPATIENT
Start: 2023-06-12 | End: 2023-06-12 | Stop reason: HOSPADM

## 2023-06-12 RX ORDER — NICOTINE POLACRILEX 4 MG
15 LOZENGE BUCCAL
Status: DISCONTINUED | OUTPATIENT
Start: 2023-06-12 | End: 2023-06-12 | Stop reason: HOSPADM

## 2023-06-12 RX ORDER — NITROGLYCERIN 0.4 MG/1
0.4 TABLET SUBLINGUAL
Status: DISCONTINUED | OUTPATIENT
Start: 2023-06-12 | End: 2023-06-12 | Stop reason: HOSPADM

## 2023-06-12 RX ORDER — ONDANSETRON 2 MG/ML
4 INJECTION INTRAMUSCULAR; INTRAVENOUS EVERY 6 HOURS PRN
Status: DISCONTINUED | OUTPATIENT
Start: 2023-06-12 | End: 2023-06-12 | Stop reason: HOSPADM

## 2023-06-12 RX ORDER — DEXTROSE MONOHYDRATE 25 G/50ML
25 INJECTION, SOLUTION INTRAVENOUS
Status: DISCONTINUED | OUTPATIENT
Start: 2023-06-12 | End: 2023-06-12 | Stop reason: HOSPADM

## 2023-06-12 RX ORDER — IBUPROFEN 600 MG/1
1 TABLET ORAL
Status: DISCONTINUED | OUTPATIENT
Start: 2023-06-12 | End: 2023-06-12 | Stop reason: HOSPADM

## 2023-06-12 RX ORDER — ASPIRIN 300 MG/1
300 SUPPOSITORY RECTAL DAILY
Status: DISCONTINUED | OUTPATIENT
Start: 2023-06-12 | End: 2023-06-12 | Stop reason: HOSPADM

## 2023-06-12 RX ADMIN — SODIUM CHLORIDE 75 ML/HR: 9 INJECTION, SOLUTION INTRAVENOUS at 04:16

## 2023-06-12 RX ADMIN — ASPIRIN 325 MG: 325 TABLET ORAL at 11:18

## 2023-06-12 NOTE — THERAPY EVALUATION
Acute Care - Speech Language Pathology   Swallow Initial Evaluation [facility]     Patient Name: Griffin Angeles  : 1940  MRN: [id number]  Today's Date: 2023               Admit Date: 2023    Visit Dx:     ICD-10-CM ICD-9-CM   1. Weakness  R53.1 780.79   2. Dementia due to Parkinson's disease, without behavioral disturbance, psychotic disturbance, mood disturbance, or anxiety, unspecified dementia severity  G20 332.0    F02.80 294.10   3. Acute renal failure superimposed on chronic kidney disease, unspecified CKD stage, unspecified acute renal failure type  N17.9 584.9    N18.9 585.9   4. Hypertension, unspecified type  I10 401.9   5. Hyperlipidemia, unspecified hyperlipidemia type  E78.5 272.4   6. 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    Type 1 diabetes mellitus with proteinuria    Chronic nonspecific colitis    Multiple lacunar infarcts    Type 1 diabetes mellitus with complications    Cholesterol retinal embolus of left eye    Parkinson's disease    Fall at home    Vascular dementia    History of CVA (cerebrovascular accident)    Cerebral ventriculomegaly    TIA (transient ischemic attack)     Past Medical History:   Diagnosis Date    BPH (benign prostatic hypertrophy)     CAD (coronary artery disease)     Dementia     Fall 2022    Hydrocephalus     Hyperlipidemia     Hyperparathyroidism     Hypertension     Hypothyroidism     Lacunar  infarction     Bilateral basal ganglia    Parkinsonism     Postherpetic neuralgia     Shingles 06/2018    Stroke     Type 2 diabetes mellitus      Past Surgical History:   Procedure Laterality Date    CATARACT EXTRACTION Bilateral 08/12/2017    COLONOSCOPY  08/16/2017    Chronic inflammation.  Dr. Benoit.  [facility]    CORONARY ANGIOPLASTY WITH STENT PLACEMENT      HEMORRHOIDECTOMY      PARATHYROIDECTOMY      PENILE PROSTHESIS IMPLANT  2016    Dr. [facility] at [facility]    RETINAL LASER PROCEDURE Right     TURP / TRANSURETHRAL INCISION / DRAINAGE PROSTATE         SLP Recommendation and Plan  SLP Swallowing Diagnosis: suspected pharyngeal dysphagia (06/12/23 1100)  SLP Diet Recommendation: regular textures, thin liquids, no mixed consistencies (06/12/23 1100)  Recommended Precautions and Strategies: upright posture during/after eating, small bites of food and sips of liquid, other (see comments) (slow rate) (06/12/23 1100)  SLP Rec. for Method of Medication Administration: meds whole, with puree, as tolerated (06/12/23 1100)     Monitor for Signs of Aspiration: yes, notify SLP if any concerns (06/12/23 1100)  Recommended Diagnostics: reassess via clinical swallow evaluation (06/12/23 1100)  Swallow Criteria for Skilled Therapeutic Interventions Met: demonstrates skilled criteria (06/12/23 1100)  Anticipated Discharge Disposition (SLP): home with assist (06/12/23 1100)  Rehab Potential/Prognosis, Swallowing: good, to achieve stated therapy goals (06/12/23 1100)  Therapy Frequency (Swallow): PRN (06/12/23 1100)  Predicted Duration Therapy Intervention (Days): until discharge (06/12/23 1100)                                        Plan of Care Reviewed With: patient, spouse  Outcome Evaluation: Clinical swallow evaluation completed. No overt s/s of aspiration with ice chips, thin liquid by spoon/cup/straw, puree, soft/chopped solids, or regular solids. Wet vocal quality following mixed consistency. Recommend  regular/thin liquid diet with no mixed. Meds whole with puree, as tolerated. Sitting upright, slow rate, small bites/sips.      SWALLOW EVALUATION (last 72 hours)       SLP Adult Swallow Evaluation       Row Name 06/12/23 1100                   Rehab Evaluation    Document Type evaluation  -CR        Subjective Information no complaints  -CR        Patient Observations alert;cooperative  -CR        Patient Effort good  -CR        Symptoms Noted During/After Treatment none  -CR           General Information    Patient Profile Reviewed yes  -CR        Pertinent History Of Current Problem 83 y/o male in observation unit due to left-sided weakness. H/o CVAs. MRI and CT head negative of acute changes.  -CR        Current Method of Nutrition NPO  -CR        Precautions/Limitations, Vision WFL;for purposes of eval  -CR        Precautions/Limitations, Hearing WFL;for purposes of eval  -CR        Prior Level of Function-Communication other (see comments)  dementia  -CR        Prior Level of Function-Swallowing no diet consistency restrictions;other (see comments)  Wife reports dysphagia in past, however, patient  -CR        Plans/Goals Discussed with patient;spouse/S.O.;agreed upon  -CR        Barriers to Rehab medically complex  -CR           Pain    Additional Documentation Pain Scale: FACES Pre/Post-Treatment (Group)  -CR           Pain Scale: FACES Pre/Post-Treatment    Pain: FACES Scale, Pretreatment 0-->no hurt  -CR        Posttreatment Pain Rating 0-->no hurt  -CR           Oral Motor Structure and Function    Dentition Assessment natural, present and adequate  -CR        Secretion Management WNL/WFL  -CR        Mucosal Quality moist, healthy  -CR           Oral Musculature and Cranial Nerve Assessment    Oral Motor General Assessment generalized oral motor weakness  -CR           General Eating/Swallowing Observations    Respiratory Support Currently in Use room air  -CR           Clinical Swallow Eval    Clinical  Swallow Evaluation Summary Clinical swallow evaluation completed. Wife at bedside reports previous dysphagia therapy years ago, however, patient tolerates regular and thin liquid diet in home setting. No overt s/s of aspiration with ice chips, thin liquid by spoon/cup/straw, puree, soft/chopped solids, and regular solids. Wet vocal quality with mixed consistency. Recommend regular solid and thin liquid diet with no mixed consistencies. Meds whole with puree, as tolerated. Sitting upright, slow rate, small bites/sips.  -CR           SLP Evaluation Clinical Impression    SLP Swallowing Diagnosis suspected pharyngeal dysphagia  -CR        Functional Impact risk of aspiration/pneumonia  -CR        Rehab Potential/Prognosis, Swallowing good, to achieve stated therapy goals  -CR        Swallow Criteria for Skilled Therapeutic Interventions Met demonstrates skilled criteria  -CR           Recommendations    Therapy Frequency (Swallow) PRN  -CR        Predicted Duration Therapy Intervention (Days) until discharge  -CR        SLP Diet Recommendation regular textures;thin liquids;no mixed consistencies  -CR        Recommended Diagnostics reassess via clinical swallow evaluation  -CR        Recommended Precautions and Strategies upright posture during/after eating;small bites of food and sips of liquid;other (see comments)  slow rate  -CR        Oral Care Recommendations Oral Care BID/PRN  -CR        SLP Rec. for Method of Medication Administration meds whole;with puree;as tolerated  -CR        Monitor for Signs of Aspiration yes;notify SLP if any concerns  -CR        Anticipated Discharge Disposition (SLP) home with assist  -CR           Swallow Goals (SLP)    Swallow LTGs Patient will demonstrate functional swallow for  -CR           (LTG) Patient will demonstrate functional swallow for    Diet Texture (Demonstrate functional swallow) regular textures  -CR        Liquid viscosity (Demonstrate functional swallow) thin liquids   -CR        Crook (Demonstrate functional swallow) independently (over 90% accuracy)  -CR        Time Frame (Demonstrate functional swallow) 1 week  -CR        Progress/Outcomes (Demonstrate functional swallow) new goal  -CR                  User Key  (r) = Recorded By, (t) = Taken By, (c) = Cosigned By      Initials Name Effective Dates    Mercedez Carrero CF-SLP 05/31/23 -                     EDUCATION  The patient has been educated in the following areas:   Dysphagia (Swallowing Impairment).        SLP GOALS       Row Name 06/12/23 1100             (LTG) Patient will demonstrate functional swallow for    Diet Texture (Demonstrate functional swallow) regular textures  -CR      Liquid viscosity (Demonstrate functional swallow) thin liquids  -CR      Crook (Demonstrate functional swallow) independently (over 90% accuracy)  -CR      Time Frame (Demonstrate functional swallow) 1 week  -CR      Progress/Outcomes (Demonstrate functional swallow) new goal  -CR                User Key  (r) = Recorded By, (t) = Taken By, (c) = Cosigned By      Initials Name Provider Type    Mercedez Carrero CF-SLP Speech and Language Pathologist                       Time Calculation:    Time Calculation- SLP       Row Name 06/12/23 1143             Time Calculation- SLP    SLP Start Time 1030  -CR      SLP Received On 06/12/23  -CR                User Key  (r) = Recorded By, (t) = Taken By, (c) = Cosigned By      Initials Name Provider Type    Mercedez Carrero CF-SLP Speech and Language Pathologist                    Therapy Charges for Today       Code Description Service Date Service Provider Modifiers Qty    [id number] HC ST EVAL ORAL PHARYNG SWALLOW 4 6/12/2023 Mercedez Ramires CF-SLP GN 1                 SARAH Quintanilla  6/12/2023

## 2023-06-12 NOTE — CONSULTS
Neurology Consult Note    Consult Date: 6/12/2023    Referring MD: Dr. Rausch    Reason for Consult I have been asked to see the patient in neurological consultation to render advice and opinion regarding possible stroke    Griffin Angeles is a 82 y.o. 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    Patient was brought to the hospital by his wife for left-sided weakness.  She reports that yesterday afternoon he developed a strong lean to the left side and was unable to participate in transfers as he normally would.  This resolved spontaneously overnight and he is currently back to his neurologic baseline.  No obvious triggering factors.  Patient has been somewhat hypertensive since admission.    He was diagnosed with Parkinson's disease a number of years ago.  He has had a fairly rapid decline in his gait over the last several years with associated stiffness and bradykinesia.  There was some concern early on for normal pressure hydrocephalus.  He was referred to a neurosurgeon who recommended against shunting as he felt that the patient was most likely a poor surgical candidate.  Patient never underwent a lumbar puncture.    He has followed previously with a movement disorders neurologist at the [facility].  Currently he is on a stable dose of Sinemet.    Past Medical History:   Diagnosis Date    BPH (benign prostatic hypertrophy)     CAD (coronary artery disease)     Dementia     Fall 05/2022    Hydrocephalus     Hyperlipidemia     Hyperparathyroidism     Hypertension     Hypothyroidism     Lacunar infarction     Bilateral basal ganglia    Parkinsonism     Postherpetic neuralgia     Shingles 06/2018    Stroke     Type 2 diabetes mellitus        Exam  /64 (BP Location: Right arm, Patient Position: Lying)   Pulse 64   Temp 97.5 °F (36.4 °C) (Oral)    "Resp 16   Ht 170.2 cm (67\")   Wt 63.5 kg (140 lb)   SpO2 98%   BMI 21.93 kg/m²   Gen: NAD, vitals reviewed  MS: oriented x3, recent/remote memory mildly impaired, normal attention/concentration, language intact, no neglect.  CN: visual acuity grossly normal, PERRL, EOMI, no facial droop, no dysarthria, mild hypomimia  Motor: 5/5 throughout upper and lower extremities, increased tone left greater than right with cogwheeling, no tremor, mild to moderate bradykinesia and hypokinesia  Gait: Can stand with significant assistance.  Tends to lean backwards, narrow based short shuffling gait.  Only able to take a few steps even with significant assistance.  No left lean    DATA:    Lab Results   Component Value Date    GLUCOSE 127 (H) 06/12/2023    CALCIUM 8.5 (L) 06/12/2023     06/12/2023    K 4.1 06/12/2023    CO2 26.0 06/12/2023     (H) 06/12/2023    BUN 28 (H) 06/12/2023    CREATININE 1.19 06/12/2023    EGFRIFAFRI 75 07/29/2021    EGFRIFNONA 65 07/29/2021    BCR 23.5 06/12/2023    ANIONGAP 8.0 06/12/2023     Lab Results   Component Value Date    WBC 8.81 06/11/2023    HGB 12.8 (L) 06/11/2023    HCT 38.2 06/11/2023    MCV 91.0 06/11/2023     06/11/2023       Lab review: Hemoglobin 12.8, BMP unremarkable    Imaging review: I personally reviewed his MRI brain performed this admission which is significant for severe hydrocephalus and white matter disease.  Ventricular width is about 52 mm.  This is increased somewhat from his CT in 2020.  Radiology report reviewed.    Diagnoses:  Hydrocephalus  Parkinsonism  Chronic stroke    Pre-stroke MRS: 3-4  NIHSS: 0    Comment: ?  Exacerbation of chronic stroke with transient left-sided lean yesterday.  Patient currently back to neurologic baseline.  MRI brain negative for any evidence of acute stroke.  There has been some concern for normal pressure hydrocephalus in the past.  I discussed this with his wife.  She is not in favor of a lumbar puncture at this " time and I agree given his degree of decline that this is very unlikely to be helpful.  He was evaluated by neurosurgery in the past for this and they recommended against  shunting.    PLAN:  Continue Plavix  Monitor blood pressure closely at home  Return to the hospital for recurrent signs or symptoms of stroke  Continue Sinemet.  I will arrange neurology follow-up with our office.

## 2023-06-12 NOTE — PROGRESS NOTES
ED OBSERVATION PROGRESS/DISCHARGE SUMMARY    Date of Admission: 6/11/2023   LOS: 0 days   PCP: Jeronimo Quevedo MD    Final Diagnosis Parkinson disease      Subjective     Hospital Outcome:   82-year-old  gentleman admitted to the ED observation unit for left-sided weakness that occurred yesterday witnessed by his wife.  He has a longstanding history and is followed by neurology for Parkinson disease.  Lab work-up yesterday showed an acute kidney injury with improvement of his creatinine this morning to 1.19.  His A1c is 7.40.  Urinalysis did not show any overt evidence of infection.  A CT head without contrast showed no acute intracranial findings with stable chronic hydrocephalus and MRI showed no acute infarction.  Patient was seen and evaluated by Dr. Nicolas with the neurology service and offered lumbar puncture which patient and family have declined.  They will follow-up with her neurologist as an outpatient.    ROS:  General: no fevers, chills  Respiratory: no cough, dyspnea  Cardiovascular: no chest pain, palpitations  Abdomen: No abdominal pain, nausea, vomiting, or diarrhea  Neurologic: No focal weakness    Objective   Physical Exam:  I have reviewed the vital signs.  Temp:  [97.5 °F (36.4 °C)-98.1 °F (36.7 °C)] 97.5 °F (36.4 °C)  Heart Rate:  [64-84] 64  Resp:  [16] 16  BP: (149-180)/(61-97) 180/64  General Appearance:    Alert, cooperative, no distress, chronically ill-appearing  Head:    Normocephalic, atraumatic  Eyes:    Sclerae anicteric  Neck:   Supple, no mass  Lungs: Clear to auscultation bilaterally, respirations unlabored  Heart: Regular rate and rhythm, S1 and S2 normal, no murmur, rub or gallop  Abdomen:  Soft, non-tender, bowel sounds active, nondistended  Extremities: No clubbing, cyanosis, or edema to lower extremities  Pulses:  2+ and symmetric in distal lower extremities  Skin: No rashes   Neurologic: Oriented x3, Normal strength to extremities    Results Review:    I have  reviewed the labs, radiology results and diagnostic studies.    Results from last 7 days   Lab Units 06/11/23 2127   WBC 10*3/mm3 8.81   HEMOGLOBIN g/dL 12.8*   HEMATOCRIT % 38.2   PLATELETS 10*3/mm3 181     Results from last 7 days   Lab Units 06/12/23  0346 06/11/23 2127   SODIUM mmol/L 144 142   POTASSIUM mmol/L 4.1 5.5*   CHLORIDE mmol/L 110* 107   CO2 mmol/L 26.0 29.0   BUN mg/dL 28* 29*   CREATININE mg/dL 1.19 1.47*   CALCIUM mg/dL 8.5* 8.6   BILIRUBIN mg/dL 0.3 0.2   ALK PHOS U/L 355* 366*   ALT (SGPT) U/L 14 14   AST (SGOT) U/L 11 14   GLUCOSE mg/dL 127* 177*     Imaging Results (Last 24 Hours)       Procedure Component Value Units Date/Time    MRI Brain Without Contrast [[id number]] Collected: 06/12/23 0759     Updated: 06/12/23 0759    Narrative:      MRI EXAMINATION OF THE BRAIN WITHOUT CONTRAST     HISTORY: Weakness. Parkinson's disease.     COMPARISON: CT head 06/11/2023 and MRI brain 04/15/2022.     TECHNIQUE: A MRI examination of the brain was performed utilizing  sagittal T1, axial diffusion, T1, T2, T2 FLAIR and gradient echo T2  imaging.     FINDINGS: The study is hampered by patient motion. There is no evidence  of restricted diffusion to suggest acute infarction. There is expected  flow-void in the basilar artery and in the distal aspect of internal  carotid arteries bilaterally on the axial T2 sequence. Increased signal  intensity is appreciated involving the white matter cerebral hemispheres  bilaterally consistent with moderate to severe small vessel ischemic  disease, unchanged.     There is moderate enlargement of the lateral and third ventricles,  unchanged. There is no evidence of mass or mass effect on this  noncontrasted MRI examination of the brain.       Impression:      There is no evidence of acute infarction. Moderate to severe  small vessel ischemic disease is noted, unchanged. Again appreciated is  moderate enlargement of the lateral and third ventricles, somewhat out  of  proportion for the degree of volume loss. The ventriculomegaly is  unchanged as compared to the prior examination. This may be secondary to  the presence of a Britton's pouch cyst as there is CSF prominence inferior  to the cerebellar hemispheres or normal pressure hydrocephalus. Clinical  correlation is suggested.       CT Head Without Contrast [[id number]] Collected: 06/11/23 2254     Updated: 06/11/23 2301    Narrative:      CT OF THE HEAD WITHOUT CONTRAST     HISTORY: Bilateral leg weakness     COMPARISON: 04/11/2022     TECHNIQUE: Axial CT imaging was obtained through the brain. No IV  contrast was administered.     FINDINGS:  No acute intracranial hemorrhage is seen. The patient is noted to have  atrophy, as well as marked dilatation of the ventricular system, out of  proportion to the degree of atrophy, concerning for hydrocephalus.  Similar findings were present on prior study. There is no midline shift.  Old lacunar infarcts are noted within the bilateral basal ganglia and  right thalamus. This is similar to the prior study. Mucous retention  cyst is noted within the left ethmoid sinus. Mastoid air cells appear  clear.       Impression:      No acute intracranial findings. Stable chronic hydrocephalus.     Radiation dose reduction techniques were utilized, including automated  exposure control and exposure modulation based on body size.     This report was finalized on 6/11/2023 10:58 PM by Dr. Nica Lee M.D.       XR Chest 1 View [[id number]] Collected: 06/11/23 2204     Updated: 06/11/23 2208    Narrative:      SINGLE VIEW OF THE CHEST     HISTORY: Extremity weakness     COMPARISON: None available.     FINDINGS:  There is cardiomegaly. There is no vascular congestion. No pneumothorax  or pleural effusion is seen. No acute infiltrates are identified. There  is some mild scarring versus atelectasis noted at the left lung base.       Impression:      Mild left basilar scarring versus atelectasis.      This report was finalized on 6/11/2023 10:05 PM by Dr. Nica Lee M.D.               I have reviewed the medications.  ---------------------------------------------------------------------------------------------  Assessment & Plan   Assessment/Problem List    TIA (transient ischemic attack)      Plan:    TIA  -CT head without negative for acute infarct  -Neurology consult, clear for discharge home  -MRI brain without no acute findings  -Lipid panel unremarkable, A1c 7.40     COSME-resolved  -Creatinine 1.19 today   DM2  -Resume home medications     Parkinson's disease  -Continue carbidopa levodopa     Hyperlipidemia  -Continue Lipitor     Hypothyroidism  -Levothyroxine     Hypertension  -Continue losartan     CAD  -Continue Plavix, Lipitor, isosorbide and metoprolol    Disposition: Home    Follow-up after Discharge: PCP & neurology    This note will serve as a discharge summary.    Snehal Roberto, APRN 06/12/23 10:19 EDT    I have worn appropriate PPE during this patient encounter, sanitized my hands both with entering and exiting patient's room.      40 minutes has been spent by [facility] providers in the care of this patient while under observation status

## 2023-06-12 NOTE — ED PROVIDER NOTES
EMERGENCY DEPARTMENT ENCOUNTER    Room Number:  07/07  Date of encounter:  6/12/2023  PCP: Jeronimo Quevedo MD  Patient Care Team:  Jeronimo Quevedo MD as PCP - General (Internal Medicine)  Moisés Kong MD as Consulting Physician (Cardiology)  Hay Love MD as Consulting Physician (Urology)  Rich Benoit MD as Consulting Physician (Ophthalmology)  RAULITO Acevedo MD as Consulting Physician (Ophthalmology)  Epifanio Silver MD as Consulting Physician (Endocrinology)  Fan Benoit MD as Surgeon (General Surgery)  Vikash Crocker DPM as Consulting Physician (Podiatry)  Will Ortega MD as Consulting Physician (Cardiology)   Independent Historians: Patient and spouse    HPI:  Chief Complaint: Weakness  A complete HPI/ROS/PMH/PSH/SH/FH are unobtainable due to: N/A    Chronic or social conditions impacting patient care (social determinants of health): None    Context: Griffin Angeles is a 82 y.o. male with past medical history of Parkinson's disease, dementia, T2DM, CAD, HTN, HLD, prior stroke and hypothyroidism who arrives to the ED for evaluation of possible stroke.  Patient's wife provides most of the history as he has dementia and denies any complaints, and she states that he is wheelchair-bound and seemed to be off balance and leaning over while sitting up in his chair, seemed generally weak, and off balance and the symptoms she first noticed at around 4:30 in the afternoon.  She denies any facial asymmetry, dysarthria, or apparent weakness of either arm or leg.  Patient denies any headache, chest pain, shortness of breath or any other complaints.  States that he has had 10 strokes in the past and is concerned about another possible stroke.    Review of prior external notes (non-ED): Most recent office visit with cardiology, Dr. Kong, on 5/18/2023 for follow-up on CAD, HTN, and HLD.    Review of prior external test results outside of this encounter: Most  recent echo was on 11/10/2022 and that showed a an LVEF of 61 to 65% and normal diastolic function.  Most recent brain imaging was an MRI on 4/12/2022 that shows a subcentimeter focus of restricted diffusion within the right corona radiata that is compatible with an acute infarct consistent with an acute lacunar infarct.  Patient has a relatively enlarged ventricular system with moderate degree of chronic hydrocephalus that is stable from prior study, and mild to moderate changes of chronic small vessel ischemic phenomena and old lacunar disease seen.    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 11/14/2016    Type 1 diabetes mellitus with proteinuria 05/15/2017    Chronic nonspecific colitis 09/21/2017    Multiple lacunar infarcts 01/31/2018    Type 1 diabetes mellitus with complications 01/31/2018    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    Parkinson's disease 04/11/2022    Fall at home 04/12/2022    Vascular dementia     History of CVA (cerebrovascular accident)     Cerebral ventriculomegaly      Resolved Ambulatory Problems     Diagnosis Date Noted    Celiac disease 08/09/2019    Nausea vomiting and diarrhea 04/12/2022     Past Medical History:   Diagnosis Date    BPH (benign prostatic hypertrophy)     CAD (coronary artery disease)     Dementia     Fall 05/2022    Hydrocephalus     Hyperparathyroidism     Hypertension     Hypothyroidism      Lacunar infarction     Parkinsonism     Shingles 2018    Stroke     Type 2 diabetes mellitus        The patient has started, but not completed, their COVID-19 vaccination series.    PAST SURGICAL HISTORY  Past Surgical History:   Procedure Laterality Date    CATARACT EXTRACTION Bilateral 2017    COLONOSCOPY  2017    Chronic inflammation.  Dr. Benoit.  [facility]    CORONARY ANGIOPLASTY WITH STENT PLACEMENT      HEMORRHOIDECTOMY      PARATHYROIDECTOMY      PENILE PROSTHESIS IMPLANT      Dr. [facility] at [facility]    RETINAL LASER PROCEDURE Right     TURP / TRANSURETHRAL INCISION / DRAINAGE PROSTATE           FAMILY HISTORY  Family History   Problem Relation Age of Onset    Diabetes Mother     Thyroid disease Mother     Stroke Mother     Diabetes Father     Kidney failure Father     Heart disease Sister          SOCIAL HISTORY  Social History     Socioeconomic History    Marital status:      Spouse name: Zohra    Number of children: 2    Years of education: 14    Highest education level: High school graduate   Tobacco Use    Smoking status: Former     Types: Pipe     Quit date:      Years since quittin.4     Passive exposure: Never    Smokeless tobacco: Never    Tobacco comments:     SMOKED PIPE occasionally   Vaping Use    Vaping Use: Never used   Substance and Sexual Activity    Alcohol use: Not Currently     Comment: d/c    Drug use: No    Sexual activity: Defer         ALLERGIES  Patient has no known allergies.        REVIEW OF SYSTEMS  Review of Systems     All systems reviewed and negative except for those discussed in HPI.       PHYSICAL EXAM    I have reviewed the triage vital signs and nursing notes.    ED Triage Vitals [23]   Temp Heart Rate Resp BP SpO2   98.1 °F (36.7 °C) 75 16 169/76 96 %      Temp src Heart Rate Source Patient Position BP Location FiO2 (%)   -- -- -- -- --       Physical Exam    GENERAL: alert, not distressed  HENT: normocephalic,  atraumatic, moist mucous membranes  EYES: no scleral icterus, PERRL, EOMI  CV: regular rhythm, regular rate, no murmurs, rubs, or gallops  RESPIRATORY: normal effort, CTAB  ABDOMEN: soft/nontender, no rebound or guarding  MUSCULOSKELETAL: no deformity, 1+ pitting edema BLE  NEURO: alert, moves all extremities, no facial asymmetry,  equal bilaterally, normal strength of upper and lower extremities, normal finger-to-nose, no pronator drift, follows commands, NIHSS 1 for not knowing the year  SKIN: warm, dry, no rash   Psych: Appropriate mood and affect      Nursing notes and vital signs reviewed      LAB RESULTS  Recent Results (from the past 24 hour(s))   ECG 12 Lead Stroke Evaluation    Collection Time: 06/11/23  9:16 PM   Result Value Ref Range    QT Interval 369 ms   Comprehensive Metabolic Panel    Collection Time: 06/11/23  9:27 PM    Specimen: Blood   Result Value Ref Range    Glucose 177 (H) 65 - 99 mg/dL    BUN 29 (H) 8 - 23 mg/dL    Creatinine 1.47 (H) 0.76 - 1.27 mg/dL    Sodium 142 136 - 145 mmol/L    Potassium 5.5 (H) 3.5 - 5.2 mmol/L    Chloride 107 98 - 107 mmol/L    CO2 29.0 22.0 - 29.0 mmol/L    Calcium 8.6 8.6 - 10.5 mg/dL    Total Protein 6.2 6.0 - 8.5 g/dL    Albumin 3.3 (L) 3.5 - 5.2 g/dL    ALT (SGPT) 14 1 - 41 U/L    AST (SGOT) 14 1 - 40 U/L    Alkaline Phosphatase 366 (H) 39 - 117 U/L    Total Bilirubin 0.2 0.0 - 1.2 mg/dL    Globulin 2.9 gm/dL    A/G Ratio 1.1 g/dL    BUN/Creatinine Ratio 19.7 7.0 - 25.0    Anion Gap 6.0 5.0 - 15.0 mmol/L    eGFR 47.6 (L) >60.0 mL/min/1.73   Protime-INR    Collection Time: 06/11/23  9:27 PM    Specimen: Blood   Result Value Ref Range    Protime 13.3 11.7 - 14.2 Seconds    INR 1.00 0.90 - 1.10   High Sensitivity Troponin T    Collection Time: 06/11/23  9:27 PM    Specimen: Blood   Result Value Ref Range    HS Troponin T 21 (H) <15 ng/L   Magnesium    Collection Time: 06/11/23  9:27 PM    Specimen: Blood   Result Value Ref Range    Magnesium 1.8 1.6 - 2.4  mg/dL   TSH    Collection Time: 06/11/23  9:27 PM    Specimen: Blood   Result Value Ref Range    TSH 1.560 0.270 - 4.200 uIU/mL   CBC Auto Differential    Collection Time: 06/11/23  9:27 PM    Specimen: Blood   Result Value Ref Range    WBC 8.81 3.40 - 10.80 10*3/mm3    RBC 4.20 4.14 - 5.80 10*6/mm3    Hemoglobin 12.8 (L) 13.0 - 17.7 g/dL    Hematocrit 38.2 37.5 - 51.0 %    MCV 91.0 79.0 - 97.0 fL    MCH 30.5 26.6 - 33.0 pg    MCHC 33.5 31.5 - 35.7 g/dL    RDW 12.9 12.3 - 15.4 %    RDW-SD 43.6 37.0 - 54.0 fl    MPV 12.7 (H) 6.0 - 12.0 fL    Platelets 181 140 - 450 10*3/mm3    Neutrophil % 74.0 42.7 - 76.0 %    Lymphocyte % 15.2 (L) 19.6 - 45.3 %    Monocyte % 7.8 5.0 - 12.0 %    Eosinophil % 2.5 0.3 - 6.2 %    Basophil % 0.3 0.0 - 1.5 %    Immature Grans % 0.2 0.0 - 0.5 %    Neutrophils, Absolute 6.51 1.70 - 7.00 10*3/mm3    Lymphocytes, Absolute 1.34 0.70 - 3.10 10*3/mm3    Monocytes, Absolute 0.69 0.10 - 0.90 10*3/mm3    Eosinophils, Absolute 0.22 0.00 - 0.40 10*3/mm3    Basophils, Absolute 0.03 0.00 - 0.20 10*3/mm3    Immature Grans, Absolute 0.02 0.00 - 0.05 10*3/mm3    nRBC 0.0 0.0 - 0.2 /100 WBC   Urinalysis With Microscopic If Indicated (No Culture) - Urine, Clean Catch    Collection Time: 06/11/23 10:38 PM    Specimen: Urine, Clean Catch   Result Value Ref Range    Color, UA Yellow Yellow, Straw    Appearance, UA Clear Clear    pH, UA 6.0 5.0 - 8.0    Specific Gravity, UA 1.011 1.005 - 1.030    Glucose, UA Negative Negative    Ketones, UA Negative Negative    Bilirubin, UA Negative Negative    Blood, UA Small (1+) (A) Negative    Protein,  mg/dL (2+) (A) Negative    Leuk Esterase, UA Small (1+) (A) Negative    Nitrite, UA Negative Negative    Urobilinogen, UA 0.2 E.U./dL 0.2 - 1.0 E.U./dL   Urinalysis, Microscopic Only - Urine, Clean Catch    Collection Time: 06/11/23 10:38 PM    Specimen: Urine, Clean Catch   Result Value Ref Range    RBC, UA 6-12 (A) None Seen, 0-2 /HPF    WBC, UA 21-30 (A) None  Seen, 0-2 /HPF    Bacteria, UA None Seen None Seen /HPF    Squamous Epithelial Cells, UA 0-2 None Seen, 0-2 /HPF    Hyaline Casts, UA None Seen None Seen /LPF    Methodology Automated Microscopy    High Sensitivity Troponin T 2Hr    Collection Time: 06/12/23 12:02 AM    Specimen: Blood   Result Value Ref Range    HS Troponin T 22 (H) <15 ng/L    Troponin T Delta 1 >=-4 - <+4 ng/L       Ordered the above labs and independently reviewed and interpreted the results by me.        RADIOLOGY  CT Head Without Contrast    Result Date: 6/11/2023  CT OF THE HEAD WITHOUT CONTRAST  HISTORY: Bilateral leg weakness  COMPARISON: 04/11/2022  TECHNIQUE: Axial CT imaging was obtained through the brain. No IV contrast was administered.  FINDINGS: No acute intracranial hemorrhage is seen. The patient is noted to have atrophy, as well as marked dilatation of the ventricular system, out of proportion to the degree of atrophy, concerning for hydrocephalus. Similar findings were present on prior study. There is no midline shift. Old lacunar infarcts are noted within the bilateral basal ganglia and right thalamus. This is similar to the prior study. Mucous retention cyst is noted within the left ethmoid sinus. Mastoid air cells appear clear.      No acute intracranial findings. Stable chronic hydrocephalus.  Radiation dose reduction techniques were utilized, including automated exposure control and exposure modulation based on body size.  This report was finalized on 6/11/2023 10:58 PM by Dr. Nica Lee M.D.      XR Chest 1 View    Result Date: 6/11/2023  SINGLE VIEW OF THE CHEST  HISTORY: Extremity weakness  COMPARISON: None available.  FINDINGS: There is cardiomegaly. There is no vascular congestion. No pneumothorax or pleural effusion is seen. No acute infiltrates are identified. There is some mild scarring versus atelectasis noted at the left lung base.      Mild left basilar scarring versus atelectasis.  This report was  finalized on 6/11/2023 10:05 PM by Dr. Nica Lee M.D.       I ordered the above noted radiological studies.  These were independently interpreted and reviewed by me.  See dictation for official radiology interpretation.      PROCEDURES    Procedures      MEDICATIONS GIVEN IN ER    Medications   sodium chloride 0.9 % flush 10 mL (has no administration in time range)   nitroglycerin (NITROSTAT) SL tablet 0.4 mg (has no administration in time range)   atorvastatin (LIPITOR) tablet 40 mg (has no administration in time range)   ondansetron (ZOFRAN) injection 4 mg (has no administration in time range)   aspirin tablet 325 mg (has no administration in time range)     Or   aspirin suppository 300 mg (has no administration in time range)   sodium chloride 0.9 % bolus 500 mL (0 mL Intravenous Stopped 6/11/23 2156)         PROGRESS, DATA ANALYSIS, CONSULTS, AND MEDICAL DECISION MAKING    All labs have been independently reviewed by me.  All radiology studies have been reviewed by me and discussed with radiologist dictating the report.   EKG's independently viewed and interpreted by me.  Discussion below represents my analysis of pertinent findings related to patient's condition, differential diagnosis, treatment plan and final disposition.    DDx:  Includes, but is not limited to CVA, TIA, Parkinson's, dementia, electrolyte abnormality, UTI    After my initial assessment I am concerned about CVA and patient may need hospitalization due to neurology consultation.    ED Course as of 06/12/23 0120   Sun Jun 11, 2023 2230 EKG          EKG time: 2116  Rhythm/Rate: Normal sinus rhythm at 80 bpm  P waves and MD: Normal  QRS, axis: Normal  ST and T waves: No acute ST/T wave changes    Interpreted Contemporaneously by me, independently viewed  Unchanged compared to prior EKG of 11/10/2022.   [ELLIOT]   2235 WBC: 8.81 [ELLIOT]   2235 Hemoglobin(!): 12.8 [ELLIOT]   2235 Hematocrit: 38.2 [ELLIOT]   2235 Platelets: 181 [ELLIOT]   2235 Glucose(!):  177 [ELLIOT]   2235 BUN(!): 29 [ELLIOT]   2235 Creatinine(!): 1.47 [ELLIOT]   2235 Sodium: 142 [ELLIOT]   2235 Potassium(!): 5.5 [ELLIOT]   2235 Magnesium: 1.8 [ELLIOT]   2235 Chloride: 107 [ELLIOT]   2235 CO2: 29.0 [ELLIOT]   2236 TSH Baseline: 1.560 [ELLIOT]   2236 HS Troponin T(!): 21 [ELLIOT]   2237 Chest x-ray images independently viewed by me, my interpretation is no active disease. [ELLIOT]   2239 CT head images independently viewed by me, my interpretation is that the patient has enlarged ventricles. [ELLIOT]   2312 Leukocytes, UA(!): Small (1+) [ELLIOT]   2312 Nitrite, UA: Negative [ELLIOT]   2312 RBC, UA(!): 6-12 [ELLIOT]   2312 WBC, UA(!): 21-30 [ELLIOT]   2312 Bacteria, UA: None Seen [ELLIOT]   Mon Jun 12, 2023   0031 HS Troponin T(!): 22 [ELLIOT]   0115 Patient history, ER presentation and evaluation discussed with Adrián DIAZ, will place in observation to Dr. Rausch. [ELLIOT]      ED Course User Index  [ELLIOT] Griffin Sullivan PA       MDM: We will place patient in the ops unit for further evaluation with MRI and neurology consultation.    PPE:  The patient wore a mask and I wore a mask and all appropriate PPE throughout the entire patient encounter.      AS OF 01:20 EDT VITALS:    BP - 149/81  HR - 83  TEMP - 98.1 °F (36.7 °C)  O2 SATS - 98%      DIAGNOSIS  Final diagnoses:   Weakness   Dementia due to Parkinson's disease, without behavioral disturbance, psychotic disturbance, mood disturbance, or anxiety, unspecified dementia severity   Acute renal failure superimposed on chronic kidney disease, unspecified CKD stage, unspecified acute renal failure type   Hypertension, unspecified type   Hyperlipidemia, unspecified hyperlipidemia type   Type 2 diabetes mellitus without complication, unspecified whether long term insulin use         DISPOSITION  ADMISSION    Discussed treatment plan and reason for admission with pt/family and admitting physician.  Pt/family voiced understanding of the plan for admission for further testing/treatment as needed.       Note Disclaimer: At  [facility], we believe that sharing information builds trust and better relationships. You are receiving this note because you recently visited [facility]. It is possible you will see health information before a provider has talked with you about it. This kind of information can be easy to misunderstand. To help you fully understand what it means for your health, we urge you to discuss this note with your provider.       Griffin Sullivan, PA  06/12/23 0118       Griffin Sullivan PA  06/12/23 0120

## 2023-06-12 NOTE — PLAN OF CARE
Goal Outcome Evaluation:              Outcome Evaluation: patient left observatio unit at this time via private vehicle with all his belongings, discharge summary provided and education inlcluded, wife at bedside during discharge, VSS aware to tiffany stanley with primary care as indicated, patient and family had no further questions prior to discharge.

## 2023-06-12 NOTE — PLAN OF CARE
Goal Outcome Evaluation:  Plan of Care Reviewed With: patient, spouse           Outcome Evaluation: Clinical swallow evaluation completed. No overt s/s of aspiration with ice chips, thin liquid by spoon/cup/straw, puree, soft/chopped solids, or regular solids. Wet vocal quality following mixed consistency. Recommend regular/thin liquid diet with no mixed. Meds whole with puree, as tolerated. Sitting upright, slow rate, small bites/sips.

## 2023-06-12 NOTE — H&P
. [facility]   HISTORY AND PHYSICAL    Patient Name: Griffin Angeles  : 1940  MRN: [id number]  Primary Care Physician:  Jeronimo Quevedo MD  Date of admission: 2023    Subjective   Subjective     Chief Complaint: Weakness    HPI:    Griffni Angeles is a 82 y.o. male with past medical history including but limited to BPH, CAD, dementia, Parkinson disease, hypertension, hyperlipidemia, hypothyroidism, and DM2 presents to [facility] with strokelike symptoms.  This H&P was conducted with assistance of patient's wife as he has dementia.  Patient is wheelchair-bound and his wife noticed that he was leaning towards the left and when she transferred him to the couch he did fall onto the left.  Reports earlier throughout the day patient was noted to have increased weakness to the point where he was not able to stand up with some assist.  Report history of CVAs.  Denies any dysarthria or facial droop.  Patient denies lightheadedness, headache, visual disturbance, or numbness or tingling.  Denies chest pain, palpitation, or dyspnea.  Denies abdominal pain, nausea, vomiting.  Denies cough, fever, or chills.    Laboratory evaluation in the ED include high-sensitivity troponin 21, 22 with a delta of 1, potassium 4.1, creatinine 1.47, GFR 47.6, WBC 8.8, hemoglobin 12.8, and platelets 181.  UA: Blood small, leukocytes small, protein 100, RBC 6-12, and WBC 21-30  Chest x-ray shows mild left basilar scarring versus atelectasis  CT head without negative for acute infarct    Review of Systems   All systems were reviewed and negative except for: Mentioned above in HPI    Personal History     Past Medical History:   Diagnosis Date    BPH (benign prostatic hypertrophy)     CAD (coronary artery disease)     Dementia     Fall 2022    Hydrocephalus     Hyperlipidemia     Hyperparathyroidism     Hypertension     Hypothyroidism     Lacunar infarction     Bilateral basal ganglia    Parkinsonism      Postherpetic neuralgia     Shingles 06/2018    Stroke     Type 2 diabetes mellitus        Past Surgical History:   Procedure Laterality Date    CATARACT EXTRACTION Bilateral 08/12/2017    COLONOSCOPY  08/16/2017    Chronic inflammation.  Dr. Benoit.  [facility]    CORONARY ANGIOPLASTY WITH STENT PLACEMENT      HEMORRHOIDECTOMY      PARATHYROIDECTOMY      PENILE PROSTHESIS IMPLANT  2016    Dr[facility] at [facility]    RETINAL LASER PROCEDURE Right     TURP / TRANSURETHRAL INCISION / DRAINAGE PROSTATE         Family History: family history includes Diabetes in his father and mother; Heart disease in his sister; Kidney failure in his father; Stroke in his mother; Thyroid disease in his mother. Otherwise pertinent FHx was reviewed and not pertinent to current issue.    Social History:  reports that he quit smoking about 37 years ago. His smoking use included pipe. He has never been exposed to tobacco smoke. He has never used smokeless tobacco. He reports that he does not currently use alcohol. He reports that he does not use drugs.    Home Medications:  Accu-Chek Softclix Lancets, FreeStyle Dione 2 Sensor, Insulin Pen Needle, Insulin Syringe-Needle U-100, QUEtiapine, Vitamin D3, atorvastatin, carbidopa-levodopa, clopidogrel, donepezil, escitalopram, fluticasone, insulin aspart, insulin degludec, isosorbide mononitrate, levothyroxine, losartan, metoprolol succinate XL, and tamsulosin    Allergies:  No Known Allergies    Objective   Objective     Vitals:   Temp:  [97.8 °F (36.6 °C)-98.1 °F (36.7 °C)] 97.8 °F (36.6 °C)  Heart Rate:  [65-84] 84  Resp:  [16] 16  BP: (149-175)/(76-97) 159/92  Physical Exam    Constitutional: Awake, alert   Eyes: PERRLA, sclerae anicteric, no conjunctival injection   HENT: NCAT, mucous membranes moist   Neck: Supple, no thyromegaly, no lymphadenopathy, trachea midline   Respiratory: Clear to auscultation bilaterally, nonlabored respirations    Cardiovascular: RRR, no murmurs, rubs, or  gallops, palpable pedal pulses bilaterally   Gastrointestinal: Positive bowel sounds, soft, nontender, nondistended   Musculoskeletal: No bilateral ankle edema, no clubbing or cyanosis to extremities   Psychiatric: Appropriate affect, cooperative   Neurologic: Oriented to person and place, strength symmetric in all extremities, Cranial Nerves grossly intact to confrontation, speech clear   Skin: No rashes     Result Review    Result Review:  I have personally reviewed the results from the time of this admission to 6/12/2023 02:59 EDT and agree with these findings:  []  Laboratory list / accordion  []  Microbiology  []  Radiology  []  EKG/Telemetry   []  Cardiology/Vascular   []  Pathology  []  Old records  []  Other:        Assessment & Plan   Assessment / Plan     Brief Patient Summary:  Griffin Angeles is a 82 y.o. male who was seen and evaluated the ED for TIA-like symptoms.  Patient is being admitted to observation for further evaluation and neurology consult.    Active Hospital Problems:  Active Hospital Problems    Diagnosis     **TIA (transient ischemic attack)      Plan:   TIA  -CT head without negative for acute infarct  -Neurology consult  -MRI brain without pending  -Neurochecks every 4 hours  -Aspirin and statin  -Lipid panel and A1c    COSME resolved  -Creatinine 1.19  -IVF  -Avoid nephrotoxins      DM2  -Accu-Chek before meals and at bedtime  -Insulin sliding scale    Parkinson's disease  -Continue carbidopa levodopa    Hyperlipidemia  -Continue Lipitor    Hypothyroidism  -Levothyroxine    Hypertension  -Continue losartan  -Vital signs per nursing    CAD  -Continue Plavix, Lipitor, isosorbide and metoprolol    DVT prophylaxis:  Mechanical DVT prophylaxis orders are present.    CODE STATUS:    Level Of Support Discussed With: Health Care Surrogate  Code Status (Patient has no pulse and is not breathing): No CPR (Do Not Attempt to Resuscitate)  Medical Interventions (Patient has pulse or is breathing):  Comfort Measures  Release to patient: Routine Release    Admission Status:  I believe this patient meets observation status.    .Total of 79 minutes have been spent on this H&P including face-to-face encounter and reviewing labs and imaging    Electronically signed by JOE Gordon, 06/12/23, 2:59 AM EDT.

## 2023-06-12 NOTE — ED NOTES
Pt given urinal and aware that urine sample needed. Pt reports he will tell nurse and/or tech when he urinates

## 2023-06-13 NOTE — OUTREACH NOTE
Prep Survey      Flowsheet Row Responses   [facility] facility patient discharged from? [place]   Is LACE score < 7 ? No   Eligibility Readm Mgmt   Discharge diagnosis TIA   Does the patient have one of the following disease processes/diagnoses(primary or secondary)? Stroke   Does the patient have Home health ordered? No   Is there a DME ordered? No   Prep survey completed? Yes            La GREENE - Registered Nurse

## 2023-06-14 LAB — BACTERIA SPEC AEROBE CULT: ABNORMAL

## 2023-06-15 ENCOUNTER — READMISSION MANAGEMENT (OUTPATIENT)
Dept: CALL CENTER | Facility: HOSPITAL | Age: 83
End: 2023-06-15
Payer: MEDICARE

## 2023-06-15 NOTE — OUTREACH NOTE
Stroke Week 1 Survey      Flowsheet Row Responses   [facility] patient discharged from? [place]   Does the patient have one of the following disease processes/diagnoses(primary or secondary)? Stroke   Week 1 attempt successful? Yes   Call start time 1533   Call end time 1541   Discharge diagnosis TIA   Person spoke with today (if not patient) and relationship spouse   Meds reviewed with patient/caregiver? Yes   Is the patient having any side effects they believe may be caused by any medication additions or changes? No   Does the patient have all medications ordered at discharge? Yes   Is the patient taking all medications as directed (includes completed medication regime)? Yes   Comments regarding appointments Pt will f/u with neurology in July   Does the patient have a primary care provider?  Yes   Does the patient have an appointment with their PCP within 7 days of discharge? No   Comments regarding PCP Sae   What is preventing the patient from scheduling follow up appointments within 7 days of discharge? Haven't had time, Unsure of when or with whom   Nursing Interventions Advised patient to make appointment   Has the patient kept scheduled appointments due by today? Yes   Psychosocial issues? No   Does the patient require any assistance with activities of daily living such as eating, bathing, dressing, walking, etc.? No   Does the patient have any residual symptoms from stroke/TIA? No   Did the patient receive a copy of their discharge instructions? Yes   Nursing interventions Reviewed instructions with patient   What is the patient's perception of their health status since discharge? Improving   Is the patient/caregiver able to teach back signs and symptoms related to disease process for when to call 911? Yes   If the patient is a current smoker, are they able to teach back resources for cessation? Not a smoker   Is the patient able to teach back FAST for Stroke? B alance: Watch for sudden loss of  balance, E yes: Check for vision loss, F ace: Look for an uneven smile, A rm: Check if one arm is weak, S peech: Listen for slurred speech, T herbert: Call 9-1-1 right away   Week 1 call completed? Yes   Is the patient interested in additional calls from an ambulatory ?  NOTE:  applies to high risk patients requiring additional follow-up. No   Wrap up additional comments Spouse reports patient is resting at time of call.  We discussed s/sx of TIA/stroke for future recognition and when to seek help. Spouse verbalized understanding.  No needs at this time.            MELBA ADORNO - Registered Nurse

## 2023-08-08 RX ORDER — PEN NEEDLE, DIABETIC 32GX 5/32"
1 NEEDLE, DISPOSABLE MISCELLANEOUS 4 TIMES DAILY
Qty: 360 EACH | Refills: 1 | Status: SHIPPED | OUTPATIENT
Start: 2023-08-08

## 2023-08-09 ENCOUNTER — OFFICE VISIT (OUTPATIENT)
Dept: ENDOCRINOLOGY | Age: 83
End: 2023-08-09
Payer: MEDICARE

## 2023-08-09 VITALS
HEIGHT: 67 IN | DIASTOLIC BLOOD PRESSURE: 80 MMHG | SYSTOLIC BLOOD PRESSURE: 130 MMHG | BODY MASS INDEX: 21.93 KG/M2 | HEART RATE: 65 BPM | TEMPERATURE: 96.8 F | OXYGEN SATURATION: 97 %

## 2023-08-09 DIAGNOSIS — I63.81 MULTIPLE LACUNAR INFARCTS: ICD-10-CM

## 2023-08-09 DIAGNOSIS — I10 ESSENTIAL HYPERTENSION: ICD-10-CM

## 2023-08-09 DIAGNOSIS — E10.29 TYPE 1 DIABETES MELLITUS WITH PROTEINURIA: Primary | ICD-10-CM

## 2023-08-09 DIAGNOSIS — R80.9 TYPE 1 DIABETES MELLITUS WITH PROTEINURIA: Primary | ICD-10-CM

## 2023-08-09 DIAGNOSIS — E55.9 VITAMIN D DEFICIENCY: ICD-10-CM

## 2023-08-09 DIAGNOSIS — E89.0 POSTOPERATIVE HYPOTHYROIDISM: ICD-10-CM

## 2023-08-09 DIAGNOSIS — I25.10 CORONARY ARTERY DISEASE INVOLVING NATIVE CORONARY ARTERY OF NATIVE HEART WITHOUT ANGINA PECTORIS: ICD-10-CM

## 2023-08-09 DIAGNOSIS — G20 PARKINSON'S DISEASE: ICD-10-CM

## 2023-08-09 PROCEDURE — 99214 OFFICE O/P EST MOD 30 MIN: CPT | Performed by: INTERNAL MEDICINE

## 2023-08-09 PROCEDURE — 3075F SYST BP GE 130 - 139MM HG: CPT | Performed by: INTERNAL MEDICINE

## 2023-08-09 PROCEDURE — 3079F DIAST BP 80-89 MM HG: CPT | Performed by: INTERNAL MEDICINE

## 2023-08-09 RX ORDER — KETOCONAZOLE 20 MG/ML
SHAMPOO TOPICAL
COMMUNITY
Start: 2023-05-23

## 2023-08-09 RX ORDER — INSULIN ASPART 100 [IU]/ML
INJECTION, SOLUTION INTRAVENOUS; SUBCUTANEOUS
Qty: 30 ML | Refills: 3 | Status: SHIPPED | OUTPATIENT
Start: 2023-08-09

## 2023-08-09 RX ORDER — INSULIN DEGLUDEC INJECTION 100 U/ML
INJECTION, SOLUTION SUBCUTANEOUS
Qty: 15 ML | Refills: 2 | Status: SHIPPED | OUTPATIENT
Start: 2023-08-09

## 2023-08-09 RX ORDER — INSULIN DEGLUDEC INJECTION 100 U/ML
INJECTION, SOLUTION SUBCUTANEOUS
Qty: 15 ML | Refills: 2 | Status: SHIPPED | OUTPATIENT
Start: 2023-08-09 | End: 2023-08-09 | Stop reason: SDUPTHER

## 2023-08-09 NOTE — PROGRESS NOTES
Subjective   Griffin Angeles is a 82 y.o. male.     History of Present Illness       Patient is a 82-year-old male who came in for diabetes control. He has known diabetes mellitus for 30 years. He has been on Tresiba 8 q AM and Novolog 6-10 units with each meal.  He has freestyle nitza sensor 2.  He has occasional hypoglycemia in early AM.  He has lost 7 lbs since  2/23  His last meal was at 9 AM.    His last eye examination was in 12/21. 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  He had an embolic plaque on left retinal artery.  He denies numbness, tingling or burning on his hands or feet. He has albuminuria on urine sample taken in 8/22. He is on losartan.       He had previous thyroidectomy for unknown thyroid disease. He has been on levothyroxine 88 mcg per day.      He has known coronary disease and had previous angioplasty with stent and history of hypertension. He had a normal stress test in done in 9/18 by Dr. Kong. He is on Plavix, Toprol, losartan, and Imdur.  He denies chest pain or SOB.      He has hyperlipidemia and has been on Lipitor 20 mg once a day. He denies any myalgia.       He was seen for gait abnormality last September 2017 by Dr. Mcdonald.  MRI showed bilateral small lacunar infarction of the basal ganglia and chronic appearing hydrocephalus.  He was seen by neurologist at the [facility] and was told to have parkinsonism and dementia.  Namenda made him too sleepy and it was discontinued.  Entacapone made him anxious.  He is on Sinemet.  Carotid ultrasound done in April 2019 showed bilateral atherosclerotic plaque but no significant stenosis.     He was admitted for an acute right corona radiator infarct in April 2022.  He had left hemiparesis and spent some time in rehab facility.     He can not walk.  He will not use thickened liquids.He is eating regular food despite known aspiration risk.      He has erectile dysfunction and urinary  "incontinence and has seen Dr. Love. He had penile implant in 8/16.  He has urinary incontinence and is using diapers.  He has nocturia 2-3 times a night.  He is on Flomax prn for urinary retention.       He was seen by Dr. Fan Benoit in April 2019 for diarrhea.  He had an EGD which showed preserved villous structure and Brunner's gland hyperplasia.  Diarrhea did not improve on gluten-free diet.  He had a colonoscopy in May 2019 and had transverse colon polyp with focal adenomatous change without high-grade dysplasia was removed. He denies melena or hematochezia.         The following portions of the patient's history were reviewed and updated as appropriate: allergies, current medications, past family history, past medical history, past social history, past surgical history, and problem list.    Review of Systems  Vitals:    08/09/23 1338   BP: 130/80   Pulse: 65   Temp: 96.8 øF (36 øC)   TempSrc: Temporal   SpO2: 97%   Height: 170.2 cm (67\")      Objective   Physical Exam  Constitutional:       Appearance: Normal appearance.   Eyes:      General: No scleral icterus.        Right eye: No discharge.         Left eye: No discharge.      Extraocular Movements: Extraocular movements intact.      Conjunctiva/sclera: Conjunctivae normal.   Neck:      Vascular: No carotid bruit.   Cardiovascular:      Rate and Rhythm: Normal rate and regular rhythm.      Heart sounds: Normal heart sounds.   Pulmonary:      Breath sounds: Normal breath sounds. No rales.   Chest:      Chest wall: No tenderness.   Abdominal:      Palpations: Abdomen is soft.      Tenderness: There is no right CVA tenderness or left CVA tenderness.   Lymphadenopathy:      Cervical: No cervical adenopathy.   Neurological:      Mental Status: He is alert.     Admission on 06/11/2023, Discharged on 06/12/2023   Component Date Value Ref Range Status    Glucose 06/11/2023 177 (H)  65 - 99 mg/dL Final    BUN 06/11/2023 29 (H)  8 - 23 mg/dL Final    Creatinine " 06/11/2023 1.47 (H)  0.76 - 1.27 mg/dL Final    Sodium 06/11/2023 142  136 - 145 mmol/L Final    Potassium 06/11/2023 5.5 (H)  3.5 - 5.2 mmol/L Final    Slight hemolysis detected by analyzer. Results may be affected.    Chloride 06/11/2023 107  98 - 107 mmol/L Final    CO2 06/11/2023 29.0  22.0 - 29.0 mmol/L Final    Calcium 06/11/2023 8.6  8.6 - 10.5 mg/dL Final    Total Protein 06/11/2023 6.2  6.0 - 8.5 g/dL Final    Albumin 06/11/2023 3.3 (L)  3.5 - 5.2 g/dL Final    ALT (SGPT) 06/11/2023 14  1 - 41 U/L Final    AST (SGOT) 06/11/2023 14  1 - 40 U/L Final    Slight hemolysis detected by analyzer. Results may be affected.    Alkaline Phosphatase 06/11/2023 366 (H)  39 - 117 U/L Final    Total Bilirubin 06/11/2023 0.2  0.0 - 1.2 mg/dL Final    Globulin 06/11/2023 2.9  gm/dL Final    A/G Ratio 06/11/2023 1.1  g/dL Final    BUN/Creatinine Ratio 06/11/2023 19.7  7.0 - 25.0 Final    Anion Gap 06/11/2023 6.0  5.0 - 15.0 mmol/L Final    eGFR 06/11/2023 47.6 (L)  >60.0 mL/min/1.73 Final    Protime 06/11/2023 13.3  11.7 - 14.2 Seconds Final    INR 06/11/2023 1.00  0.90 - 1.10 Final    Color, UA 06/11/2023 Yellow  Yellow, Straw Final    Appearance, UA 06/11/2023 Clear  Clear Final    pH, UA 06/11/2023 6.0  5.0 - 8.0 Final    Specific Gravity, UA 06/11/2023 1.011  1.005 - 1.030 Final    Glucose, UA 06/11/2023 Negative  Negative Final    Ketones, UA 06/11/2023 Negative  Negative Final    Bilirubin, UA 06/11/2023 Negative  Negative Final    Blood, UA 06/11/2023 Small (1+) (A)  Negative Final    Protein, UA 06/11/2023 100 mg/dL (2+) (A)  Negative Final    Leuk Esterase, UA 06/11/2023 Small (1+) (A)  Negative Final    Nitrite, UA 06/11/2023 Negative  Negative Final    Urobilinogen, UA 06/11/2023 0.2 E.U./dL  0.2 - 1.0 E.U./dL Final    HS Troponin T 06/11/2023 21 (H)  <15 ng/L Final    Magnesium 06/11/2023 1.8  1.6 - 2.4 mg/dL Final    TSH 06/11/2023 1.560  0.270 - 4.200 uIU/mL Final    QT Interval 06/11/2023 369  ms Final    WBC  06/11/2023 8.81  3.40 - 10.80 10*3/mm3 Final    RBC 06/11/2023 4.20  4.14 - 5.80 10*6/mm3 Final    Hemoglobin 06/11/2023 12.8 (L)  13.0 - 17.7 g/dL Final    Hematocrit 06/11/2023 38.2  37.5 - 51.0 % Final    MCV 06/11/2023 91.0  79.0 - 97.0 fL Final    MCH 06/11/2023 30.5  26.6 - 33.0 pg Final    MCHC 06/11/2023 33.5  31.5 - 35.7 g/dL Final    RDW 06/11/2023 12.9  12.3 - 15.4 % Final    RDW-SD 06/11/2023 43.6  37.0 - 54.0 fl Final    MPV 06/11/2023 12.7 (H)  6.0 - 12.0 fL Final    Platelets 06/11/2023 181  140 - 450 10*3/mm3 Final    Neutrophil % 06/11/2023 74.0  42.7 - 76.0 % Final    Lymphocyte % 06/11/2023 15.2 (L)  19.6 - 45.3 % Final    Monocyte % 06/11/2023 7.8  5.0 - 12.0 % Final    Eosinophil % 06/11/2023 2.5  0.3 - 6.2 % Final    Basophil % 06/11/2023 0.3  0.0 - 1.5 % Final    Immature Grans % 06/11/2023 0.2  0.0 - 0.5 % Final    Neutrophils, Absolute 06/11/2023 6.51  1.70 - 7.00 10*3/mm3 Final    Lymphocytes, Absolute 06/11/2023 1.34  0.70 - 3.10 10*3/mm3 Final    Monocytes, Absolute 06/11/2023 0.69  0.10 - 0.90 10*3/mm3 Final    Eosinophils, Absolute 06/11/2023 0.22  0.00 - 0.40 10*3/mm3 Final    Basophils, Absolute 06/11/2023 0.03  0.00 - 0.20 10*3/mm3 Final    Immature Grans, Absolute 06/11/2023 0.02  0.00 - 0.05 10*3/mm3 Final    nRBC 06/11/2023 0.0  0.0 - 0.2 /100 WBC Final    HS Troponin T 06/12/2023 22 (H)  <15 ng/L Final    Troponin T Delta 06/12/2023 1  >=-4 - <+4 ng/L Final    RBC, UA 06/11/2023 6-12 (A)  None Seen, 0-2 /HPF Final    WBC, UA 06/11/2023 21-30 (A)  None Seen, 0-2 /HPF Final    Bacteria, UA 06/11/2023 None Seen  None Seen /HPF Final    Squamous Epithelial Cells, UA 06/11/2023 0-2  None Seen, 0-2 /HPF Final    Hyaline Casts, UA 06/11/2023 None Seen  None Seen /LPF Final    Methodology 06/11/2023 Automated Microscopy   Final    Hemoglobin A1C 06/12/2023 7.40 (H)  4.80 - 5.60 % Final    Total Cholesterol 06/12/2023 107  0 - 200 mg/dL Final    Triglycerides 06/12/2023 78  0 - 150 mg/dL  Final    HDL Cholesterol 06/12/2023 53  40 - 60 mg/dL Final    LDL Cholesterol  06/12/2023 38  0 - 100 mg/dL Final    VLDL Cholesterol 06/12/2023 16  5 - 40 mg/dL Final    LDL/HDL Ratio 06/12/2023 0.72   Final    Glucose 06/12/2023 127 (H)  65 - 99 mg/dL Final    BUN 06/12/2023 28 (H)  8 - 23 mg/dL Final    Creatinine 06/12/2023 1.19  0.76 - 1.27 mg/dL Final    Sodium 06/12/2023 144  136 - 145 mmol/L Final    Potassium 06/12/2023 4.1  3.5 - 5.2 mmol/L Final    Chloride 06/12/2023 110 (H)  98 - 107 mmol/L Final    CO2 06/12/2023 26.0  22.0 - 29.0 mmol/L Final    Calcium 06/12/2023 8.5 (L)  8.6 - 10.5 mg/dL Final    Total Protein 06/12/2023 6.0  6.0 - 8.5 g/dL Final    Albumin 06/12/2023 3.3 (L)  3.5 - 5.2 g/dL Final    ALT (SGPT) 06/12/2023 14  1 - 41 U/L Final    AST (SGOT) 06/12/2023 11  1 - 40 U/L Final    Alkaline Phosphatase 06/12/2023 355 (H)  39 - 117 U/L Final    Total Bilirubin 06/12/2023 0.3  0.0 - 1.2 mg/dL Final    Globulin 06/12/2023 2.7  gm/dL Final    A/G Ratio 06/12/2023 1.2  g/dL Final    BUN/Creatinine Ratio 06/12/2023 23.5  7.0 - 25.0 Final    Anion Gap 06/12/2023 8.0  5.0 - 15.0 mmol/L Final    eGFR 06/12/2023 61.4  >60.0 mL/min/1.73 Final    Urine Culture 06/11/2023 >100,000 CFU/mL Enterococcus faecalis (A)   Final    Glucose 06/12/2023 125  70 - 130 mg/dL Final    Meter: [id number] : [id number] Alana Guevara NA    Glucose 06/12/2023 153 (H)  70 - 130 mg/dL Final    Meter: [id number] : [id number] Alana TELLO     Assessment & Plan   Diagnoses and all orders for this visit:    1. Type 1 diabetes mellitus with proteinuria (Primary)  -     Comprehensive Metabolic Panel  -     Lipid Panel  -     Hemoglobin A1c  -     Discontinue: insulin degludec (Tresiba FlexTouch) 100 UNIT/ML solution pen-injector injection; 6 units every morning  Dispense: 15 mL; Refill: 2  -     insulin degludec (Tresiba FlexTouch) 100 UNIT/ML solution pen-injector injection; 6 units every morning  Dispense:  15 mL; Refill: 2  -     NovoLOG FlexPen 100 UNIT/ML solution pen-injector sc pen; 2-10 units TID with meal  Dispense: 30 mL; Refill: 3  -     Celiac Disease Panel    2. Postoperative hypothyroidism  -     TSH  -     T4, Free    3. Coronary artery disease involving native coronary artery of native heart without angina pectoris  -     Lipid Panel    4. Essential hypertension  -     Comprehensive Metabolic Panel    5. Parkinson's disease    6. Multiple lacunar infarcts    7. Vitamin D deficiency  -     Vitamin D,25-Hydroxy    Other orders  -     glucagon (GLUCAGEN) 1 MG injection; 1 mg IM as needed for low blood sugars.  Follow package directions for low blood sugar.  Dispense: 1 kit; Refill: 2      Decrease Tresiba to 6 units every morning.  Continue NovoLog 2 to 10 units with each meal.  Continue levothyroxine 88 mcg a day.  Continue Lipitor 20 mg/day.  Continue Plavix.    Copy of my note sent to Dr. Quevedo.    RTC 4 mos. With OSIEL Singleton NP and with me in 8 mos.

## 2023-08-10 ENCOUNTER — PRIOR AUTHORIZATION (OUTPATIENT)
Dept: ENDOCRINOLOGY | Age: 83
End: 2023-08-10
Payer: COMMERCIAL

## 2023-08-10 LAB
ALBUMIN SERPL-MCNC: 3.6 G/DL (ref 3.5–5.2)
ALBUMIN/GLOB SERPL: 1.4 G/DL
ALP SERPL-CCNC: 349 U/L (ref 39–117)
ALT SERPL-CCNC: 7 U/L (ref 1–41)
AST SERPL-CCNC: 8 U/L (ref 1–40)
BILIRUB SERPL-MCNC: 0.4 MG/DL (ref 0–1.2)
BUN SERPL-MCNC: 27 MG/DL (ref 8–23)
BUN/CREAT SERPL: 16.2 (ref 7–25)
CALCIUM SERPL-MCNC: 9.4 MG/DL (ref 8.6–10.5)
CHLORIDE SERPL-SCNC: 104 MMOL/L (ref 98–107)
CHOLEST SERPL-MCNC: 122 MG/DL (ref 0–200)
CO2 SERPL-SCNC: 25.9 MMOL/L (ref 22–29)
CREAT SERPL-MCNC: 1.67 MG/DL (ref 0.76–1.27)
EGFRCR SERPLBLD CKD-EPI 2021: 40.9 ML/MIN/1.73
GLOBULIN SER CALC-MCNC: 2.5 GM/DL
GLUCOSE SERPL-MCNC: 184 MG/DL (ref 65–99)
HBA1C MFR BLD: 7.3 % (ref 4.8–5.6)
HDLC SERPL-MCNC: 50 MG/DL (ref 40–60)
IMP & REVIEW OF LAB RESULTS: NORMAL
LDLC SERPL CALC-MCNC: 50 MG/DL (ref 0–100)
POTASSIUM SERPL-SCNC: 4.4 MMOL/L (ref 3.5–5.2)
PROT SERPL-MCNC: 6.1 G/DL (ref 6–8.5)
SODIUM SERPL-SCNC: 140 MMOL/L (ref 136–145)
T4 FREE SERPL-MCNC: 1.53 NG/DL (ref 0.93–1.7)
TRIGL SERPL-MCNC: 128 MG/DL (ref 0–150)
TSH SERPL DL<=0.005 MIU/L-ACNC: 0.68 UIU/ML (ref 0.27–4.2)
VLDLC SERPL CALC-MCNC: 22 MG/DL (ref 5–40)

## 2023-08-10 NOTE — TELEPHONE ENCOUNTER
8/10/2023    PA has been initiated in Covermymeds as of today for the Glucagon Emergency 1 MG Kit all information has been submitted and forward over to the patient insurance company for further review. It is currently still pending a response back from the patient insurance company for further determination.      Key: [id number] - PA Case ID: [id number] - Rx #: [id number]

## 2023-08-13 NOTE — PROGRESS NOTES
Hemoglobin A1c 7.3%.  Diabetes is in fair control.  LDL 50.  HDL 50.  Continue Lipitor 20 mg/day.  Normal thyroid function tests.  Continue levothyroxine 88 mcg a day.  Send copy of labs to Dr. Quevedo.  Copy of labs sent to patient through [facility].

## 2023-08-15 RX ORDER — ISOSORBIDE MONONITRATE 30 MG/1
TABLET, EXTENDED RELEASE ORAL
Qty: 90 TABLET | Refills: 1 | Status: SHIPPED | OUTPATIENT
Start: 2023-08-15

## 2023-11-02 ENCOUNTER — TELEPHONE (OUTPATIENT)
Dept: CARDIOLOGY | Facility: CLINIC | Age: 83
End: 2023-11-02

## 2023-11-02 NOTE — TELEPHONE ENCOUNTER
"    “Please be informed that patient has passed. Patient has been marked  in the system. The date of death is: 2023\".    Caller: REGINALDO    Relationship: SPOUSE    Best call back number: [phone]    "